# Patient Record
Sex: MALE | Race: WHITE | NOT HISPANIC OR LATINO | Employment: OTHER | URBAN - METROPOLITAN AREA
[De-identification: names, ages, dates, MRNs, and addresses within clinical notes are randomized per-mention and may not be internally consistent; named-entity substitution may affect disease eponyms.]

---

## 2017-01-19 ENCOUNTER — OFFICE VISIT (OUTPATIENT)
Dept: FAMILY MEDICINE CLINIC | Facility: CLINIC | Age: 47
End: 2017-01-19

## 2017-01-19 VITALS
OXYGEN SATURATION: 97 % | WEIGHT: 315 LBS | TEMPERATURE: 98.3 F | DIASTOLIC BLOOD PRESSURE: 70 MMHG | HEART RATE: 67 BPM | BODY MASS INDEX: 47.62 KG/M2 | SYSTOLIC BLOOD PRESSURE: 138 MMHG

## 2017-01-19 DIAGNOSIS — M1A.09X0 IDIOPATHIC CHRONIC GOUT OF MULTIPLE SITES WITHOUT TOPHUS: ICD-10-CM

## 2017-01-19 DIAGNOSIS — L23.5 ALLERGIC DERMATITIS DUE TO OTHER CHEMICAL PRODUCT: ICD-10-CM

## 2017-01-19 DIAGNOSIS — M54.9 CVA TENDERNESS: ICD-10-CM

## 2017-01-19 DIAGNOSIS — E11.42 TYPE 2 DIABETES MELLITUS WITH DIABETIC POLYNEUROPATHY, WITHOUT LONG-TERM CURRENT USE OF INSULIN (HCC): Primary | ICD-10-CM

## 2017-01-19 DIAGNOSIS — R79.89 ABNORMAL LIVER FUNCTION TESTS: ICD-10-CM

## 2017-01-19 DIAGNOSIS — Z79.899 DRUG THERAPY: ICD-10-CM

## 2017-01-19 DIAGNOSIS — I10 BENIGN ESSENTIAL HYPERTENSION: ICD-10-CM

## 2017-01-19 DIAGNOSIS — E78.1 HYPERTRIGLYCERIDEMIA: ICD-10-CM

## 2017-01-19 DIAGNOSIS — I89.0 LYMPHEDEMA: ICD-10-CM

## 2017-01-19 DIAGNOSIS — R53.83 OTHER FATIGUE: ICD-10-CM

## 2017-01-19 LAB
ALBUMIN SERPL-MCNC: 4.4 G/DL (ref 3.5–5.2)
ALBUMIN/GLOB SERPL: 1.6 G/DL
ALP SERPL-CCNC: 80 U/L (ref 39–117)
ALT SERPL-CCNC: 95 U/L (ref 1–41)
AST SERPL-CCNC: 89 U/L (ref 1–40)
BILIRUB BLD-MCNC: NEGATIVE MG/DL
BILIRUB SERPL-MCNC: 0.5 MG/DL (ref 0.1–1.2)
BUN SERPL-MCNC: 21 MG/DL (ref 6–20)
BUN/CREAT SERPL: 19.8 (ref 7–25)
CALCIUM SERPL-MCNC: 9.5 MG/DL (ref 8.6–10.5)
CHLORIDE SERPL-SCNC: 95 MMOL/L (ref 98–107)
CHOLEST SERPL-MCNC: 159 MG/DL (ref 0–200)
CLARITY, POC: CLEAR
CO2 SERPL-SCNC: 26.7 MMOL/L (ref 22–29)
COLOR UR: YELLOW
CREAT SERPL-MCNC: 1.06 MG/DL (ref 0.76–1.27)
GLOBULIN SER CALC-MCNC: 2.8 GM/DL
GLUCOSE SERPL-MCNC: 131 MG/DL (ref 65–99)
GLUCOSE UR STRIP-MCNC: ABNORMAL MG/DL
HBA1C MFR BLD: 6.75 % (ref 4.8–5.6)
HDLC SERPL-MCNC: 29 MG/DL (ref 40–60)
KETONES UR QL: NEGATIVE
LDLC SERPL CALC-MCNC: 68 MG/DL (ref 0–100)
LEUKOCYTE EST, POC: NEGATIVE
NITRITE UR-MCNC: NEGATIVE MG/ML
PH UR: 5.5 [PH] (ref 5–8)
POTASSIUM SERPL-SCNC: 4 MMOL/L (ref 3.5–5.2)
PROT SERPL-MCNC: 7.2 G/DL (ref 6–8.5)
PROT UR STRIP-MCNC: NEGATIVE MG/DL
RBC # UR STRIP: NEGATIVE /UL
SODIUM SERPL-SCNC: 136 MMOL/L (ref 136–145)
SP GR UR: 1.02 (ref 1–1.03)
TRIGL SERPL-MCNC: 312 MG/DL (ref 0–150)
URATE SERPL-MCNC: 5.8 MG/DL (ref 3.4–7)
UROBILINOGEN UR QL: NORMAL
VLDLC SERPL CALC-MCNC: 62.4 MG/DL (ref 5–40)

## 2017-01-19 PROCEDURE — 99214 OFFICE O/P EST MOD 30 MIN: CPT | Performed by: FAMILY MEDICINE

## 2017-01-19 PROCEDURE — 81003 URINALYSIS AUTO W/O SCOPE: CPT | Performed by: FAMILY MEDICINE

## 2017-01-19 RX ORDER — TRIAMCINOLONE ACETONIDE 5 MG/G
OINTMENT TOPICAL 2 TIMES DAILY
Qty: 60 G | Refills: 2 | Status: SHIPPED | OUTPATIENT
Start: 2017-01-19 | End: 2017-07-20

## 2017-01-19 RX ORDER — SPIRONOLACTONE 25 MG/1
25 TABLET ORAL DAILY
Qty: 30 TABLET | Refills: 11 | Status: SHIPPED | OUTPATIENT
Start: 2017-01-19 | End: 2020-05-13

## 2017-01-19 RX ORDER — POTASSIUM CHLORIDE 1500 MG/1
20 TABLET, FILM COATED, EXTENDED RELEASE ORAL 2 TIMES DAILY
Qty: 60 TABLET | Refills: 5 | Status: SHIPPED | OUTPATIENT
Start: 2017-01-19 | End: 2018-01-23

## 2017-01-19 NOTE — PROGRESS NOTES
Subjective   Marcial Desai is a 46 y.o. male. Presents today for   Chief Complaint   Patient presents with   • Diabetes     diabetic foot exam legs very swollen.  Insurance would not pay for invokana and potassium.       Diabetes   He presents for his follow-up diabetic visit. He has type 2 diabetes mellitus. His disease course has been stable. There are no hypoglycemic associated symptoms. Pertinent negatives for hypoglycemia include no dizziness, headaches, speech difficulty or tremors. Associated symptoms include fatigue and foot paresthesias. Pertinent negatives for diabetes include no blurred vision, no chest pain, no foot ulcerations, no polydipsia, no polyuria and no weakness. Symptoms are stable. Risk factors for coronary artery disease include diabetes mellitus, dyslipidemia, hypertension, male sex and obesity. Current diabetic treatment includes oral agent (dual therapy). He is compliant with treatment all of the time (invokana not covered, needs alternative.). His weight is stable (Has lost 6 lbs since last ov.). He is following a diabetic diet. An ACE inhibitor/angiotensin II receptor blocker is being taken. Eye exam is current.   Hypertension   This is a chronic problem. The current episode started more than 1 year ago. The problem is unchanged. The problem is controlled. Associated symptoms include peripheral edema. Pertinent negatives include no blurred vision, chest pain, headaches, orthopnea, palpitations, PND or shortness of breath. Agents associated with hypertension include NSAIDs. Risk factors for coronary artery disease include diabetes mellitus, dyslipidemia, male gender and obesity. Past treatments include ACE inhibitors and diuretics. The current treatment provides moderate improvement. There are no compliance problems.    Hyperlipidemia   This is a chronic problem. The current episode started more than 1 year ago. The problem is uncontrolled (elevated triglycerides). Recent lipid tests were  reviewed and are high. Exacerbating diseases include diabetes. There are no known factors aggravating his hyperlipidemia. Pertinent negatives include no chest pain, focal sensory loss, focal weakness, leg pain, myalgias or shortness of breath. He is currently on no antihyperlipidemic treatment. Improvement on treatment: Due for recheck.   Rash   This is a recurrent problem. The current episode started 1 to 4 weeks ago. The problem has been waxing and waning since onset. The affected locations include the left hand and right hand. The rash is characterized by peeling, scaling and burning. Associated with: contact chemicals at work. Associated symptoms include fatigue. Pertinent negatives include no fever, shortness of breath or vomiting. Past treatments include topical steroids. The treatment provided no relief.   Back Pain   This is a new problem. The current episode started in the past 7 days. The problem occurs intermittently. The pain is present in the thoracic spine. The quality of the pain is described as cramping. The pain does not radiate. The pain is mild. Pertinent negatives include no abdominal pain, bladder incontinence, chest pain, dysuria, fever, headaches, leg pain, numbness or weakness. (Patient has mid-back pain, worried kidneys and would like checked for UTI.  Is on SGLT 2 inhibitor.  No dysuria, hematuria or burning with urination.) He has tried nothing for the symptoms. The treatment provided no relief.   Hx of gout, no flares.  Is on allopurinol and doing well.  Is due for recheck uric acid.  Hx of elevated LFTS, due for recheck.  Patient reports fatigue, he relates that wife says he stops breathing in his sleep.    Review of Systems   Constitutional: Positive for fatigue. Negative for fever. Unexpected weight change: Weight gain or loss.   Eyes: Negative for blurred vision.   Respiratory: Negative for shortness of breath and wheezing.    Cardiovascular: Negative for chest pain, palpitations,  orthopnea, leg swelling and PND.   Gastrointestinal: Negative for abdominal pain, nausea and vomiting.   Endocrine: Negative for polydipsia and polyuria.   Genitourinary: Negative for bladder incontinence and dysuria.   Musculoskeletal: Positive for back pain. Negative for myalgias.   Skin: Positive for rash.   Neurological: Negative for dizziness, tremors, focal weakness, syncope, facial asymmetry, speech difficulty, weakness, light-headedness, numbness and headaches.       The following portions of the patient's history were reviewed and updated as appropriate: allergies, current medications, past medical history and problem list.    Patient Active Problem List   Diagnosis   • Ankle pain   • Atopic dermatitis   • Benign essential hypertension   • Diabetes mellitus   • Diabetic peripheral neuropathy   • Abnormal liver function tests   • Gout   • Hypertriglyceridemia   • Cramps of lower extremity   • Edema of lower extremity   • Lymphedema   • Osteoarthritis       No Known Allergies    Current Outpatient Prescriptions on File Prior to Visit   Medication Sig Dispense Refill   • allopurinol (ZYLOPRIM) 300 MG tablet TAKE ONE TABLET BY MOUTH TWICE A  tablet 1   • atenolol (TENORMIN) 50 MG tablet TAKE ONE TABLET BY MOUTH TWICE A  tablet 1   • furosemide (LASIX) 40 MG tablet TAKE ONE TABLET BY MOUTH DAILY 90 tablet 2   • gabapentin (NEURONTIN) 300 MG capsule TAKE ONE CAPSULE BY MOUTH THREE TIMES A DAY 90 capsule 4   • hydrocortisone 2.5 % cream Apply  topically 2 (two) times a day. Wipe bottom twice daily 28 g 0   • ibuprofen (ADVIL,MOTRIN) 800 MG tablet Take one tablet by mouth three times daily as needed 90 tablet 3   • indomethacin (INDOCIN) 50 MG capsule Take  by mouth.     • lisinopril (PRINIVIL,ZESTRIL) 10 MG tablet TAKE ONE TABLET BY MOUTH DAILY FOR BLOOD PRESSURE 90 tablet 3   • magnesium oxide (MAGOX) 400 (241.3 MG) MG tablet tablet TAKE ONE TABLET BY MOUTH DAILY 30 tablet 2   • metFORMIN  (GLUCOPHAGE) 1000 MG tablet Take 1 tablet by mouth Daily With Breakfast. 30 tablet 11   • triamcinolone (KENALOG) 0.1 % cream Apply  topically 2 (two) times a day.     • [DISCONTINUED] Canagliflozin (INVOKANA) 300 MG tablet Take 300 mg by mouth daily. 30 tablet 11   • [DISCONTINUED] KLOR-CON 10 MEQ CR tablet TAKE THREE TABLETS BY MOUTH TWICE A  tablet 1   • [DISCONTINUED] KLOR-CON 10 MEQ CR tablet TAKE THREE TABLETS BY MOUTH TWICE A  tablet 1   • [DISCONTINUED] potassium chloride (K-DUR,KLOR-CON) 10 MEQ CR tablet Take 3 tablets by mouth 2 (two) times a day.       No current facility-administered medications on file prior to visit.        Objective   Vitals:    01/19/17 0840   BP: 138/70   Pulse: 67   Temp: 98.3 °F (36.8 °C)   SpO2: 97%   Weight: (!) 381 lb (173 kg)       Physical Exam   Constitutional: He appears well-developed and well-nourished.   HENT:   Head: Normocephalic and atraumatic.   Neck: Neck supple. No JVD present. No thyromegaly present.   Cardiovascular: Normal rate, regular rhythm and normal heart sounds.  Exam reveals no gallop and no friction rub.    No murmur heard.  Pulmonary/Chest: Effort normal and breath sounds normal. No respiratory distress. He has no wheezes. He has no rales.   Abdominal: Soft. Bowel sounds are normal. He exhibits no distension. There is no tenderness. There is no rebound and no guarding.   Musculoskeletal: He exhibits edema (lymphedema).    Marcial had a diabetic foot exam performed (see scanned report) today.   During the foot exam he had a monofilament test performed.  Neurological: He is alert.   Skin: Skin is warm and dry.   Finger tips dry, cracking c/w contact dermatitis.   Psychiatric: He has a normal mood and affect. His behavior is normal.   Nursing note and vitals reviewed.    Color Yellow, Straw, Dark Yellow, Sindi Yellow   Clarity, UA Clear Clear   Glucose, UA Negative, 1000 mg/dL (3+) mg/dL 1000 mg/dL (A)   Bilirubin Negative Negative   Ketones, UA  Negative Negative   Specific Gravity  1.005 - 1.030 1.020   Blood, UA Negative Negative   pH, Urine 5.0 - 8.0 5.5   Protein, POC Negative mg/dL Negative   Urobilinogen, UA Normal Normal   Leukocytes Negative Negative   Nitrite, UA Negative Negative   Resulting Agency  [facility]      Specimen Collected: 01/19/17 10:31 AM   Last Resulted: 01/19/17 10:31 AM            Assessment/Plan   Marcial was seen today for diabetes.    Diagnoses and all orders for this visit:    Type 2 diabetes mellitus with diabetic polyneuropathy, without long-term current use of insulin  -     Comprehensive Metabolic Panel  -     Hemoglobin A1c  -     Lipid Panel  -     Dapagliflozin Propanediol (FARXIGA) 10 MG tablet; Take 10 mg by mouth Daily.    Hypertriglyceridemia  -     Lipid Panel    Benign essential hypertension  -     Comprehensive Metabolic Panel    Abnormal liver function tests  -     Comprehensive Metabolic Panel    Lymphedema  -     potassium chloride ER (K-TAB) 20 MEQ tablet controlled-release ER tablet; Take 1 tablet by mouth 2 (Two) Times a Day.  -     spironolactone (ALDACTONE) 25 MG tablet; Take 1 tablet by mouth Daily.    Idiopathic chronic gout of multiple sites without tophus  -     Uric Acid    Drug therapy  -     potassium chloride ER (K-TAB) 20 MEQ tablet controlled-release ER tablet; Take 1 tablet by mouth 2 (Two) Times a Day.    Allergic dermatitis due to other chemical product  -     triamcinolone (KENALOG) 0.5 % ointment; Apply  topically 2 (Two) Times a Day.    Other fatigue  -     Ambulatory Referral to Sleep Medicine    CVA tenderness  -     POC Urinalysis Dipstick, Automated  -     Urine Culture    -wear gloves when handling chemicals;  Will give topical steroid to apply  -u/a negative, pain in midback likely musculoskeletal  -dm2 - gave farxiga Rx card, to call if told any cost as should be $0 with card.  -hypertension - controlled, continue medications  -recheck TGs, work on diet and  continue DM2 control  -patient at risk for sleep apnea, refer to sleep specialist.         -Follow up: 6 months       Current Outpatient Prescriptions:   •  allopurinol (ZYLOPRIM) 300 MG tablet, TAKE ONE TABLET BY MOUTH TWICE A DAY, Disp: 180 tablet, Rfl: 1  •  atenolol (TENORMIN) 50 MG tablet, TAKE ONE TABLET BY MOUTH TWICE A DAY, Disp: 180 tablet, Rfl: 1  •  furosemide (LASIX) 40 MG tablet, TAKE ONE TABLET BY MOUTH DAILY, Disp: 90 tablet, Rfl: 2  •  gabapentin (NEURONTIN) 300 MG capsule, TAKE ONE CAPSULE BY MOUTH THREE TIMES A DAY, Disp: 90 capsule, Rfl: 4  •  hydrocortisone 2.5 % cream, Apply  topically 2 (two) times a day. Wipe bottom twice daily, Disp: 28 g, Rfl: 0  •  ibuprofen (ADVIL,MOTRIN) 800 MG tablet, Take one tablet by mouth three times daily as needed, Disp: 90 tablet, Rfl: 3  •  indomethacin (INDOCIN) 50 MG capsule, Take  by mouth., Disp: , Rfl:   •  lisinopril (PRINIVIL,ZESTRIL) 10 MG tablet, TAKE ONE TABLET BY MOUTH DAILY FOR BLOOD PRESSURE, Disp: 90 tablet, Rfl: 3  •  magnesium oxide (MAGOX) 400 (241.3 MG) MG tablet tablet, TAKE ONE TABLET BY MOUTH DAILY, Disp: 30 tablet, Rfl: 2  •  metFORMIN (GLUCOPHAGE) 1000 MG tablet, Take 1 tablet by mouth Daily With Breakfast., Disp: 30 tablet, Rfl: 11  •  triamcinolone (KENALOG) 0.1 % cream, Apply  topically 2 (two) times a day., Disp: , Rfl:

## 2017-01-19 NOTE — MR AVS SNAPSHOT
Marcial Desai   1/19/2017 8:15 AM   Office Visit    Provider:  Tesfaye Swartz DO   Department:  [facility]   Dept Phone:  [phone]                Your Full Care Plan              Today's Medication Changes          These changes are accurate as of: 1/19/17  9:39 AM.  If you have any questions, ask your nurse or doctor.               New Medication(s)Ordered:     Dapagliflozin Propanediol 10 MG tablet   Commonly known as:  FARXIGA   Take 10 mg by mouth Daily.   Started by:  Tesfaye Swartz DO       potassium chloride ER 20 MEQ tablet controlled-release ER tablet   Commonly known as:  K-TAB   Take 1 tablet by mouth 2 (Two) Times a Day.   Started by:  Tesfaye Swartz DO       spironolactone 25 MG tablet   Commonly known as:  ALDACTONE   Take 1 tablet by mouth Daily.   Started by:  Tesfaye Swartz DO         Medication(s)that have changed:     * triamcinolone 0.1 % cream   Commonly known as:  KENALOG   Apply  topically 2 (two) times a day.   What changed:  Another medication with the same name was added. Make sure you understand how and when to take each.   Changed by:  Sapna Knight MA       * triamcinolone 0.5 % ointment   Commonly known as:  KENALOG   Apply  topically 2 (Two) Times a Day.   What changed:  You were already taking a medication with the same name, and this prescription was added. Make sure you understand how and when to take each.   Changed by:  Tesfaye Swartz DO       * Notice:  This list has 2 medication(s) that are the same as other medications prescribed for you. Read the directions carefully, and ask your doctor or other care provider to review them with you.      Stop taking medication(s)listed here:     Canagliflozin 300 MG tablet   Commonly known as:  INVOKANA   Stopped by:  Tesfaye Swartz DO           KLOR-CON 10 MEQ CR tablet   Generic drug:  potassium chloride   Stopped by:  Tesfaye Swartz DO           potassium  chloride 10 MEQ CR tablet   Commonly known as:  K-FUNMI BARRONOR-ROCKY   Stopped by:  Tesfaye Swartz, DO                Where to Get Your Medications      These medications were sent to [facility] [address] [place] [address] - [address] [place] [address] AT [facility] [address] - [phone] PH - [phone] FX  [address] [facility] [address]     Phone:  [phone]     potassium chloride ER 20 MEQ tablet controlled-release ER tablet    spironolactone 25 MG tablet    triamcinolone 0.5 % ointment         You can get these medications from any pharmacy     Bring a paper prescription for each of these medications     Dapagliflozin Propanediol 10 MG tablet                  Your Updated Medication List          This list is accurate as of: 1/19/17  9:39 AM.  Always use your most recent med list.                allopurinol 300 MG tablet   Commonly known as:  ZYLOPRIM   TAKE ONE TABLET BY MOUTH TWICE A DAY       atenolol 50 MG tablet   Commonly known as:  TENORMIN   TAKE ONE TABLET BY MOUTH TWICE A DAY       Dapagliflozin Propanediol 10 MG tablet   Commonly known as:  FARXIGA   Take 10 mg by mouth Daily.       furosemide 40 MG tablet   Commonly known as:  LASIX   TAKE ONE TABLET BY MOUTH DAILY       gabapentin 300 MG capsule   Commonly known as:  NEURONTIN   TAKE ONE CAPSULE BY MOUTH THREE TIMES A DAY       hydrocortisone 2.5 % cream   Apply  topically 2 (two) times a day. Wipe bottom twice daily       ibuprofen 800 MG tablet   Commonly known as:  ADVIL,MOTRIN   Take one tablet by mouth three times daily as needed       indomethacin 50 MG capsule   Commonly known as:  INDOCIN       lisinopril 10 MG tablet   Commonly known as:  PRINIVIL,ZESTRIL   TAKE ONE TABLET BY MOUTH DAILY FOR BLOOD PRESSURE       magnesium oxide 400 (241.3 MG) MG tablet tablet   Commonly known as:  MAGOX   TAKE ONE TABLET BY MOUTH DAILY       metFORMIN 1000 MG tablet   Commonly known as:  GLUCOPHAGE   Take 1 tablet by mouth Daily With Breakfast.        potassium chloride ER 20 MEQ tablet controlled-release ER tablet   Commonly known as:  K-TAB   Take 1 tablet by mouth 2 (Two) Times a Day.       spironolactone 25 MG tablet   Commonly known as:  ALDACTONE   Take 1 tablet by mouth Daily.       * triamcinolone 0.1 % cream   Commonly known as:  KENALOG       * triamcinolone 0.5 % ointment   Commonly known as:  KENALOG   Apply  topically 2 (Two) Times a Day.       * Notice:  This list has 2 medication(s) that are the same as other medications prescribed for you. Read the directions carefully, and ask your doctor or other care provider to review them with you.            We Performed the Following     Ambulatory Referral to Sleep Medicine     Comprehensive Metabolic Panel     Hemoglobin A1c     Lipid Panel     Uric Acid       You Were Diagnosed With        Codes Comments    Type 2 diabetes mellitus with diabetic polyneuropathy, without long-term current use of insulin    -  Primary ICD-10-CM: E11.42  ICD-9-CM: 250.60, 357.2     Hypertriglyceridemia     ICD-10-CM: E78.1  ICD-9-CM: 272.1     Benign essential hypertension     ICD-10-CM: I10  ICD-9-CM: 401.1     Abnormal liver function tests     ICD-10-CM: R79.89  ICD-9-CM: 790.6     Lymphedema     ICD-10-CM: I89.0  ICD-9-CM: 457.1     Idiopathic chronic gout of multiple sites without tophus     ICD-10-CM: M1A.09X0  ICD-9-CM: 274.02     Drug therapy     ICD-10-CM: Z79.899  ICD-9-CM: V58.69     Allergic dermatitis due to other chemical product     ICD-10-CM: L23.5     Other fatigue     ICD-10-CM: R53.83  ICD-9-CM: 780.79       Instructions     None    Patient Instructions History      [employer] Signup     [employer] allows you to send messages to your doctor, view your test results, renew your prescriptions, schedule appointments, and more. To sign up, go to [employer] and click on the Sign Up Now link in the New User? box. Enter your [employer] Activation Code exactly as it appears below along with the  last four digits of your Social Security Number and your Date of Birth () to complete the sign-up process. If you do not sign up before the expiration date, you must request a new code.    [facility] Activation Code: [email or web address]  Expires: 2017  9:39 AM    If you have questions, you can email [email or web address] or call [phone] to talk to our [facility] staff. Remember, [employer] is NOT to be used for urgent needs. For medical emergencies, dial 911.               Other Info from Your Visit           Your Appointments     2017  8:30 AM EDT   Follow Up with Tesfaye Swartz DO   [facility] (--)    [address] [place] [address] [id number] [phone]           Arrive 15 minutes prior to appointment.              Allergies     No Known Allergies      Reason for Visit     Diabetes diabetic foot exam legs very swollen.  Insurance would not pay for invokana and potassium.      Vital Signs     Blood Pressure Pulse Temperature Weight Oxygen Saturation Body Mass Index    138/70 67 98.3 °F (36.8 °C) 381 lb (173 kg) 97% 47.62 kg/m2    Smoking Status                   Former Smoker           Problems and Diagnoses Noted     Abnormal liver function test    Benign essential hypertension    Hypertriglyceridemia    Chronic gout    Lymphedema    Type 2 diabetes mellitus with diabetic polyneuropathy, without long-term current use of insulin    Pharmacologic therapy        Contact dermatitis        Malaise and fatigue

## 2017-01-21 LAB
BACTERIA UR CULT: NO GROWTH
BACTERIA UR CULT: NORMAL

## 2017-01-23 ENCOUNTER — TELEPHONE (OUTPATIENT)
Dept: FAMILY MEDICINE CLINIC | Facility: CLINIC | Age: 47
End: 2017-01-23

## 2017-01-23 NOTE — PROGRESS NOTES
Diabetes is adequately controlled.  Cholesterol is adequately controlled.  Urine culture is negative  Elevated liver enzymes slight improvement and stable.  Rest of labs normal or stable.

## 2017-01-23 NOTE — TELEPHONE ENCOUNTER
----- Message from Tesfaye Swartz DO sent at 1/22/2017  7:08 PM EST -----  Diabetes is adequately controlled.  Cholesterol is adequately controlled.  Urine culture is negative  Elevated liver enzymes slight improvement and stable.  Rest of labs normal or stable.

## 2017-02-13 RX ORDER — LISINOPRIL 10 MG/1
TABLET ORAL
Qty: 90 TABLET | Refills: 2 | Status: SHIPPED | OUTPATIENT
Start: 2017-02-13 | End: 2017-10-18 | Stop reason: SDUPTHER

## 2017-03-13 RX ORDER — IBUPROFEN 800 MG/1
TABLET ORAL
Qty: 90 TABLET | Refills: 2 | Status: SHIPPED | OUTPATIENT
Start: 2017-03-13 | End: 2017-07-12 | Stop reason: SDUPTHER

## 2017-03-16 RX ORDER — FUROSEMIDE 40 MG/1
TABLET ORAL
Qty: 90 TABLET | Refills: 1 | Status: SHIPPED | OUTPATIENT
Start: 2017-03-16 | End: 2017-08-23 | Stop reason: SDUPTHER

## 2017-04-19 ENCOUNTER — APPOINTMENT (OUTPATIENT)
Dept: SLEEP MEDICINE | Facility: HOSPITAL | Age: 47
End: 2017-04-19

## 2017-05-22 ENCOUNTER — OFFICE VISIT (OUTPATIENT)
Dept: FAMILY MEDICINE CLINIC | Facility: CLINIC | Age: 47
End: 2017-05-22

## 2017-05-22 VITALS
TEMPERATURE: 98.3 F | WEIGHT: 315 LBS | SYSTOLIC BLOOD PRESSURE: 126 MMHG | HEART RATE: 79 BPM | HEIGHT: 76 IN | DIASTOLIC BLOOD PRESSURE: 78 MMHG | OXYGEN SATURATION: 96 % | BODY MASS INDEX: 38.36 KG/M2

## 2017-05-22 DIAGNOSIS — J00 ACUTE NASOPHARYNGITIS: Primary | ICD-10-CM

## 2017-05-22 PROCEDURE — 99213 OFFICE O/P EST LOW 20 MIN: CPT | Performed by: NURSE PRACTITIONER

## 2017-05-22 RX ORDER — BENZONATATE 100 MG/1
100 CAPSULE ORAL 3 TIMES DAILY PRN
Qty: 30 CAPSULE | Refills: 0 | Status: SHIPPED | OUTPATIENT
Start: 2017-05-22 | End: 2017-07-20

## 2017-05-22 RX ORDER — FLUTICASONE PROPIONATE 50 MCG
2 SPRAY, SUSPENSION (ML) NASAL DAILY
Qty: 1 EACH | Refills: 0 | Status: SHIPPED | OUTPATIENT
Start: 2017-05-22 | End: 2017-06-17 | Stop reason: SDUPTHER

## 2017-05-22 RX ORDER — CETIRIZINE HYDROCHLORIDE 10 MG/1
10 TABLET ORAL DAILY
Qty: 30 TABLET | Refills: 1 | Status: SHIPPED | OUTPATIENT
Start: 2017-05-22 | End: 2017-07-12 | Stop reason: SDUPTHER

## 2017-06-13 RX ORDER — ATENOLOL 50 MG/1
TABLET ORAL
Qty: 60 TABLET | Refills: 3 | Status: SHIPPED | OUTPATIENT
Start: 2017-06-13 | End: 2017-09-21 | Stop reason: SDUPTHER

## 2017-06-15 ENCOUNTER — TELEPHONE (OUTPATIENT)
Dept: FAMILY MEDICINE CLINIC | Facility: CLINIC | Age: 47
End: 2017-06-15

## 2017-06-16 NOTE — TELEPHONE ENCOUNTER
Pt called disability office. He has appt on 6/22. They told him he needs to bring a letter from doctor stating his health problems and your opinion on why he should not be working.

## 2017-06-16 NOTE — TELEPHONE ENCOUNTER
Letters are not that helpful, need to get functional capacity exam (highly recommend) as can gauge work capabilities.  FOr disability, needs to go to Social security office to file for disability and make sure sign release to have all notes forwarded to them.  He may want to seek the advice of a .    JAVYJ

## 2017-06-17 DIAGNOSIS — J00 ACUTE NASOPHARYNGITIS: ICD-10-CM

## 2017-06-19 ENCOUNTER — TELEPHONE (OUTPATIENT)
Dept: FAMILY MEDICINE CLINIC | Facility: CLINIC | Age: 47
End: 2017-06-19

## 2017-06-19 ENCOUNTER — DOCUMENTATION (OUTPATIENT)
Dept: FAMILY MEDICINE CLINIC | Facility: CLINIC | Age: 47
End: 2017-06-19

## 2017-06-19 RX ORDER — FLUTICASONE PROPIONATE 50 MCG
SPRAY, SUSPENSION (ML) NASAL
Qty: 16 G | Refills: 0 | Status: SHIPPED | OUTPATIENT
Start: 2017-06-19 | End: 2021-09-14 | Stop reason: SDUPTHER

## 2017-06-19 NOTE — PROGRESS NOTES
Subjective   Marcial Desai is a 47 y.o. male.     History of Present Illness     {Common H&P Review Areas:05875}    Review of Systems    Objective   Physical Exam    Assessment/Plan   {Assess/PlanSmartLinks:58822}

## 2017-07-12 DIAGNOSIS — J00 ACUTE NASOPHARYNGITIS: ICD-10-CM

## 2017-07-12 RX ORDER — CETIRIZINE HYDROCHLORIDE 10 MG/1
TABLET ORAL
Qty: 30 TABLET | Refills: 0 | Status: SHIPPED | OUTPATIENT
Start: 2017-07-12 | End: 2017-07-23 | Stop reason: SDUPTHER

## 2017-07-12 RX ORDER — GABAPENTIN 300 MG/1
CAPSULE ORAL
Qty: 90 CAPSULE | Refills: 5 | OUTPATIENT
Start: 2017-07-12 | End: 2018-01-12 | Stop reason: DRUGHIGH

## 2017-07-12 RX ORDER — IBUPROFEN 800 MG/1
TABLET ORAL
Qty: 90 TABLET | Refills: 1 | Status: SHIPPED | OUTPATIENT
Start: 2017-07-12 | End: 2017-08-23 | Stop reason: SDUPTHER

## 2017-07-20 ENCOUNTER — OFFICE VISIT (OUTPATIENT)
Dept: FAMILY MEDICINE CLINIC | Facility: CLINIC | Age: 47
End: 2017-07-20

## 2017-07-20 VITALS
HEART RATE: 65 BPM | DIASTOLIC BLOOD PRESSURE: 70 MMHG | BODY MASS INDEX: 45.65 KG/M2 | OXYGEN SATURATION: 97 % | WEIGHT: 315 LBS | SYSTOLIC BLOOD PRESSURE: 118 MMHG | TEMPERATURE: 98.5 F

## 2017-07-20 DIAGNOSIS — R79.89 ABNORMAL LIVER FUNCTION TESTS: ICD-10-CM

## 2017-07-20 DIAGNOSIS — E66.01 MORBID OBESITY DUE TO EXCESS CALORIES (HCC): ICD-10-CM

## 2017-07-20 DIAGNOSIS — E11.42 TYPE 2 DIABETES MELLITUS WITH DIABETIC POLYNEUROPATHY, WITHOUT LONG-TERM CURRENT USE OF INSULIN (HCC): Primary | ICD-10-CM

## 2017-07-20 DIAGNOSIS — E78.1 HYPERTRIGLYCERIDEMIA: ICD-10-CM

## 2017-07-20 DIAGNOSIS — I89.0 LYMPHEDEMA: ICD-10-CM

## 2017-07-20 DIAGNOSIS — I10 BENIGN ESSENTIAL HYPERTENSION: ICD-10-CM

## 2017-07-20 DIAGNOSIS — M1A.09X0 IDIOPATHIC CHRONIC GOUT OF MULTIPLE SITES WITHOUT TOPHUS: ICD-10-CM

## 2017-07-20 LAB
ALBUMIN SERPL-MCNC: 4.4 G/DL (ref 3.5–5.2)
ALBUMIN/GLOB SERPL: 1.4 G/DL
ALP SERPL-CCNC: 84 U/L (ref 39–117)
ALT SERPL-CCNC: 104 U/L (ref 1–41)
AST SERPL-CCNC: 109 U/L (ref 1–40)
BILIRUB SERPL-MCNC: 0.7 MG/DL (ref 0.1–1.2)
BUN SERPL-MCNC: 16 MG/DL (ref 6–20)
BUN/CREAT SERPL: 15.7 (ref 7–25)
CALCIUM SERPL-MCNC: 10.1 MG/DL (ref 8.6–10.5)
CHLORIDE SERPL-SCNC: 96 MMOL/L (ref 98–107)
CHOLEST SERPL-MCNC: 173 MG/DL (ref 0–200)
CO2 SERPL-SCNC: 27.1 MMOL/L (ref 22–29)
CREAT SERPL-MCNC: 1.02 MG/DL (ref 0.76–1.27)
GLOBULIN SER CALC-MCNC: 3.1 GM/DL
GLUCOSE SERPL-MCNC: 156 MG/DL (ref 65–99)
HBA1C MFR BLD: 7.6 % (ref 4.8–5.6)
HDLC SERPL-MCNC: 33 MG/DL (ref 40–60)
LDLC SERPL CALC-MCNC: 71 MG/DL (ref 0–100)
POC CREATININE URINE: 100
POC MICROALBUMIN URINE: 10
POTASSIUM SERPL-SCNC: 4.2 MMOL/L (ref 3.5–5.2)
PROT SERPL-MCNC: 7.5 G/DL (ref 6–8.5)
SODIUM SERPL-SCNC: 139 MMOL/L (ref 136–145)
TRIGL SERPL-MCNC: 344 MG/DL (ref 0–150)
URATE SERPL-MCNC: 4.8 MG/DL (ref 3.4–7)
VLDLC SERPL CALC-MCNC: 68.8 MG/DL (ref 5–40)

## 2017-07-20 PROCEDURE — 99214 OFFICE O/P EST MOD 30 MIN: CPT | Performed by: FAMILY MEDICINE

## 2017-07-20 PROCEDURE — 82044 UR ALBUMIN SEMIQUANTITATIVE: CPT | Performed by: FAMILY MEDICINE

## 2017-07-20 RX ORDER — ATORVASTATIN CALCIUM 20 MG/1
20 TABLET, FILM COATED ORAL NIGHTLY
Qty: 30 TABLET | Refills: 5 | Status: SHIPPED | OUTPATIENT
Start: 2017-07-20 | End: 2018-01-28 | Stop reason: SDUPTHER

## 2017-07-20 NOTE — PROGRESS NOTES
Subjective   Marcial Desai is a 47 y.o. male. Presents today for   Chief Complaint   Patient presents with   • Follow-up     med check and labs needs glyco and microalbumin.   • Hypertension   • Diabetes   • Hyperlipidemia   • Gout   • Edema       Diabetes   He presents for his follow-up diabetic visit. He has type 2 diabetes mellitus. His disease course has been stable. Pertinent negatives for hypoglycemia include no dizziness, headaches, speech difficulty or tremors. Associated symptoms include foot paresthesias. Pertinent negatives for diabetes include no blurred vision, no chest pain, no foot ulcerations and no weakness. Symptoms are stable. Pertinent negatives for diabetic complications include no CVA. Risk factors for coronary artery disease include diabetes mellitus, dyslipidemia, hypertension, male sex and obesity. Current diabetic treatment includes diet and oral agent (dual therapy). He is compliant with treatment all of the time. He is following a diabetic diet. An ACE inhibitor/angiotensin II receptor blocker is being taken. Eye exam is current.   Hypertension   This is a chronic problem. The current episode started more than 1 year ago. The problem is unchanged. The problem is controlled. Associated symptoms include peripheral edema. Pertinent negatives include no blurred vision, chest pain, headaches, orthopnea, palpitations, PND or shortness of breath. There are no associated agents to hypertension. Risk factors for coronary artery disease include dyslipidemia, diabetes mellitus, male gender and obesity. Past treatments include ACE inhibitors and beta blockers. The current treatment provides moderate improvement. There are no compliance problems.  There is no history of kidney disease, CAD/MI, CVA or heart failure. There is no history of chronic renal disease.   Hyperlipidemia   This is a chronic problem. The current episode started more than 1 year ago. The problem is uncontrolled (Mostly tgs). Recent  lipid tests were reviewed and are high. He has no history of chronic renal disease. Pertinent negatives include no chest pain, myalgias or shortness of breath. He is currently on no antihyperlipidemic treatment. Improvement on treatment: Due recheck, recommend statin. Risk factors for coronary artery disease include diabetes mellitus, dyslipidemia, hypertension, male sex and obesity.   Leg Swelling   This is a chronic problem. The current episode started more than 1 year ago. The problem occurs constantly. The problem has been unchanged. Pertinent negatives include no abdominal pain, chest pain, headaches, myalgias, nausea, numbness, vomiting or weakness. Associated symptoms comments: Patient was , was unable to climb ladders, get down on knees and work refused to accomodate, so forced to quit.  Applied for SSI awaiting exam up coming.. The symptoms are aggravated by walking and standing. Treatments tried: Reports off legs more helps a little with lymphedema;  Hrad time wtih compression given size;   Is on diuretic as well. The treatment provided mild relief.   Hx of gout, some joint pain, no swelling or redness;  Hurts frequently;  Patient on allopurinol, due for recheck.    Review of Systems   Constitutional: Unexpected weight change: Weight gain or loss.   Eyes: Negative for blurred vision.   Respiratory: Negative for shortness of breath and wheezing.    Cardiovascular: Negative for chest pain, palpitations, orthopnea, leg swelling and PND.   Gastrointestinal: Negative for abdominal pain, nausea and vomiting.   Musculoskeletal: Negative for myalgias.   Neurological: Negative for dizziness, tremors, syncope, facial asymmetry, speech difficulty, weakness, light-headedness, numbness and headaches.       The following portions of the patient's history were reviewed and updated as appropriate: allergies, current medications, past medical history and problem list.    Patient Active Problem List    Diagnosis   • Ankle pain   • Atopic dermatitis   • Benign essential hypertension   • Type 2 diabetes mellitus with diabetic polyneuropathy, without long-term current use of insulin   • Diabetic peripheral neuropathy   • Abnormal liver function tests   • Idiopathic chronic gout of multiple sites without tophus   • Hypertriglyceridemia   • Cramps of lower extremity   • Edema of lower extremity   • Lymphedema   • Osteoarthritis       No Known Allergies    Current Outpatient Prescriptions on File Prior to Visit   Medication Sig Dispense Refill   • allopurinol (ZYLOPRIM) 300 MG tablet TAKE ONE TABLET BY MOUTH TWICE A  tablet 1   • atenolol (TENORMIN) 50 MG tablet TAKE ONE TABLET BY MOUTH TWICE A DAY 60 tablet 3   • cetirizine (zyrTEC) 10 MG tablet TAKE ONE TABLET BY MOUTH DAILY 30 tablet 0   • Dapagliflozin Propanediol (FARXIGA) 10 MG tablet Take 10 mg by mouth Daily. 30 tablet 12   • fluticasone (FLONASE) 50 MCG/ACT nasal spray PLACE 2 SPRAYS IN EACH NOSTRIL DAILY 16 g 0   • furosemide (LASIX) 40 MG tablet TAKE ONE TABLET BY MOUTH DAILY 90 tablet 1   • gabapentin (NEURONTIN) 300 MG capsule TAKE ONE CAPSULE BY MOUTH THREE TIMES A DAY 90 capsule 5   • hydrocortisone 2.5 % cream Apply  topically 2 (two) times a day. Wipe bottom twice daily 28 g 0   • ibuprofen (ADVIL,MOTRIN) 800 MG tablet Take one tablet by mouth three times daily as needed 90 tablet 1   • lisinopril (PRINIVIL,ZESTRIL) 10 MG tablet TAKE ONE TABLET BY MOUTH DAILY FOR BLOOD PRESSURE 90 tablet 2   • magnesium oxide (MAGOX) 400 (241.3 MG) MG tablet tablet TAKE ONE TABLET BY MOUTH DAILY 90 tablet 1   • metFORMIN (GLUCOPHAGE) 1000 MG tablet Take 1 tablet by mouth Daily With Breakfast. 30 tablet 11   • potassium chloride ER (K-TAB) 20 MEQ tablet controlled-release ER tablet Take 1 tablet by mouth 2 (Two) Times a Day. 60 tablet 5   • spironolactone (ALDACTONE) 25 MG tablet Take 1 tablet by mouth Daily. 30 tablet 11   • triamcinolone (KENALOG) 0.1 % cream  Apply  topically 2 (two) times a day.     • [DISCONTINUED] benzonatate (TESSALON PERLES) 100 MG capsule Take 1 capsule by mouth 3 (Three) Times a Day As Needed for Cough. 30 capsule 0   • [DISCONTINUED] indomethacin (INDOCIN) 50 MG capsule Take 50 mg by mouth Daily.     • [DISCONTINUED] triamcinolone (KENALOG) 0.5 % ointment Apply  topically 2 (Two) Times a Day. 60 g 2     No current facility-administered medications on file prior to visit.        Objective   Vitals:    07/20/17 0828   BP: 118/70   Pulse: 65   Temp: 98.5 °F (36.9 °C)   SpO2: 97%   Weight: (!) 375 lb (170 kg)       Physical Exam    POCT microalbumin   Status:  Final result   Visible to patient:  No (Not Released) Dx:  Type 2 diabetes mellitus with diabeti... Order: [id number]          8:46 AM       Microalbumin, Urine 10     Creatinine, Urine 100     [facility]          Specimen Collected: 07/20/17  8:46 AM                 Assessment/Plan   Marcial was seen today for follow-up, hypertension, diabetes, hyperlipidemia, gout and edema.    Diagnoses and all orders for this visit:    Type 2 diabetes mellitus with diabetic polyneuropathy, without long-term current use of insulin  -     POCT microalbumin  -     Comprehensive Metabolic Panel  -     Lipid Panel  -     Hemoglobin A1c  -     Uric Acid  -     atorvastatin (LIPITOR) 20 MG tablet; Take 1 tablet by mouth Every Night.    Benign essential hypertension  -     Comprehensive Metabolic Panel  -     Lipid Panel  -     Hemoglobin A1c  -     Uric Acid    Hypertriglyceridemia  -     Comprehensive Metabolic Panel  -     Lipid Panel  -     Hemoglobin A1c  -     Uric Acid  -     atorvastatin (LIPITOR) 20 MG tablet; Take 1 tablet by mouth Every Night.    Idiopathic chronic gout of multiple sites without tophus  -     Comprehensive Metabolic Panel  -     Lipid Panel  -     Hemoglobin A1c  -     Uric Acid    Abnormal liver function tests  -     Comprehensive Metabolic Panel  -      Lipid Panel  -     Hemoglobin A1c  -     Uric Acid    Lymphedema  -     Comprehensive Metabolic Panel  -     Lipid Panel  -     Hemoglobin A1c  -     Uric Acid    -patient with severe lymphedema, recommend weight loss.  He also has diabetes as well, will try adding trulicity, gave samples to try and will have staff teach use.  -hx of gout, needs uric acid level rechecked  -hx of elevated liver enzymes, due recheck  -hypertension - controlled, continue medications           -Follow up: 3 months       Current Outpatient Prescriptions:   •  allopurinol (ZYLOPRIM) 300 MG tablet, TAKE ONE TABLET BY MOUTH TWICE A DAY, Disp: 180 tablet, Rfl: 1  •  atenolol (TENORMIN) 50 MG tablet, TAKE ONE TABLET BY MOUTH TWICE A DAY, Disp: 60 tablet, Rfl: 3  •  cetirizine (zyrTEC) 10 MG tablet, TAKE ONE TABLET BY MOUTH DAILY, Disp: 30 tablet, Rfl: 0  •  Dapagliflozin Propanediol (FARXIGA) 10 MG tablet, Take 10 mg by mouth Daily., Disp: 30 tablet, Rfl: 12  •  fluticasone (FLONASE) 50 MCG/ACT nasal spray, PLACE 2 SPRAYS IN EACH NOSTRIL DAILY, Disp: 16 g, Rfl: 0  •  furosemide (LASIX) 40 MG tablet, TAKE ONE TABLET BY MOUTH DAILY, Disp: 90 tablet, Rfl: 1  •  gabapentin (NEURONTIN) 300 MG capsule, TAKE ONE CAPSULE BY MOUTH THREE TIMES A DAY, Disp: 90 capsule, Rfl: 5  •  hydrocortisone 2.5 % cream, Apply  topically 2 (two) times a day. Wipe bottom twice daily, Disp: 28 g, Rfl: 0  •  ibuprofen (ADVIL,MOTRIN) 800 MG tablet, Take one tablet by mouth three times daily as needed, Disp: 90 tablet, Rfl: 1  •  lisinopril (PRINIVIL,ZESTRIL) 10 MG tablet, TAKE ONE TABLET BY MOUTH DAILY FOR BLOOD PRESSURE, Disp: 90 tablet, Rfl: 2  •  magnesium oxide (MAGOX) 400 (241.3 MG) MG tablet tablet, TAKE ONE TABLET BY MOUTH DAILY, Disp: 90 tablet, Rfl: 1  •  metFORMIN (GLUCOPHAGE) 1000 MG tablet, Take 1 tablet by mouth Daily With Breakfast., Disp: 30 tablet, Rfl: 11  •  potassium chloride ER (K-TAB) 20 MEQ tablet controlled-release ER tablet, Take 1 tablet by  mouth 2 (Two) Times a Day., Disp: 60 tablet, Rfl: 5  •  spironolactone (ALDACTONE) 25 MG tablet, Take 1 tablet by mouth Daily., Disp: 30 tablet, Rfl: 11  •  triamcinolone (KENALOG) 0.1 % cream, Apply  topically 2 (two) times a day., Disp: , Rfl:

## 2017-07-23 DIAGNOSIS — J00 ACUTE NASOPHARYNGITIS: ICD-10-CM

## 2017-07-23 NOTE — PROGRESS NOTES
Diabetes is near goal, start trulicity as discussed.  Cholesterol is adequately controlled.  Triglycerides are high, work on diabetic diet.  Liver enzymes have risen, to prevent liver failure recommend work on weight loss.  Check US (GB) to check liver Dx elevated liver enzymes, will repeat labs at follow-up.  Rest of labs normal or stable.

## 2017-07-24 DIAGNOSIS — R74.8 ELEVATED LIVER ENZYMES: Primary | ICD-10-CM

## 2017-07-24 RX ORDER — ALLOPURINOL 300 MG/1
TABLET ORAL
Qty: 60 TABLET | Refills: 0 | Status: SHIPPED | OUTPATIENT
Start: 2017-07-24 | End: 2017-08-23 | Stop reason: SDUPTHER

## 2017-07-24 RX ORDER — CETIRIZINE HYDROCHLORIDE 10 MG/1
TABLET ORAL
Qty: 30 TABLET | Refills: 0 | Status: SHIPPED | OUTPATIENT
Start: 2017-07-24 | End: 2017-10-18 | Stop reason: SDUPTHER

## 2017-08-03 ENCOUNTER — HOSPITAL ENCOUNTER (OUTPATIENT)
Dept: ULTRASOUND IMAGING | Facility: HOSPITAL | Age: 47
Discharge: HOME | End: 2017-08-03
Admitting: FAMILY MEDICINE

## 2017-08-03 PROCEDURE — 76705 ECHO EXAM OF ABDOMEN: CPT

## 2017-08-07 DIAGNOSIS — R74.8 ELEVATED LIVER ENZYMES: Primary | ICD-10-CM

## 2017-08-23 RX ORDER — FUROSEMIDE 40 MG/1
TABLET ORAL
Qty: 90 TABLET | Refills: 0 | Status: SHIPPED | OUTPATIENT
Start: 2017-08-23 | End: 2017-11-16 | Stop reason: SDUPTHER

## 2017-08-23 RX ORDER — ALLOPURINOL 300 MG/1
TABLET ORAL
Qty: 60 TABLET | Refills: 0 | Status: SHIPPED | OUTPATIENT
Start: 2017-08-23 | End: 2017-09-21 | Stop reason: SDUPTHER

## 2017-08-23 RX ORDER — IBUPROFEN 800 MG/1
TABLET ORAL
Qty: 30 TABLET | Refills: 0 | Status: SHIPPED | OUTPATIENT
Start: 2017-08-23 | End: 2017-08-29

## 2017-08-23 RX ORDER — POTASSIUM CHLORIDE 750 MG/1
TABLET, FILM COATED, EXTENDED RELEASE ORAL
Qty: 180 TABLET | Refills: 0 | Status: SHIPPED | OUTPATIENT
Start: 2017-08-23 | End: 2017-09-21 | Stop reason: SDUPTHER

## 2017-08-28 ENCOUNTER — TELEPHONE (OUTPATIENT)
Dept: FAMILY MEDICINE CLINIC | Facility: CLINIC | Age: 47
End: 2017-08-28

## 2017-08-28 LAB
ALBUMIN SERPL-MCNC: 4.6 G/DL (ref 3.5–5.2)
ALP SERPL-CCNC: 81 U/L (ref 39–117)
ALT SERPL-CCNC: 81 U/L (ref 1–41)
AST SERPL-CCNC: 66 U/L (ref 1–40)
BILIRUB DIRECT SERPL-MCNC: 0.2 MG/DL (ref 0–0.3)
BILIRUB SERPL-MCNC: 0.9 MG/DL (ref 0.1–1.2)
PROT SERPL-MCNC: 7.7 G/DL (ref 6–8.5)

## 2017-08-28 NOTE — TELEPHONE ENCOUNTER
Stop ibuprofen;  Start daypro 600mg 2 po daily prn pain disp #60 2 ref;  May also take tylenol;  Okay for cane;  Dx lymphedema

## 2017-08-29 LAB
HAV IGM SERPL QL IA: NEGATIVE
HBV CORE IGM SERPL QL IA: NEGATIVE
HBV SURFACE AG SERPL QL IA: NEGATIVE
HCV AB S/CO SERPL IA: <0.1 S/CO RATIO (ref 0–0.9)

## 2017-08-29 NOTE — TELEPHONE ENCOUNTER
Left pt detailed vm. Sent rx  To pharmacy and waiting for signature on sarah's form for cane, will fax when signed

## 2017-08-30 NOTE — PROGRESS NOTES
Call results to patient.  Acute hepatitis profile is negative.  Liver enzymes improved.  Continue work on weight loss.  Also recommend hepatitis B immunization series as not immune on testing.

## 2017-09-21 RX ORDER — ALLOPURINOL 300 MG/1
TABLET ORAL
Qty: 180 TABLET | Refills: 0 | Status: SHIPPED | OUTPATIENT
Start: 2017-09-21 | End: 2017-11-16 | Stop reason: SDUPTHER

## 2017-09-21 RX ORDER — ATENOLOL 50 MG/1
TABLET ORAL
Qty: 180 TABLET | Refills: 0 | Status: SHIPPED | OUTPATIENT
Start: 2017-09-21 | End: 2017-11-16 | Stop reason: SDUPTHER

## 2017-09-21 RX ORDER — IBUPROFEN 800 MG/1
TABLET ORAL
Qty: 30 TABLET | Refills: 2 | Status: SHIPPED | OUTPATIENT
Start: 2017-09-21 | End: 2017-10-18 | Stop reason: SDUPTHER

## 2017-09-21 RX ORDER — POTASSIUM CHLORIDE 750 MG/1
TABLET, EXTENDED RELEASE ORAL
Qty: 180 TABLET | Refills: 0 | Status: SHIPPED | OUTPATIENT
Start: 2017-09-21 | End: 2017-10-18 | Stop reason: SDUPTHER

## 2017-10-18 DIAGNOSIS — J00 ACUTE NASOPHARYNGITIS: ICD-10-CM

## 2017-10-18 RX ORDER — CETIRIZINE HYDROCHLORIDE 10 MG/1
TABLET ORAL
Qty: 30 TABLET | Refills: 0 | Status: SHIPPED | OUTPATIENT
Start: 2017-10-18 | End: 2018-01-23 | Stop reason: SDUPTHER

## 2017-10-18 RX ORDER — IBUPROFEN 800 MG/1
TABLET ORAL
Qty: 30 TABLET | Refills: 1 | Status: SHIPPED | OUTPATIENT
Start: 2017-10-18 | End: 2018-01-23

## 2017-10-18 RX ORDER — LISINOPRIL 10 MG/1
TABLET ORAL
Qty: 30 TABLET | Refills: 1 | Status: SHIPPED | OUTPATIENT
Start: 2017-10-18 | End: 2017-11-16 | Stop reason: SDUPTHER

## 2017-10-18 RX ORDER — POTASSIUM CHLORIDE 750 MG/1
TABLET, EXTENDED RELEASE ORAL
Qty: 180 TABLET | Refills: 0 | Status: SHIPPED | OUTPATIENT
Start: 2017-10-18 | End: 2017-10-25 | Stop reason: SDUPTHER

## 2017-10-23 ENCOUNTER — OFFICE VISIT (OUTPATIENT)
Dept: FAMILY MEDICINE CLINIC | Facility: CLINIC | Age: 47
End: 2017-10-23

## 2017-10-23 VITALS
WEIGHT: 315 LBS | BODY MASS INDEX: 46.13 KG/M2 | TEMPERATURE: 98.6 F | HEART RATE: 76 BPM | DIASTOLIC BLOOD PRESSURE: 80 MMHG | OXYGEN SATURATION: 96 % | SYSTOLIC BLOOD PRESSURE: 132 MMHG

## 2017-10-23 DIAGNOSIS — I89.0 LYMPHEDEMA: ICD-10-CM

## 2017-10-23 DIAGNOSIS — Z23 IMMUNIZATION DUE: ICD-10-CM

## 2017-10-23 DIAGNOSIS — I83.93 VARICOSE VEINS OF BOTH LOWER EXTREMITIES: Primary | ICD-10-CM

## 2017-10-23 PROCEDURE — 99213 OFFICE O/P EST LOW 20 MIN: CPT | Performed by: FAMILY MEDICINE

## 2017-10-23 PROCEDURE — 90471 IMMUNIZATION ADMIN: CPT | Performed by: FAMILY MEDICINE

## 2017-10-23 PROCEDURE — 90686 IIV4 VACC NO PRSV 0.5 ML IM: CPT | Performed by: FAMILY MEDICINE

## 2017-10-23 NOTE — PROGRESS NOTES
Subjective   Marcial Desai is a 47 y.o. male. Presents today for   Chief Complaint   Patient presents with   • Follow-up     legs very swollen flu injection       Lower Extremity Issue   This is a chronic (severe lymphedema) problem. The current episode started more than 1 year ago. The problem occurs constantly. The problem has been unchanged. Associated symptoms include arthralgias and joint swelling. Associated symptoms comments: Patient severe arthralgias;  Hx of gout;  On allopurinol, has not had gout flares, but has severe joint aches.  Has not seen rheumatology in a while.      Has severe varicose veins as well, had for years;  Legs ache tremendously when standing.  Reports ulcerations after wraps in past.  Not seen in lymphedema clinic in a while.   . The symptoms are aggravated by standing and walking. Treatments tried: diureitics, intolerant wraps;  reports gout worsens as well. The treatment provided mild relief.       Review of Systems   Cardiovascular: Positive for leg swelling.   Musculoskeletal: Positive for arthralgias and joint swelling.       The following portions of the patient's history were reviewed and updated as appropriate: allergies, current medications, past medical history and problem list.    Patient Active Problem List   Diagnosis   • Ankle pain   • Atopic dermatitis   • Benign essential hypertension   • Type 2 diabetes mellitus with diabetic polyneuropathy, without long-term current use of insulin   • Diabetic peripheral neuropathy   • Abnormal liver function tests   • Idiopathic chronic gout of multiple sites without tophus   • Hypertriglyceridemia   • Cramps of lower extremity   • Edema of lower extremity   • Lymphedema   • Osteoarthritis       No Known Allergies    Current Outpatient Prescriptions on File Prior to Visit   Medication Sig Dispense Refill   • allopurinol (ZYLOPRIM) 300 MG tablet TAKE ONE TABLET BY MOUTH TWICE A  tablet 0   • atenolol (TENORMIN) 50 MG tablet TAKE ONE  TABLET BY MOUTH TWICE A  tablet 0   • atorvastatin (LIPITOR) 20 MG tablet Take 1 tablet by mouth Every Night. 30 tablet 5   • cetirizine (zyrTEC) 10 MG tablet TAKE ONE TABLET BY MOUTH DAILY 30 tablet 0   • Dapagliflozin Propanediol (FARXIGA) 10 MG tablet Take 10 mg by mouth Daily. 30 tablet 12   • Dulaglutide (TRULICITY) 0.75 MG/0.5ML solution pen-injector Inject 0.75 mg under the skin 1 (One) Time Per Week. 4 pen 5   • fluticasone (FLONASE) 50 MCG/ACT nasal spray PLACE 2 SPRAYS IN EACH NOSTRIL DAILY 16 g 0   • furosemide (LASIX) 40 MG tablet TAKE ONE TABLET BY MOUTH DAILY 90 tablet 0   • gabapentin (NEURONTIN) 300 MG capsule TAKE ONE CAPSULE BY MOUTH THREE TIMES A DAY 90 capsule 5   • hydrocortisone 2.5 % cream Apply  topically 2 (two) times a day. Wipe bottom twice daily 28 g 0   • ibuprofen (ADVIL,MOTRIN) 800 MG tablet Take one tablet by mouth three times daily as needed 30 tablet 1   • lisinopril (PRINIVIL,ZESTRIL) 10 MG tablet TAKE ONE TABLET BY MOUTH DAILY FOR BLOOD PRESSURE 30 tablet 1   • magnesium oxide (MAGOX) 400 (241.3 MG) MG tablet tablet TAKE ONE TABLET BY MOUTH DAILY 90 tablet 0   • metFORMIN (GLUCOPHAGE) 1000 MG tablet TAKE ONE TABLET BY MOUTH EVERY MORNING WITH BREAKFAST 30 tablet 10   • potassium chloride (K-DUR,KLOR-CON) 10 MEQ CR tablet TAKE THREE TABLETS BY MOUTH TWICE A  tablet 0   • potassium chloride ER (K-TAB) 20 MEQ tablet controlled-release ER tablet Take 1 tablet by mouth 2 (Two) Times a Day. 60 tablet 5   • spironolactone (ALDACTONE) 25 MG tablet Take 1 tablet by mouth Daily. 30 tablet 11   • triamcinolone (KENALOG) 0.1 % cream Apply  topically 2 (two) times a day.     • [DISCONTINUED] magnesium oxide (MAGOX) 400 (241.3 Mg) MG tablet tablet TAKE ONE TABLET BY MOUTH DAILY 30 tablet 0   • [DISCONTINUED] oxaprozin (DAYPRO) 600 MG tablet Take 2 tablets by mouth Daily. 60 tablet 2     No current facility-administered medications on file prior to visit.        Objective   Vitals:     10/23/17 0931   BP: 132/80   Pulse: 76   Temp: 98.6 °F (37 °C)   SpO2: 96%   Weight: (!) 379 lb (172 kg)       Physical Exam   Constitutional: He appears well-developed and well-nourished.   Neck: Neck supple.   Musculoskeletal: He exhibits edema (lymphedema severe and varicose veins.).   Neurological: He is alert.   Skin: Skin is warm and dry.   Psychiatric: He has a normal mood and affect. His behavior is normal.   Nursing note and vitals reviewed.      Assessment/Plan   Marcial was seen today for follow-up.    Diagnoses and all orders for this visit:    Varicose veins of both lower extremities  -     Ambulatory Referral to Vascular Surgery  -     Ambulatory Referral to Physical Therapy    Immunization due  -     Flu Vaccine Quad PF 3YR+    Lymphedema  -     Ambulatory Referral to Physical Therapy    -refer to vein specialist as component of varicose veins;  reprots severe pain;  Elevate above level of heart;  Avoid salt and high Na;   Will refer to lymphedema clinic as well through PT.  -up date flu today.         -Follow up: 3 months       Current Outpatient Prescriptions:   •  allopurinol (ZYLOPRIM) 300 MG tablet, TAKE ONE TABLET BY MOUTH TWICE A DAY, Disp: 180 tablet, Rfl: 0  •  atenolol (TENORMIN) 50 MG tablet, TAKE ONE TABLET BY MOUTH TWICE A DAY, Disp: 180 tablet, Rfl: 0  •  atorvastatin (LIPITOR) 20 MG tablet, Take 1 tablet by mouth Every Night., Disp: 30 tablet, Rfl: 5  •  cetirizine (zyrTEC) 10 MG tablet, TAKE ONE TABLET BY MOUTH DAILY, Disp: 30 tablet, Rfl: 0  •  Dapagliflozin Propanediol (FARXIGA) 10 MG tablet, Take 10 mg by mouth Daily., Disp: 30 tablet, Rfl: 12  •  Dulaglutide (TRULICITY) 0.75 MG/0.5ML solution pen-injector, Inject 0.75 mg under the skin 1 (One) Time Per Week., Disp: 4 pen, Rfl: 5  •  fluticasone (FLONASE) 50 MCG/ACT nasal spray, PLACE 2 SPRAYS IN EACH NOSTRIL DAILY, Disp: 16 g, Rfl: 0  •  furosemide (LASIX) 40 MG tablet, TAKE ONE TABLET BY MOUTH DAILY, Disp: 90 tablet, Rfl: 0  •   gabapentin (NEURONTIN) 300 MG capsule, TAKE ONE CAPSULE BY MOUTH THREE TIMES A DAY, Disp: 90 capsule, Rfl: 5  •  hydrocortisone 2.5 % cream, Apply  topically 2 (two) times a day. Wipe bottom twice daily, Disp: 28 g, Rfl: 0  •  ibuprofen (ADVIL,MOTRIN) 800 MG tablet, Take one tablet by mouth three times daily as needed, Disp: 30 tablet, Rfl: 1  •  lisinopril (PRINIVIL,ZESTRIL) 10 MG tablet, TAKE ONE TABLET BY MOUTH DAILY FOR BLOOD PRESSURE, Disp: 30 tablet, Rfl: 1  •  magnesium oxide (MAGOX) 400 (241.3 MG) MG tablet tablet, TAKE ONE TABLET BY MOUTH DAILY, Disp: 90 tablet, Rfl: 0  •  metFORMIN (GLUCOPHAGE) 1000 MG tablet, TAKE ONE TABLET BY MOUTH EVERY MORNING WITH BREAKFAST, Disp: 30 tablet, Rfl: 10  •  potassium chloride (K-DUR,KLOR-CON) 10 MEQ CR tablet, TAKE THREE TABLETS BY MOUTH TWICE A DAY, Disp: 180 tablet, Rfl: 0  •  potassium chloride ER (K-TAB) 20 MEQ tablet controlled-release ER tablet, Take 1 tablet by mouth 2 (Two) Times a Day., Disp: 60 tablet, Rfl: 5  •  spironolactone (ALDACTONE) 25 MG tablet, Take 1 tablet by mouth Daily., Disp: 30 tablet, Rfl: 11  •  triamcinolone (KENALOG) 0.1 % cream, Apply  topically 2 (two) times a day., Disp: , Rfl:

## 2017-10-25 RX ORDER — POTASSIUM CHLORIDE 750 MG/1
TABLET, EXTENDED RELEASE ORAL
Qty: 180 TABLET | Refills: 0 | Status: SHIPPED | OUTPATIENT
Start: 2017-10-25 | End: 2017-12-28 | Stop reason: SDUPTHER

## 2017-10-30 RX ORDER — LANCETS
EACH MISCELLANEOUS
Qty: 100 EACH | Refills: 3 | Status: SHIPPED | OUTPATIENT
Start: 2017-10-30 | End: 2018-03-15 | Stop reason: SDUPTHER

## 2017-11-16 RX ORDER — ATENOLOL 50 MG/1
TABLET ORAL
Qty: 180 TABLET | Refills: 1 | Status: SHIPPED | OUTPATIENT
Start: 2017-11-16 | End: 2018-01-23 | Stop reason: SDUPTHER

## 2017-11-16 RX ORDER — LISINOPRIL 10 MG/1
TABLET ORAL
Qty: 90 TABLET | Refills: 1 | Status: SHIPPED | OUTPATIENT
Start: 2017-11-16 | End: 2018-01-23 | Stop reason: SDUPTHER

## 2017-11-16 RX ORDER — ALLOPURINOL 300 MG/1
TABLET ORAL
Qty: 180 TABLET | Refills: 1 | Status: SHIPPED | OUTPATIENT
Start: 2017-11-16 | End: 2018-02-28 | Stop reason: SDUPTHER

## 2017-11-16 RX ORDER — FUROSEMIDE 40 MG/1
TABLET ORAL
Qty: 90 TABLET | Refills: 1 | Status: SHIPPED | OUTPATIENT
Start: 2017-11-16 | End: 2018-04-12 | Stop reason: SDUPTHER

## 2017-11-17 ENCOUNTER — APPOINTMENT (OUTPATIENT)
Dept: PHYSICAL THERAPY | Facility: HOSPITAL | Age: 47
End: 2017-11-17

## 2017-12-13 ENCOUNTER — TELEPHONE (OUTPATIENT)
Dept: FAMILY MEDICINE CLINIC | Facility: CLINIC | Age: 47
End: 2017-12-13

## 2017-12-13 DIAGNOSIS — I89.0 LYMPHEDEMA: Primary | ICD-10-CM

## 2017-12-13 DIAGNOSIS — I83.893 VARICOSE VEINS OF BOTH LEGS WITH EDEMA: ICD-10-CM

## 2017-12-28 RX ORDER — POTASSIUM CHLORIDE 750 MG/1
TABLET, EXTENDED RELEASE ORAL
Qty: 180 TABLET | Refills: 0 | Status: SHIPPED | OUTPATIENT
Start: 2017-12-28 | End: 2018-02-16 | Stop reason: SDUPTHER

## 2017-12-31 DIAGNOSIS — J00 ACUTE NASOPHARYNGITIS: ICD-10-CM

## 2018-01-02 RX ORDER — CETIRIZINE HYDROCHLORIDE 10 MG/1
TABLET ORAL
Qty: 30 TABLET | Refills: 0 | Status: SHIPPED | OUTPATIENT
Start: 2018-01-02 | End: 2018-01-29 | Stop reason: SDUPTHER

## 2018-01-11 ENCOUNTER — TELEPHONE (OUTPATIENT)
Dept: FAMILY MEDICINE CLINIC | Facility: CLINIC | Age: 48
End: 2018-01-11

## 2018-01-12 RX ORDER — GABAPENTIN 400 MG/1
400 CAPSULE ORAL 3 TIMES DAILY
Qty: 90 CAPSULE | Refills: 5 | OUTPATIENT
Start: 2018-01-12 | End: 2019-06-19 | Stop reason: SDUPTHER

## 2018-01-23 ENCOUNTER — OFFICE VISIT (OUTPATIENT)
Dept: FAMILY MEDICINE CLINIC | Facility: CLINIC | Age: 48
End: 2018-01-23

## 2018-01-23 VITALS
DIASTOLIC BLOOD PRESSURE: 68 MMHG | BODY MASS INDEX: 38.36 KG/M2 | TEMPERATURE: 97.9 F | WEIGHT: 315 LBS | HEIGHT: 76 IN | OXYGEN SATURATION: 98 % | SYSTOLIC BLOOD PRESSURE: 128 MMHG | HEART RATE: 79 BPM

## 2018-01-23 DIAGNOSIS — E78.1 HYPERTRIGLYCERIDEMIA: ICD-10-CM

## 2018-01-23 DIAGNOSIS — F32.2 SEVERE SINGLE CURRENT EPISODE OF MAJOR DEPRESSIVE DISORDER, WITHOUT PSYCHOTIC FEATURES (HCC): ICD-10-CM

## 2018-01-23 DIAGNOSIS — E11.42 TYPE 2 DIABETES MELLITUS WITH DIABETIC POLYNEUROPATHY, WITHOUT LONG-TERM CURRENT USE OF INSULIN (HCC): Primary | ICD-10-CM

## 2018-01-23 DIAGNOSIS — M1A.09X0 IDIOPATHIC CHRONIC GOUT OF MULTIPLE SITES WITHOUT TOPHUS: ICD-10-CM

## 2018-01-23 DIAGNOSIS — I89.0 LYMPHEDEMA: ICD-10-CM

## 2018-01-23 DIAGNOSIS — I10 BENIGN ESSENTIAL HYPERTENSION: ICD-10-CM

## 2018-01-23 DIAGNOSIS — R79.89 ABNORMAL LIVER FUNCTION TESTS: ICD-10-CM

## 2018-01-23 DIAGNOSIS — M79.10 MYALGIA: ICD-10-CM

## 2018-01-23 DIAGNOSIS — R53.83 OTHER FATIGUE: ICD-10-CM

## 2018-01-23 DIAGNOSIS — Z82.61 FAMILY HISTORY OF RHEUMATOID ARTHRITIS: ICD-10-CM

## 2018-01-23 LAB
HCT VFR BLDA CALC: 45.5 %
HGB BLDA-MCNC: 15.5 G/DL
MCH, POC: 29.1
MCHC, POC: 34
MCV, POC: 85.7
PLATELET # BLD AUTO: 151 10*3/MM3
PMV BLD: 9.5 FL
RBC, POC: 5.3
RDW, POC: 14.8
WBC # BLD: 7 10*3/UL

## 2018-01-23 PROCEDURE — 99215 OFFICE O/P EST HI 40 MIN: CPT | Performed by: FAMILY MEDICINE

## 2018-01-23 PROCEDURE — 85027 COMPLETE CBC AUTOMATED: CPT | Performed by: FAMILY MEDICINE

## 2018-01-23 RX ORDER — BUPROPION HYDROCHLORIDE 150 MG/1
150 TABLET ORAL DAILY
Qty: 30 TABLET | Refills: 5 | Status: SHIPPED | OUTPATIENT
Start: 2018-01-23 | End: 2018-02-28 | Stop reason: SDUPTHER

## 2018-01-23 RX ORDER — BLOOD-GLUCOSE METER
EACH MISCELLANEOUS
COMMUNITY
Start: 2017-10-25

## 2018-01-23 RX ORDER — LISINOPRIL 40 MG/1
40 TABLET ORAL DAILY
Qty: 90 TABLET | Refills: 1 | Status: SHIPPED | OUTPATIENT
Start: 2018-01-23 | End: 2018-05-18 | Stop reason: SDUPTHER

## 2018-01-23 RX ORDER — IBUPROFEN 800 MG/1
800 TABLET ORAL EVERY 8 HOURS PRN
Qty: 90 TABLET | Refills: 3 | Status: SHIPPED | OUTPATIENT
Start: 2018-01-23 | End: 2018-05-09 | Stop reason: SDUPTHER

## 2018-01-23 RX ORDER — ATENOLOL 25 MG/1
TABLET ORAL
Qty: 21 TABLET | Refills: 0 | Status: SHIPPED | OUTPATIENT
Start: 2018-01-23 | End: 2018-02-19 | Stop reason: SDUPTHER

## 2018-01-23 NOTE — PROGRESS NOTES
Subjective   Marcial Desai is a 47 y.o. male. Presents today for   Chief Complaint   Patient presents with   • Follow-up     PT HERE FOR 3 MONTH FOLLOW UP VISIT.  HE IS IN A LOT OF PAIN, AND NEEDS SOMETHING STRONGER THAN IBUPROFEN, THE GABAPENTIN INCREASE HAS NOT HELPED.   • Depression     PT WOULD LIKE TREATMENT FOR DEPRESSION.   • Hypertension   • Diabetes   • Hyperlipidemia   • Gout   • Edema       Depression   Visit Type: initial  Onset of symptoms: 1 to 6 months ago  Progression since onset: gradually worsening  Patient presents with the following symptoms: anhedonia, depressed mood, dry mouth, excessive worry, feelings of hopelessness, feelings of worthlessness, impotence, insomnia, irritability, muscle tension, nervousness/anxiety and shortness of breath (with exertion and trying to get up, very deconditioned).  Patient is not experiencing: palpitations, suicidal ideas, suicidal planning, thoughts of death, weight gain and weight loss.  Frequency of symptoms: constantly   Duration: several months.  Severity: incapacitating   Aggravated by: medication (unable to work)  Sleep quality: poor  Nighttime awakenings: many  Risk factors: chronic health conditions as per problem list including diabetes.  Also severe lymphtedema, chronic pain and not able to work.  Disability denied disability x3.  No history of: CAD, depression and suicide attempt  Treatments tried: Has not been on treatment in past.      Hypertension   This is a chronic problem. The current episode started more than 1 year ago. The problem is unchanged. The problem is controlled. Associated symptoms include peripheral edema (lymphedema seeing clinic now with some left, seeing vascular surgeon and to see back after lymphedema clinic) and shortness of breath (with exertion and trying to get up, very deconditioned). Pertinent negatives include no blurred vision, chest pain, orthopnea, palpitations or PND. Risk factors for coronary artery disease include  diabetes mellitus, family history, male gender and obesity. Past treatments include beta blockers and ACE inhibitors. The current treatment provides moderate improvement. There is no history of kidney disease, CAD/MI, CVA or heart failure.   Diabetes   He presents for his follow-up diabetic visit. He has type 2 diabetes mellitus. His disease course has been stable. Hypoglycemia symptoms include nervousness/anxiousness. Pertinent negatives for hypoglycemia include no dizziness. Associated symptoms include fatigue, foot paresthesias, polydipsia and polyuria. Pertinent negatives for diabetes include no blurred vision, no chest pain, no foot ulcerations, no visual change and no weight loss. Diabetic complications include impotence. Pertinent negatives for diabetic complications include no CVA. Risk factors for coronary artery disease include male sex, obesity, diabetes mellitus, dyslipidemia and hypertension. Current diabetic treatment includes diet (trulicity, farxiga and metfomrin). His weight is stable. He is following a generally healthy diet. Home blood sugar record trend: not checking. An ACE inhibitor/angiotensin II receptor blocker is being taken. Eye exam is current (sees ophthalmology).   Hyperlipidemia   This is a chronic problem. The current episode started more than 1 year ago. The problem is controlled (TGS 300s last check;  Also elevated liver enzys noted and acute hep profile negative, due recheck as well.). Recent lipid tests were reviewed and are normal. Associated symptoms include leg pain and shortness of breath (with exertion and trying to get up, very deconditioned). Pertinent negatives include no chest pain, focal sensory loss, focal weakness or myalgias. Current antihyperlipidemic treatment includes statins. The current treatment provides moderate improvement of lipids. There are no compliance problems.  Risk factors for coronary artery disease include diabetes mellitus, dyslipidemia,  hypertension, male sex and obesity.   Gout   This is a chronic problem. The current episode started more than 1 year ago. The problem occurs constantly. The problem has been unchanged. Associated symptoms include arthralgias (legs, but no red swollen joints), congestion, coughing, fatigue, joint swelling and a rash. Pertinent negatives include no abdominal pain (hx of hernia, not bothering or signs of strangulation.), chest pain, chills, fever, myalgias, nausea, visual change or vomiting. Nothing aggravates the symptoms. Treatments tried: on allopurinol. Improvement on treatment: due for recheck.   Arthritis   Presents for initial visit. The disease course has been worsening. He complains of pain and joint swelling. Affected locations include the right knee and right ankle. His pain is at a severity of 8/10. Associated symptoms include dry eyes, dry mouth, fatigue, pain at night, pain while resting and rash. Pertinent negatives include no diarrhea, fever or weight loss. His past medical history is significant for chronic back pain and rheumatoid arthritis. There is no history of lupus or psoriasis.   (Patient miguelina after MRI of right knee and ankle told had RA, miguelina was on medication specifically;  Miguelina was referred to somebody to tx.  he doesn't recall who saw.  This is new information to me and do not have.  Hx of gout) His family medical history includes family history of rheumatoid arthritis (Both Mom & Dad had RA.). Past treatments include NSAIDs and allopurinol. The treatment provided no relief. Factors aggravating his arthritis include activity, climbing stairs, descending stairs, entering/exiting a vehicle, exposure to cold air and lifting.   Miguelina ibuprofen does help with pain, its the increase in gabapentin and daypro not helping much.    No prior knee surgerys;  Non-smoker;     Review of Systems   Constitutional: Positive for fatigue and irritability. Negative for chills, fever, weight gain and  weight loss.   HENT: Positive for congestion and postnasal drip. Negative for sinus pain and sinus pressure.    Eyes: Negative for blurred vision, pain and visual disturbance.   Respiratory: Positive for cough and shortness of breath (with exertion and trying to get up, very deconditioned). Negative for wheezing.    Cardiovascular: Positive for leg swelling. Negative for chest pain, palpitations, orthopnea and PND.   Gastrointestinal: Negative for abdominal pain (hx of hernia, not bothering or signs of strangulation.), diarrhea, nausea and vomiting.   Endocrine: Positive for polydipsia and polyuria.   Genitourinary: Positive for frequency and impotence. Negative for difficulty urinating.   Musculoskeletal: Positive for arthralgias (legs, but no red swollen joints), arthritis, back pain, gait problem, gout and joint swelling. Negative for myalgias.   Skin: Positive for rash. Negative for wound.   Allergic/Immunologic: Negative for environmental allergies and immunocompromised state.   Neurological: Negative for dizziness, focal weakness and syncope.   Hematological: Negative for adenopathy.   Psychiatric/Behavioral: Positive for sleep disturbance. Negative for self-injury and suicidal ideas. The patient is nervous/anxious and has insomnia.        The following portions of the patient's history were reviewed and updated as appropriate: allergies, current medications, past family history, past medical history, past social history, past surgical history and problem list.    Patient Active Problem List   Diagnosis   • Ankle pain   • Atopic dermatitis   • Benign essential hypertension   • Type 2 diabetes mellitus with diabetic polyneuropathy, without long-term current use of insulin   • Diabetic peripheral neuropathy   • Abnormal liver function tests   • Idiopathic chronic gout of multiple sites without tophus   • Hypertriglyceridemia   • Cramps of lower extremity   • Edema of lower extremity   • Lymphedema   •  Osteoarthritis       No Known Allergies    Current Outpatient Prescriptions on File Prior to Visit   Medication Sig Dispense Refill   • ACCU-CHEK SOFTCLIX LANCETS lancets Use as instructed to test glucose once daily. Diagnosis E11.9 100 each 3   • allopurinol (ZYLOPRIM) 300 MG tablet TAKE ONE TABLET BY MOUTH TWICE A  tablet 1   • atenolol (TENORMIN) 50 MG tablet TAKE ONE TABLET BY MOUTH TWICE A  tablet 1   • atorvastatin (LIPITOR) 20 MG tablet Take 1 tablet by mouth Every Night. 30 tablet 5   • cetirizine (zyrTEC) 10 MG tablet TAKE ONE TABLET BY MOUTH DAILY 30 tablet 0   • Dapagliflozin Propanediol (FARXIGA) 10 MG tablet Take 10 mg by mouth Daily. 30 tablet 12   • Dulaglutide (TRULICITY) 0.75 MG/0.5ML solution pen-injector Inject 0.75 mg under the skin 1 (One) Time Per Week. 4 pen 5   • fluticasone (FLONASE) 50 MCG/ACT nasal spray PLACE 2 SPRAYS IN EACH NOSTRIL DAILY 16 g 0   • furosemide (LASIX) 40 MG tablet TAKE ONE TABLET BY MOUTH DAILY 90 tablet 1   • gabapentin (NEURONTIN) 400 MG capsule Take 1 capsule by mouth 3 (Three) Times a Day. 90 capsule 5   • glucose blood (ACCU-CHEK TYREL PLUS) test strip Use as instructed to test glucose once daily. Diagnosis E11.9 100 each 3   • hydrocortisone 2.5 % cream Apply  topically 2 (two) times a day. Wipe bottom twice daily 28 g 0   • lisinopril (PRINIVIL,ZESTRIL) 10 MG tablet TAKE ONE TABLET BY MOUTH DAILY FOR BLOOD PRESSURE 90 tablet 1   • magnesium oxide (MAGOX) 400 (241.3 MG) MG tablet tablet TAKE ONE TABLET BY MOUTH DAILY 90 tablet 0   • metFORMIN (GLUCOPHAGE) 1000 MG tablet TAKE ONE TABLET BY MOUTH EVERY MORNING WITH BREAKFAST 30 tablet 10   • oxaprozin (DAYPRO) 600 MG tablet TAKE TWO TABLETS BY MOUTH DAILY 180 tablet 1   • spironolactone (ALDACTONE) 25 MG tablet Take 1 tablet by mouth Daily. 30 tablet 11   • triamcinolone (KENALOG) 0.1 % cream Apply  topically 2 (two) times a day.     • potassium chloride (K-DUR,KLOR-CON) 10 MEQ CR tablet TAKE THREE  "TABLETS BY MOUTH TWICE A  tablet 0   • [DISCONTINUED] cetirizine (zyrTEC) 10 MG tablet TAKE ONE TABLET BY MOUTH DAILY 30 tablet 0   • [DISCONTINUED] ibuprofen (ADVIL,MOTRIN) 800 MG tablet Take one tablet by mouth three times daily as needed 30 tablet 1   • [DISCONTINUED] magnesium oxide (MAGOX) 400 (241.3 Mg) MG tablet tablet TAKE ONE TABLET BY MOUTH DAILY 30 tablet 0   • [DISCONTINUED] magnesium oxide (MAGOX) 400 (241.3 Mg) MG tablet tablet TAKE ONE TABLET BY MOUTH DAILY 30 tablet 0   • [DISCONTINUED] metFORMIN (GLUCOPHAGE) 1000 MG tablet TAKE ONE TABLET BY MOUTH EVERY MORNING WITH BREAKFAST 30 tablet 10   • [DISCONTINUED] potassium chloride ER (K-TAB) 20 MEQ tablet controlled-release ER tablet Take 1 tablet by mouth 2 (Two) Times a Day. 60 tablet 5     No current facility-administered medications on file prior to visit.        Objective   Vitals:    01/23/18 0841   BP: 128/68   BP Location: Left arm   Patient Position: Sitting   Cuff Size: Large Adult   Pulse: 79   Temp: 97.9 °F (36.6 °C)   TempSrc: Oral   SpO2: 98%   Weight: (!) 172 kg (379 lb)   Height: 193 cm (75.98\")       Physical Exam   Constitutional: He appears well-developed and well-nourished.   HENT:   Head: Normocephalic and atraumatic. Head is without raccoon's eyes and without Loya's sign.   Right Ear: Tympanic membrane normal.   Left Ear: Tympanic membrane normal.   Nose: Mucosal edema present.   Mouth/Throat: Uvula is midline, oropharynx is clear and moist and mucous membranes are normal.   Eyes: Conjunctivae are normal. Pupils are equal, round, and reactive to light.   Neck: Neck supple. No JVD present. No thyromegaly present.   Cardiovascular: Normal rate, regular rhythm and normal heart sounds.  Exam reveals no gallop and no friction rub.    No murmur heard.  Pulmonary/Chest: Effort normal and breath sounds normal. No respiratory distress. He has no wheezes. He has no rales.   Abdominal: Soft. Bowel sounds are normal. He exhibits no " distension. There is no tenderness. There is no rebound and no guarding.   Musculoskeletal: He exhibits no edema.   Neurological: He is alert. Gait abnormal.   Skin: Skin is warm and dry.   Psychiatric: His behavior is normal. He is not actively hallucinating. He exhibits a depressed mood.   Nursing note and vitals reviewed.    Last a1c 7.6%  Assessment/Plan   Marcial was seen today for follow-up, depression, hypertension, diabetes, hyperlipidemia, gout and edema.    Diagnoses and all orders for this visit:    Type 2 diabetes mellitus with diabetic polyneuropathy, without long-term current use of insulin  -     Comprehensive Metabolic Panel  -     Lipid Panel  -     Uric Acid  -     Hemoglobin A1c  -     POC CBC  -     JUANCARLOS With / DsDNA, RNP, Sjogrens A / B, Smith  -     Cyclic Citrul Peptide Antibody, IgG / IgA  -     Sedimentation Rate  -     C-reactive Protein  -     TSH  -     Vitamin B12  -     Anti-Smooth Muscle Antibody Titer  -     CK  -     Magnesium  -     Ceruloplasmin  -     Rheumatoid Factor, Quant  -     Testosterone  -     Mitochondrial Antibodies, M2    Benign essential hypertension  -     Comprehensive Metabolic Panel  -     Lipid Panel  -     Uric Acid  -     Hemoglobin A1c  -     POC CBC  -     JUANCARLOS With / DsDNA, RNP, Sjogrens A / B, Smith  -     Cyclic Citrul Peptide Antibody, IgG / IgA  -     Sedimentation Rate  -     C-reactive Protein  -     TSH  -     Vitamin B12  -     Anti-Smooth Muscle Antibody Titer  -     CK  -     Magnesium  -     Ceruloplasmin  -     Rheumatoid Factor, Quant  -     Testosterone  -     Mitochondrial Antibodies, M2  -     atenolol (TENORMIN) 25 MG tablet; 1 po twice daily x 7 days, then 1/2 po twice daily then stop  -     lisinopril (PRINIVIL,ZESTRIL) 40 MG tablet; Take 1 tablet by mouth Daily.    Hypertriglyceridemia  -     Comprehensive Metabolic Panel  -     Lipid Panel  -     Uric Acid  -     Hemoglobin A1c  -     POC CBC  -     JUANCARLOS With / DsDNA, RNP, Sjogrens A / B,  Lee  -     Cyclic Citrul Peptide Antibody, IgG / IgA  -     Sedimentation Rate  -     C-reactive Protein  -     TSH  -     Vitamin B12  -     Anti-Smooth Muscle Antibody Titer  -     CK  -     Magnesium  -     Ceruloplasmin  -     Rheumatoid Factor, Quant  -     Testosterone  -     Mitochondrial Antibodies, M2    Idiopathic chronic gout of multiple sites without tophus  -     Comprehensive Metabolic Panel  -     Lipid Panel  -     Uric Acid  -     Hemoglobin A1c  -     POC CBC  -     JUANCARLOS With / DsDNA, RNP, Sjogrens A / B, Smith  -     Cyclic Citrul Peptide Antibody, IgG / IgA  -     Sedimentation Rate  -     C-reactive Protein  -     TSH  -     Vitamin B12  -     Anti-Smooth Muscle Antibody Titer  -     CK  -     Magnesium  -     Ceruloplasmin  -     Rheumatoid Factor, Quant  -     Testosterone  -     Mitochondrial Antibodies, M2  -     Ambulatory Referral to Rheumatology  -     ibuprofen (ADVIL,MOTRIN) 800 MG tablet; Take 1 tablet by mouth Every 8 (Eight) Hours As Needed for Mild Pain  or Moderate Pain .    Lymphedema  -     Comprehensive Metabolic Panel  -     Lipid Panel  -     Uric Acid  -     Hemoglobin A1c  -     POC CBC  -     JUANCARLOS With / DsDNA, RNP, Sjogrens A / B, Smith  -     Cyclic Citrul Peptide Antibody, IgG / IgA  -     Sedimentation Rate  -     C-reactive Protein  -     TSH  -     Vitamin B12  -     Anti-Smooth Muscle Antibody Titer  -     CK  -     Magnesium  -     Ceruloplasmin  -     Rheumatoid Factor, Quant  -     Testosterone  -     Mitochondrial Antibodies, M2    Abnormal liver function tests  -     Comprehensive Metabolic Panel  -     Lipid Panel  -     Uric Acid  -     Hemoglobin A1c  -     POC CBC  -     JUANCARLOS With / DsDNA, RNP, Sjogrens A / B, Smith  -     Cyclic Citrul Peptide Antibody, IgG / IgA  -     Sedimentation Rate  -     C-reactive Protein  -     TSH  -     Vitamin B12  -     Anti-Smooth Muscle Antibody Titer  -     CK  -     Magnesium  -     Ceruloplasmin  -     Rheumatoid Factor,  Quant  -     Testosterone  -     Mitochondrial Antibodies, M2    Myalgia  -     Comprehensive Metabolic Panel  -     Lipid Panel  -     Uric Acid  -     Hemoglobin A1c  -     POC CBC  -     JUANCARLOS With / DsDNA, RNP, Sjogrens A / B, Smith  -     Cyclic Citrul Peptide Antibody, IgG / IgA  -     Sedimentation Rate  -     C-reactive Protein  -     TSH  -     Vitamin B12  -     Anti-Smooth Muscle Antibody Titer  -     CK  -     Magnesium  -     Ceruloplasmin  -     Rheumatoid Factor, Quant  -     Testosterone  -     Mitochondrial Antibodies, M2    Other fatigue  -     Comprehensive Metabolic Panel  -     Lipid Panel  -     Uric Acid  -     Hemoglobin A1c  -     POC CBC  -     JUANCARLOS With / DsDNA, RNP, Sjogrens A / B, Smith  -     Cyclic Citrul Peptide Antibody, IgG / IgA  -     Sedimentation Rate  -     C-reactive Protein  -     TSH  -     Vitamin B12  -     Anti-Smooth Muscle Antibody Titer  -     CK  -     Magnesium  -     Ceruloplasmin  -     Rheumatoid Factor, Quant  -     Testosterone  -     Mitochondrial Antibodies, M2    Family history of rheumatoid arthritis  -     Comprehensive Metabolic Panel  -     Lipid Panel  -     Uric Acid  -     Hemoglobin A1c  -     POC CBC  -     JUANCARLOS With / DsDNA, RNP, Sjogrens A / B, Smith  -     Cyclic Citrul Peptide Antibody, IgG / IgA  -     Sedimentation Rate  -     C-reactive Protein  -     TSH  -     Vitamin B12  -     Anti-Smooth Muscle Antibody Titer  -     CK  -     Magnesium  -     Ceruloplasmin  -     Rheumatoid Factor, Quant  -     Testosterone  -     Mitochondrial Antibodies, M2  -     Ambulatory Referral to Rheumatology    Severe single current episode of major depressive disorder, without psychotic features  -     buPROPion XL (WELLBUTRIN XL) 150 MG 24 hr tablet; Take 1 tablet by mouth Daily.    -wean off beta blocker as can exacerbate fatigue, mood and impotence;  increase acei to 40mg to cover  -seek care immediately if thoughts of self-harm, d/w options and will wean off  beta blocker and start wellbutrin as more weight neutral  -patient reports had RA and seeing a specialist at one time for medication but doesn't recall name, do not have records.  Relates was told had RA noted on right knee and ankle MRI, specifically states not OA as both parents had dx of RA per patient.   Patient does not have b/l symmetric joint pain.  Primary right knee and ankle with chronic back pain.  Reports dry eyes and mouth.   I do not have any serological testing and will check.  He does have hx of gout, ? Flares and what seen for in past, will repeat labs;  Continue allopurinol;  Refer to rheumatology after labs back.  -relates PT told him he should have MRI of both legs in entirety, d/w not likely covered and will discuss plain film imaging at f/u.  Consider ortho referral as well.  -reviewed paco  -dm2 - continue medications, continue work on weight loss.  Impotency, could be related to dm2, low T, beta blocker or underlying depression;    -fatigue - check cbc, tsh, b12, testosterone  -leg cramps - labs as noted, could be related to lower ext issues and/or medication.    -hypertension - controlled, continue medications  -HLD - continue statin, recheck lipids.  -elevated liver enzymes, suspect DOYLE, will check CLD labs as well      Spent >40 minutes with patient with >50% in counseling regarding depression, lymphedema, fatigue, arthritis.       -Follow up: 6 weeks       Current Outpatient Prescriptions:   •  ACCU-CHEK SOFTCLIX LANCETS lancets, Use as instructed to test glucose once daily. Diagnosis E11.9, Disp: 100 each, Rfl: 3  •  allopurinol (ZYLOPRIM) 300 MG tablet, TAKE ONE TABLET BY MOUTH TWICE A DAY, Disp: 180 tablet, Rfl: 1  •  atenolol (TENORMIN) 50 MG tablet, TAKE ONE TABLET BY MOUTH TWICE A DAY, Disp: 180 tablet, Rfl: 1  •  atorvastatin (LIPITOR) 20 MG tablet, Take 1 tablet by mouth Every Night., Disp: 30 tablet, Rfl: 5  •  Blood Glucose Monitoring Suppl (ACCU-CHEK TYREL PLUS) w/Device  kit, , Disp: , Rfl:   •  cetirizine (zyrTEC) 10 MG tablet, TAKE ONE TABLET BY MOUTH DAILY, Disp: 30 tablet, Rfl: 0  •  Dapagliflozin Propanediol (FARXIGA) 10 MG tablet, Take 10 mg by mouth Daily., Disp: 30 tablet, Rfl: 12  •  Dulaglutide (TRULICITY) 0.75 MG/0.5ML solution pen-injector, Inject 0.75 mg under the skin 1 (One) Time Per Week., Disp: 4 pen, Rfl: 5  •  fluticasone (FLONASE) 50 MCG/ACT nasal spray, PLACE 2 SPRAYS IN EACH NOSTRIL DAILY, Disp: 16 g, Rfl: 0  •  furosemide (LASIX) 40 MG tablet, TAKE ONE TABLET BY MOUTH DAILY, Disp: 90 tablet, Rfl: 1  •  gabapentin (NEURONTIN) 400 MG capsule, Take 1 capsule by mouth 3 (Three) Times a Day., Disp: 90 capsule, Rfl: 5  •  glucose blood (ACCU-CHEK TYREL PLUS) test strip, Use as instructed to test glucose once daily. Diagnosis E11.9, Disp: 100 each, Rfl: 3  •  hydrocortisone 2.5 % cream, Apply  topically 2 (two) times a day. Wipe bottom twice daily, Disp: 28 g, Rfl: 0  •  lisinopril (PRINIVIL,ZESTRIL) 10 MG tablet, TAKE ONE TABLET BY MOUTH DAILY FOR BLOOD PRESSURE, Disp: 90 tablet, Rfl: 1  •  magnesium oxide (MAGOX) 400 (241.3 MG) MG tablet tablet, TAKE ONE TABLET BY MOUTH DAILY, Disp: 90 tablet, Rfl: 0  •  metFORMIN (GLUCOPHAGE) 1000 MG tablet, TAKE ONE TABLET BY MOUTH EVERY MORNING WITH BREAKFAST, Disp: 30 tablet, Rfl: 10  •  oxaprozin (DAYPRO) 600 MG tablet, TAKE TWO TABLETS BY MOUTH DAILY, Disp: 180 tablet, Rfl: 1  •  spironolactone (ALDACTONE) 25 MG tablet, Take 1 tablet by mouth Daily., Disp: 30 tablet, Rfl: 11  •  triamcinolone (KENALOG) 0.1 % cream, Apply  topically 2 (two) times a day., Disp: , Rfl:   •  potassium chloride (K-DUR,KLOR-CON) 10 MEQ CR tablet, TAKE THREE TABLETS BY MOUTH TWICE A DAY, Disp: 180 tablet, Rfl: 0

## 2018-01-25 ENCOUNTER — TELEPHONE (OUTPATIENT)
Dept: FAMILY MEDICINE CLINIC | Facility: CLINIC | Age: 48
End: 2018-01-25

## 2018-01-25 LAB
ACTIN IGG SERPL-ACNC: 6 UNITS (ref 0–19)
ALBUMIN SERPL-MCNC: 4.3 G/DL (ref 3.5–5.2)
ALBUMIN/GLOB SERPL: 1.4 G/DL
ALP SERPL-CCNC: 90 U/L (ref 39–117)
ALT SERPL-CCNC: 51 U/L (ref 1–41)
ANA SER QL: NEGATIVE
AST SERPL-CCNC: 49 U/L (ref 1–40)
BILIRUB SERPL-MCNC: 0.6 MG/DL (ref 0.1–1.2)
BUN SERPL-MCNC: 14 MG/DL (ref 6–20)
BUN/CREAT SERPL: 13.6 (ref 7–25)
CALCIUM SERPL-MCNC: 9.4 MG/DL (ref 8.6–10.5)
CCP IGA+IGG SERPL IA-ACNC: 5 UNITS (ref 0–19)
CERULOPLASMIN SERPL-MCNC: 34.9 MG/DL (ref 16–31)
CHLORIDE SERPL-SCNC: 96 MMOL/L (ref 98–107)
CHOLEST SERPL-MCNC: 122 MG/DL (ref 0–200)
CK SERPL-CCNC: 160 U/L (ref 20–200)
CO2 SERPL-SCNC: 26.7 MMOL/L (ref 22–29)
CREAT SERPL-MCNC: 1.03 MG/DL (ref 0.76–1.27)
CRP SERPL-MCNC: 0.43 MG/DL (ref 0–0.5)
ERYTHROCYTE [SEDIMENTATION RATE] IN BLOOD BY WESTERGREN METHOD: 5 MM/HR (ref 0–15)
GFR SERPLBLD CREATININE-BSD FMLA CKD-EPI: 78 ML/MIN/1.73
GFR SERPLBLD CREATININE-BSD FMLA CKD-EPI: 94 ML/MIN/1.73
GLOBULIN SER CALC-MCNC: 3.1 GM/DL
GLUCOSE SERPL-MCNC: 138 MG/DL (ref 65–99)
HBA1C MFR BLD: 7.15 % (ref 4.8–5.6)
HDLC SERPL-MCNC: 30 MG/DL (ref 40–60)
LDLC SERPL CALC-MCNC: 42 MG/DL (ref 0–100)
MAGNESIUM SERPL-MCNC: 2.1 MG/DL (ref 1.6–2.6)
MITOCHONDRIA M2 IGG SER-ACNC: <20 UNITS (ref 0–20)
POTASSIUM SERPL-SCNC: 4.2 MMOL/L (ref 3.5–5.2)
PROT SERPL-MCNC: 7.4 G/DL (ref 6–8.5)
RHEUMATOID FACT SERPL-ACNC: <10 IU/ML (ref 0–13.9)
SODIUM SERPL-SCNC: 138 MMOL/L (ref 136–145)
TESTOST SERPL-MCNC: 243 NG/DL (ref 264–916)
TRIGL SERPL-MCNC: 248 MG/DL (ref 0–150)
TSH SERPL DL<=0.005 MIU/L-ACNC: 2.21 MIU/ML (ref 0.27–4.2)
URATE SERPL-MCNC: 4.5 MG/DL (ref 3.4–7)
VIT B12 SERPL-MCNC: 437 PG/ML (ref 211–946)
VLDLC SERPL CALC-MCNC: 49.6 MG/DL (ref 5–40)

## 2018-01-25 NOTE — TELEPHONE ENCOUNTER
Pt informed that 109/58 is not a bad number, to just keep checking occasionally and if it goes lower to let us know. He just started taking wellbutrin a couple days ago, and the feeling he described (weak, not feeling right) is most likely due to starting a new antidepressant.

## 2018-01-28 DIAGNOSIS — E78.1 HYPERTRIGLYCERIDEMIA: ICD-10-CM

## 2018-01-28 DIAGNOSIS — E66.01 MORBID OBESITY DUE TO EXCESS CALORIES (HCC): ICD-10-CM

## 2018-01-28 DIAGNOSIS — E11.42 TYPE 2 DIABETES MELLITUS WITH DIABETIC POLYNEUROPATHY, WITHOUT LONG-TERM CURRENT USE OF INSULIN (HCC): ICD-10-CM

## 2018-01-28 DIAGNOSIS — J00 ACUTE NASOPHARYNGITIS: ICD-10-CM

## 2018-01-29 RX ORDER — ATORVASTATIN CALCIUM 20 MG/1
TABLET, FILM COATED ORAL
Qty: 30 TABLET | Refills: 4 | Status: SHIPPED | OUTPATIENT
Start: 2018-01-29 | End: 2018-02-28 | Stop reason: SDUPTHER

## 2018-01-29 RX ORDER — CETIRIZINE HYDROCHLORIDE 10 MG/1
TABLET ORAL
Qty: 30 TABLET | Refills: 0 | Status: SHIPPED | OUTPATIENT
Start: 2018-01-29 | End: 2018-02-16 | Stop reason: SDUPTHER

## 2018-01-29 RX ORDER — DULAGLUTIDE 0.75 MG/.5ML
INJECTION, SOLUTION SUBCUTANEOUS
Qty: 2 PEN | Refills: 4 | Status: SHIPPED | OUTPATIENT
Start: 2018-01-29 | End: 2018-06-15 | Stop reason: SDUPTHER

## 2018-01-29 RX ORDER — TRIAMCINOLONE ACETONIDE 1 MG/G
CREAM TOPICAL
Qty: 30 G | Refills: 1 | Status: SHIPPED | OUTPATIENT
Start: 2018-01-29 | End: 2018-03-15 | Stop reason: SDUPTHER

## 2018-01-29 RX ORDER — DAPAGLIFLOZIN 10 MG/1
TABLET, FILM COATED ORAL
Qty: 30 TABLET | Refills: 11 | Status: SHIPPED | OUTPATIENT
Start: 2018-01-29 | End: 2018-02-28 | Stop reason: SDUPTHER

## 2018-01-30 NOTE — PROGRESS NOTES
Diabetes improved controlled.  Cholesterol is adequately controlled.  Triglyerides mildly elevated  Elevated liver enzymes improved, autoimmune labs are normal.  Uric acid level low  Blood counts ok  Testosterone is low, recheck total testosterone between 8 and 9am dx hypogonadism to ensure truly low then can discuss treatment.  Rest of labs normal or stable.

## 2018-01-31 DIAGNOSIS — E29.1 HYPOGONADISM IN MALE: Primary | ICD-10-CM

## 2018-02-01 ENCOUNTER — CLINICAL SUPPORT (OUTPATIENT)
Dept: FAMILY MEDICINE CLINIC | Facility: CLINIC | Age: 48
End: 2018-02-01

## 2018-02-01 VITALS
HEART RATE: 82 BPM | WEIGHT: 315 LBS | DIASTOLIC BLOOD PRESSURE: 76 MMHG | BODY MASS INDEX: 44.94 KG/M2 | OXYGEN SATURATION: 97 % | SYSTOLIC BLOOD PRESSURE: 112 MMHG

## 2018-02-02 LAB — TESTOST SERPL-MCNC: 265 NG/DL (ref 264–916)

## 2018-02-16 DIAGNOSIS — J00 ACUTE NASOPHARYNGITIS: ICD-10-CM

## 2018-02-16 RX ORDER — POTASSIUM CHLORIDE 750 MG/1
TABLET, EXTENDED RELEASE ORAL
Qty: 180 TABLET | Refills: 1 | Status: SHIPPED | OUTPATIENT
Start: 2018-02-16 | End: 2018-04-12 | Stop reason: SDUPTHER

## 2018-02-16 RX ORDER — CETIRIZINE HYDROCHLORIDE 10 MG/1
TABLET ORAL
Qty: 90 TABLET | Refills: 0 | Status: SHIPPED | OUTPATIENT
Start: 2018-02-16 | End: 2018-04-12 | Stop reason: SDUPTHER

## 2018-02-19 ENCOUNTER — OFFICE VISIT (OUTPATIENT)
Dept: FAMILY MEDICINE CLINIC | Facility: CLINIC | Age: 48
End: 2018-02-19

## 2018-02-19 VITALS
WEIGHT: 315 LBS | HEART RATE: 101 BPM | TEMPERATURE: 98.5 F | DIASTOLIC BLOOD PRESSURE: 70 MMHG | SYSTOLIC BLOOD PRESSURE: 140 MMHG | BODY MASS INDEX: 46.15 KG/M2

## 2018-02-19 DIAGNOSIS — I10 BENIGN ESSENTIAL HYPERTENSION: ICD-10-CM

## 2018-02-19 DIAGNOSIS — E29.1 HYPOGONADISM IN MALE: Primary | ICD-10-CM

## 2018-02-19 PROCEDURE — 99213 OFFICE O/P EST LOW 20 MIN: CPT | Performed by: FAMILY MEDICINE

## 2018-02-19 RX ORDER — ATENOLOL 25 MG/1
TABLET ORAL
Qty: 60 TABLET | Refills: 5 | Status: SHIPPED | OUTPATIENT
Start: 2018-02-19 | End: 2018-02-28 | Stop reason: SDUPTHER

## 2018-02-19 RX ORDER — TESTOSTERONE 20.25 MG/1.25G
GEL TOPICAL
Qty: 1.25 G | Refills: 5 | Status: SHIPPED | OUTPATIENT
Start: 2018-02-19 | End: 2018-11-19

## 2018-02-19 NOTE — PROGRESS NOTES
Subjective   Marcial Desai is a 47 y.o. male. Presents today for   Chief Complaint   Patient presents with   • Follow-up     lab results testosterone consult       History of Present Illness  Patient with chronic fatigue ongoing for several years, no libido;   No cp/soa;  Has not tried any tx in past. Is intersted in testosterone tx;  preferrably topical as found to be low on 2 checks.  Has lymphedema compression permanent now.   Reports nor sores, declines dm foot check.  Hx of arterial htn for several years off atenolol bp running high despite increase in lisinopril.  Did not see difference in libido or fatigue off.    Review of Systems   Constitutional: Positive for fatigue.   Respiratory: Negative for shortness of breath.    Cardiovascular: Positive for leg swelling. Negative for chest pain.       The following portions of the patient's history were reviewed and updated as appropriate: allergies, current medications, past medical history and problem list.    Patient Active Problem List   Diagnosis   • Ankle pain   • Atopic dermatitis   • Benign essential hypertension   • Type 2 diabetes mellitus with diabetic polyneuropathy, without long-term current use of insulin   • Diabetic peripheral neuropathy   • Abnormal liver function tests   • Idiopathic chronic gout of multiple sites without tophus   • Hypertriglyceridemia   • Cramps of lower extremity   • Edema of lower extremity   • Lymphedema   • Osteoarthritis       No Known Allergies    Current Outpatient Prescriptions on File Prior to Visit   Medication Sig Dispense Refill   • ACCU-CHEK SOFTCLIX LANCETS lancets Use as instructed to test glucose once daily. Diagnosis E11.9 100 each 3   • allopurinol (ZYLOPRIM) 300 MG tablet TAKE ONE TABLET BY MOUTH TWICE A  tablet 1   • atenolol (TENORMIN) 25 MG tablet 1 po twice daily x 7 days, then 1/2 po twice daily then stop 21 tablet 0   • atorvastatin (LIPITOR) 20 MG tablet TAKE ONE TABLET BY MOUTH EVERY NIGHT 30 tablet  4   • Blood Glucose Monitoring Suppl (ACCU-CHEK TYREL PLUS) w/Device kit      • buPROPion XL (WELLBUTRIN XL) 150 MG 24 hr tablet Take 1 tablet by mouth Daily. 30 tablet 5   • cetirizine (zyrTEC) 10 MG tablet TAKE ONE TABLET BY MOUTH DAILY 90 tablet 0   • FARXIGA 10 MG tablet TAKE ONE TABLET BY MOUTH DAILY 30 tablet 11   • fluticasone (FLONASE) 50 MCG/ACT nasal spray PLACE 2 SPRAYS IN EACH NOSTRIL DAILY 16 g 0   • furosemide (LASIX) 40 MG tablet TAKE ONE TABLET BY MOUTH DAILY 90 tablet 1   • gabapentin (NEURONTIN) 400 MG capsule Take 1 capsule by mouth 3 (Three) Times a Day. 90 capsule 5   • glucose blood (ACCU-CHEK TYREL PLUS) test strip Use as instructed to test glucose once daily. Diagnosis E11.9 100 each 3   • hydrocortisone 2.5 % cream Apply  topically 2 (two) times a day. Wipe bottom twice daily 28 g 0   • ibuprofen (ADVIL,MOTRIN) 800 MG tablet Take 1 tablet by mouth Every 8 (Eight) Hours As Needed for Mild Pain  or Moderate Pain . 90 tablet 3   • lisinopril (PRINIVIL,ZESTRIL) 40 MG tablet Take 1 tablet by mouth Daily. 90 tablet 1   • magnesium oxide (MAGOX) 400 (241.3 Mg) MG tablet tablet TAKE ONE TABLET BY MOUTH DAILY 90 tablet 0   • metFORMIN (GLUCOPHAGE) 1000 MG tablet TAKE ONE TABLET BY MOUTH EVERY MORNING WITH BREAKFAST 30 tablet 10   • potassium chloride (K-DUR,KLOR-CON) 10 MEQ CR tablet TAKE THREE TABLETS BY MOUTH TWICE A  tablet 1   • spironolactone (ALDACTONE) 25 MG tablet Take 1 tablet by mouth Daily. 30 tablet 11   • triamcinolone (KENALOG) 0.1 % cream APPLY A THIN LAYER TO AFFECTED AREA(S) TWO TIMES A DAY 30 g 1   • TRULICITY 0.75 MG/0.5ML solution pen-injector INJECT ONE SYRINGE (0.75MG) UNDER THE SKIN ONCE WEEKLY 2 pen 4   • [DISCONTINUED] magnesium oxide (MAGOX) 400 (241.3 Mg) MG tablet tablet TAKE ONE TABLET BY MOUTH DAILY 30 tablet 0     No current facility-administered medications on file prior to visit.        Objective   Vitals:    02/19/18 0944   BP: 140/70   Pulse: 101   Temp: 98.5  °F (36.9 °C)   Weight: (!) 172 kg (379 lb)       Physical Exam   Constitutional: He appears well-developed and well-nourished.   HENT:   Head: Normocephalic and atraumatic.   Neck: Neck supple. No JVD present. No thyromegaly present.   Cardiovascular: Normal rate, regular rhythm and normal heart sounds.  Exam reveals no gallop and no friction rub.    No murmur heard.  Pulmonary/Chest: Effort normal and breath sounds normal. No respiratory distress. He has no wheezes. He has no rales.   Abdominal: Soft. Bowel sounds are normal. He exhibits no distension. There is no tenderness. There is no rebound and no guarding.   Musculoskeletal: He exhibits edema.   Neurological: He is alert.   Skin: Skin is warm and dry.   Psychiatric: He has a normal mood and affect. His behavior is normal.   Nursing note and vitals reviewed.      Assessment/Plan   Marcial was seen today for follow-up.    Diagnoses and all orders for this visit:    Hypogonadism in male  -     Testosterone 20.25 MG/1.25GM (1.62%) gel; 1 pump each armpit daily    Benign essential hypertension  -     atenolol (TENORMIN) 25 MG tablet; 1 po daily      I explained at length to patient treatment options, risks of replacement that can include acne, mood changes, ? Cardiovascular disease, polycythemia and liver dysfunction.  I also warned about concerns regarding prostate and breast cancer.   I discussed with potential for improvement in fatigue and preventing osteoporosis.   Patient prefers topicals will be covered and interested in starting today.  I discussed with would titrate and repeat check in 3 months along with full labs.  Restart atenolol but at lower dose;  Continue lisinopril as Rx       -Follow up: 3 months       Current Outpatient Prescriptions:   •  ACCU-CHEK SOFTCLIX LANCETS lancets, Use as instructed to test glucose once daily. Diagnosis E11.9, Disp: 100 each, Rfl: 3  •  allopurinol (ZYLOPRIM) 300 MG tablet, TAKE ONE TABLET BY MOUTH TWICE A DAY, Disp: 180  tablet, Rfl: 1  •  atenolol (TENORMIN) 25 MG tablet, 1 po twice daily x 7 days, then 1/2 po twice daily then stop, Disp: 21 tablet, Rfl: 0  •  atorvastatin (LIPITOR) 20 MG tablet, TAKE ONE TABLET BY MOUTH EVERY NIGHT, Disp: 30 tablet, Rfl: 4  •  Blood Glucose Monitoring Suppl (ACCU-CHEK TYREL PLUS) w/Device kit, , Disp: , Rfl:   •  buPROPion XL (WELLBUTRIN XL) 150 MG 24 hr tablet, Take 1 tablet by mouth Daily., Disp: 30 tablet, Rfl: 5  •  cetirizine (zyrTEC) 10 MG tablet, TAKE ONE TABLET BY MOUTH DAILY, Disp: 90 tablet, Rfl: 0  •  FARXIGA 10 MG tablet, TAKE ONE TABLET BY MOUTH DAILY, Disp: 30 tablet, Rfl: 11  •  fluticasone (FLONASE) 50 MCG/ACT nasal spray, PLACE 2 SPRAYS IN EACH NOSTRIL DAILY, Disp: 16 g, Rfl: 0  •  furosemide (LASIX) 40 MG tablet, TAKE ONE TABLET BY MOUTH DAILY, Disp: 90 tablet, Rfl: 1  •  gabapentin (NEURONTIN) 400 MG capsule, Take 1 capsule by mouth 3 (Three) Times a Day., Disp: 90 capsule, Rfl: 5  •  glucose blood (ACCU-CHEK TYREL PLUS) test strip, Use as instructed to test glucose once daily. Diagnosis E11.9, Disp: 100 each, Rfl: 3  •  hydrocortisone 2.5 % cream, Apply  topically 2 (two) times a day. Wipe bottom twice daily, Disp: 28 g, Rfl: 0  •  ibuprofen (ADVIL,MOTRIN) 800 MG tablet, Take 1 tablet by mouth Every 8 (Eight) Hours As Needed for Mild Pain  or Moderate Pain ., Disp: 90 tablet, Rfl: 3  •  lisinopril (PRINIVIL,ZESTRIL) 40 MG tablet, Take 1 tablet by mouth Daily., Disp: 90 tablet, Rfl: 1  •  magnesium oxide (MAGOX) 400 (241.3 Mg) MG tablet tablet, TAKE ONE TABLET BY MOUTH DAILY, Disp: 90 tablet, Rfl: 0  •  metFORMIN (GLUCOPHAGE) 1000 MG tablet, TAKE ONE TABLET BY MOUTH EVERY MORNING WITH BREAKFAST, Disp: 30 tablet, Rfl: 10  •  potassium chloride (K-DUR,KLOR-CON) 10 MEQ CR tablet, TAKE THREE TABLETS BY MOUTH TWICE A DAY, Disp: 180 tablet, Rfl: 1  •  spironolactone (ALDACTONE) 25 MG tablet, Take 1 tablet by mouth Daily., Disp: 30 tablet, Rfl: 11  •  triamcinolone (KENALOG) 0.1 %  cream, APPLY A THIN LAYER TO AFFECTED AREA(S) TWO TIMES A DAY, Disp: 30 g, Rfl: 1  •  TRULICITY 0.75 MG/0.5ML solution pen-injector, INJECT ONE SYRINGE (0.75MG) UNDER THE SKIN ONCE WEEKLY, Disp: 2 pen, Rfl: 4

## 2018-02-20 ENCOUNTER — TELEPHONE (OUTPATIENT)
Dept: FAMILY MEDICINE CLINIC | Facility: CLINIC | Age: 48
End: 2018-02-20

## 2018-02-26 ENCOUNTER — TELEPHONE (OUTPATIENT)
Dept: FAMILY MEDICINE CLINIC | Facility: CLINIC | Age: 48
End: 2018-02-26

## 2018-02-28 ENCOUNTER — TELEPHONE (OUTPATIENT)
Dept: FAMILY MEDICINE CLINIC | Facility: CLINIC | Age: 48
End: 2018-02-28

## 2018-02-28 DIAGNOSIS — E11.42 TYPE 2 DIABETES MELLITUS WITH DIABETIC POLYNEUROPATHY, WITHOUT LONG-TERM CURRENT USE OF INSULIN (HCC): ICD-10-CM

## 2018-02-28 DIAGNOSIS — E78.1 HYPERTRIGLYCERIDEMIA: ICD-10-CM

## 2018-02-28 DIAGNOSIS — F32.2 SEVERE SINGLE CURRENT EPISODE OF MAJOR DEPRESSIVE DISORDER, WITHOUT PSYCHOTIC FEATURES (HCC): ICD-10-CM

## 2018-02-28 DIAGNOSIS — I10 BENIGN ESSENTIAL HYPERTENSION: ICD-10-CM

## 2018-02-28 RX ORDER — ATENOLOL 25 MG/1
25 TABLET ORAL DAILY
Qty: 30 TABLET | Refills: 1 | Status: SHIPPED | OUTPATIENT
Start: 2018-02-28 | End: 2018-04-12 | Stop reason: SDUPTHER

## 2018-02-28 RX ORDER — BUPROPION HYDROCHLORIDE 150 MG/1
150 TABLET ORAL DAILY
Qty: 30 TABLET | Refills: 1 | Status: SHIPPED | OUTPATIENT
Start: 2018-02-28 | End: 2018-09-06 | Stop reason: SDUPTHER

## 2018-02-28 RX ORDER — ATORVASTATIN CALCIUM 20 MG/1
20 TABLET, FILM COATED ORAL NIGHTLY
Qty: 30 TABLET | Refills: 1 | Status: SHIPPED | OUTPATIENT
Start: 2018-02-28 | End: 2018-08-06 | Stop reason: SDUPTHER

## 2018-02-28 RX ORDER — ALLOPURINOL 300 MG/1
300 TABLET ORAL 2 TIMES DAILY
Qty: 60 TABLET | Refills: 1 | Status: SHIPPED | OUTPATIENT
Start: 2018-02-28 | End: 2018-04-12 | Stop reason: SDUPTHER

## 2018-02-28 NOTE — TELEPHONE ENCOUNTER
Please let me know if this is being taken care of....this is another facility saying that Dr Sawrtz is not current with medicaid.

## 2018-03-02 DIAGNOSIS — E29.1 HYPOGONADISM IN MALE: Primary | ICD-10-CM

## 2018-03-02 RX ORDER — TESTOSTERONE 12.5 MG/1.25G
50 GEL TOPICAL DAILY
Qty: 1 EACH | Refills: 5 | Status: SHIPPED | OUTPATIENT
Start: 2018-03-02 | End: 2018-11-19

## 2018-03-15 RX ORDER — TRIAMCINOLONE ACETONIDE 1 MG/G
CREAM TOPICAL
Qty: 30 G | Refills: 1 | Status: SHIPPED | OUTPATIENT
Start: 2018-03-15 | End: 2018-07-10 | Stop reason: SDUPTHER

## 2018-03-15 RX ORDER — LANCETS
EACH MISCELLANEOUS
Qty: 100 EACH | Refills: 2 | Status: SHIPPED | OUTPATIENT
Start: 2018-03-15 | End: 2018-03-16 | Stop reason: CLARIF

## 2018-03-16 RX ORDER — LANCETS 30 GAUGE
1 EACH MISCELLANEOUS DAILY
Qty: 100 EACH | Refills: 12 | Status: SHIPPED | OUTPATIENT
Start: 2018-03-16

## 2018-03-28 ENCOUNTER — OFFICE VISIT (OUTPATIENT)
Dept: FAMILY MEDICINE CLINIC | Facility: CLINIC | Age: 48
End: 2018-03-28

## 2018-03-28 VITALS
TEMPERATURE: 98.3 F | BODY MASS INDEX: 38.36 KG/M2 | HEIGHT: 76 IN | DIASTOLIC BLOOD PRESSURE: 64 MMHG | HEART RATE: 79 BPM | OXYGEN SATURATION: 98 % | SYSTOLIC BLOOD PRESSURE: 142 MMHG | WEIGHT: 315 LBS

## 2018-03-28 DIAGNOSIS — M25.511 ACUTE PAIN OF RIGHT SHOULDER: ICD-10-CM

## 2018-03-28 DIAGNOSIS — J06.9 UPPER RESPIRATORY TRACT INFECTION, UNSPECIFIED TYPE: ICD-10-CM

## 2018-03-28 DIAGNOSIS — S43.401A SPRAIN OF RIGHT SHOULDER, UNSPECIFIED SHOULDER SPRAIN TYPE, INITIAL ENCOUNTER: Primary | ICD-10-CM

## 2018-03-28 DIAGNOSIS — R05.9 COUGH: ICD-10-CM

## 2018-03-28 PROCEDURE — 73030 X-RAY EXAM OF SHOULDER: CPT | Performed by: NURSE PRACTITIONER

## 2018-03-28 PROCEDURE — 99213 OFFICE O/P EST LOW 20 MIN: CPT | Performed by: NURSE PRACTITIONER

## 2018-03-28 RX ORDER — BENZONATATE 100 MG/1
100 CAPSULE ORAL 3 TIMES DAILY PRN
Qty: 45 CAPSULE | Refills: 0 | Status: SHIPPED | OUTPATIENT
Start: 2018-03-28 | End: 2018-04-12 | Stop reason: SDUPTHER

## 2018-03-28 NOTE — PROGRESS NOTES
Subjective   Marcial Desai is a 48 y.o. male. He fell yesterday and landed on his R side and he has a lot of pain in his R shoulder. Was at home, walking out of the house, right leg gave out and he fell landing on the right side, hip,  shoulder pain. Also with tenderness in the right elbow. He took his normal Ibuprofen but did nothing else for the shoulder. He has hx of falls with the right leg giving out on him.   Advised he is having vascular surgery in April for the right leg. [facility]   Also complaining of sinus drainage. Mostly yellow and clear which started 3 days ago. He has been taking his Cetirizine and Flonase. Still with lots of drainage especially at night when he lays down. Denies fever, chills, facial pressure, sneezing. Mostly just the cough and clear drainage.    Shoulder Injury    The incident occurred at home. The right shoulder is affected. The incident occurred 12 to 24 hours ago. The injury mechanism was a fall. The quality of the pain is described as aching. The pain is at a severity of 5/10. The pain is moderate. The symptoms are aggravated by overhead lifting and movement. He has tried NSAIDs for the symptoms. The treatment provided no relief.   URI    This is a new problem. The current episode started in the past 7 days. The problem has been unchanged. There has been no fever. Associated symptoms include congestion, coughing and rhinorrhea. Pertinent negatives include no ear pain, headaches, nausea, plugged ear sensation, sinus pain, sneezing or sore throat. Treatments tried: Cetirizine and Flonase. The treatment provided no relief.        The following portions of the patient's history were reviewed and updated as appropriate: allergies, current medications, past family history, past medical history, past social history, past surgical history and problem list.    Review of Systems   Constitutional: Negative.    HENT: Positive for congestion and  "rhinorrhea. Negative for ear pain, sneezing and sore throat.    Respiratory: Positive for cough.    Cardiovascular: Negative.    Gastrointestinal: Negative for nausea.   Musculoskeletal:        Pain right shoulder     Neurological: Negative for headaches.       Objective   Physical Exam   Constitutional: He appears well-developed and well-nourished.   Cardiovascular: Normal rate, regular rhythm and normal heart sounds.    Pulmonary/Chest: Effort normal and breath sounds normal.   Musculoskeletal:        Right shoulder: He exhibits tenderness and pain. He exhibits no deformity, normal pulse and normal strength.        Arms:  Nursing note and vitals reviewed.    Vitals:    03/28/18 0818   BP: 142/64   Pulse: 79   Temp: 98.3 °F (36.8 °C)   TempSrc: Oral   SpO2: 98%   Weight: (!) 176 kg (387 lb)   Height: 193 cm (75.98\")     Xray of right shoulder ordered for pain from fall and reviewed by me as nothing acute. No previous films available for comparison. Will await radiology opinion.       Assessment/Plan   Diagnoses and all orders for this visit:    Sprain of right shoulder, unspecified shoulder sprain type, initial encounter    Acute pain of right shoulder  -     XR Shoulder 2+ View Right (In Office)    Upper respiratory tract infection, unspecified type    Cough  -     benzonatate (TESSALON PERLES) 100 MG capsule; Take 1 capsule by mouth 3 (Three) Times a Day As Needed for Cough.    Shoulder pain is likely strain from fall. Continue NSAID's and use ice to shoulder, elbow and hand several times a day. If not improving return.     URI: Continue allergy meds. Take Coricidin HBP for drainage. Tessalon perles for cough  RTC if not improved           "

## 2018-04-04 ENCOUNTER — TELEPHONE (OUTPATIENT)
Dept: FAMILY MEDICINE CLINIC | Facility: CLINIC | Age: 48
End: 2018-04-04

## 2018-04-04 NOTE — TELEPHONE ENCOUNTER
Hold the lisinopril today and tomorrow and if BP is over 110, restart it on Fri at 20 mg daily. Check blood pressures and call back.

## 2018-04-12 DIAGNOSIS — I10 BENIGN ESSENTIAL HYPERTENSION: ICD-10-CM

## 2018-04-12 DIAGNOSIS — R05.9 COUGH: ICD-10-CM

## 2018-04-12 DIAGNOSIS — J00 ACUTE NASOPHARYNGITIS: ICD-10-CM

## 2018-04-12 RX ORDER — BENZONATATE 100 MG/1
CAPSULE ORAL
Qty: 45 CAPSULE | Refills: 0 | Status: SHIPPED | OUTPATIENT
Start: 2018-04-12 | End: 2018-04-12 | Stop reason: SDUPTHER

## 2018-04-12 RX ORDER — POTASSIUM CHLORIDE 750 MG/1
TABLET, EXTENDED RELEASE ORAL
Qty: 150 TABLET | Refills: 0 | Status: SHIPPED | OUTPATIENT
Start: 2018-04-12 | End: 2018-05-09 | Stop reason: SDUPTHER

## 2018-04-12 RX ORDER — FUROSEMIDE 40 MG/1
TABLET ORAL
Qty: 17 TABLET | Refills: 0 | Status: SHIPPED | OUTPATIENT
Start: 2018-04-12 | End: 2018-05-09 | Stop reason: SDUPTHER

## 2018-04-12 RX ORDER — GABAPENTIN 300 MG/1
CAPSULE ORAL
Qty: 90 CAPSULE | Refills: 4 | OUTPATIENT
Start: 2018-04-12

## 2018-04-12 RX ORDER — ALLOPURINOL 300 MG/1
TABLET ORAL
Qty: 54 TABLET | Refills: 0 | Status: SHIPPED | OUTPATIENT
Start: 2018-04-12 | End: 2018-12-06

## 2018-04-12 RX ORDER — CETIRIZINE HYDROCHLORIDE 10 MG/1
TABLET ORAL
Qty: 90 TABLET | Refills: 3 | Status: SHIPPED | OUTPATIENT
Start: 2018-04-12 | End: 2019-05-07 | Stop reason: SDUPTHER

## 2018-04-12 RX ORDER — BENZONATATE 100 MG/1
CAPSULE ORAL
Qty: 45 CAPSULE | Refills: 0 | OUTPATIENT
Start: 2018-04-12

## 2018-04-12 RX ORDER — ATENOLOL 25 MG/1
25 TABLET ORAL DAILY
Qty: 90 TABLET | Refills: 1 | Status: SHIPPED | OUTPATIENT
Start: 2018-04-12 | End: 2018-08-17

## 2018-05-09 DIAGNOSIS — M1A.09X0 IDIOPATHIC CHRONIC GOUT OF MULTIPLE SITES WITHOUT TOPHUS: ICD-10-CM

## 2018-05-09 RX ORDER — FUROSEMIDE 40 MG/1
TABLET ORAL
Qty: 30 TABLET | Refills: 0 | Status: SHIPPED | OUTPATIENT
Start: 2018-05-09 | End: 2018-06-09 | Stop reason: SDUPTHER

## 2018-05-09 RX ORDER — IBUPROFEN 800 MG/1
TABLET ORAL
Qty: 90 TABLET | Refills: 1 | Status: SHIPPED | OUTPATIENT
Start: 2018-05-09 | End: 2018-07-10 | Stop reason: SDUPTHER

## 2018-05-09 RX ORDER — POTASSIUM CHLORIDE 750 MG/1
TABLET, EXTENDED RELEASE ORAL
Qty: 150 TABLET | Refills: 1 | Status: SHIPPED | OUTPATIENT
Start: 2018-05-09 | End: 2018-06-09 | Stop reason: SDUPTHER

## 2018-05-18 ENCOUNTER — OFFICE VISIT (OUTPATIENT)
Dept: FAMILY MEDICINE CLINIC | Facility: CLINIC | Age: 48
End: 2018-05-18

## 2018-05-18 VITALS
TEMPERATURE: 97.2 F | HEIGHT: 76 IN | SYSTOLIC BLOOD PRESSURE: 128 MMHG | DIASTOLIC BLOOD PRESSURE: 78 MMHG | HEART RATE: 77 BPM | BODY MASS INDEX: 38.36 KG/M2 | OXYGEN SATURATION: 97 % | WEIGHT: 315 LBS

## 2018-05-18 DIAGNOSIS — E29.1 HYPOGONADISM IN MALE: ICD-10-CM

## 2018-05-18 DIAGNOSIS — Z23 IMMUNIZATION DUE: ICD-10-CM

## 2018-05-18 DIAGNOSIS — I10 BENIGN ESSENTIAL HYPERTENSION: Primary | ICD-10-CM

## 2018-05-18 PROCEDURE — 90471 IMMUNIZATION ADMIN: CPT | Performed by: FAMILY MEDICINE

## 2018-05-18 PROCEDURE — 90632 HEPA VACCINE ADULT IM: CPT | Performed by: FAMILY MEDICINE

## 2018-05-18 PROCEDURE — 99214 OFFICE O/P EST MOD 30 MIN: CPT | Performed by: FAMILY MEDICINE

## 2018-05-18 PROCEDURE — 96372 THER/PROPH/DIAG INJ SC/IM: CPT | Performed by: FAMILY MEDICINE

## 2018-05-18 RX ORDER — TESTOSTERONE CYPIONATE 200 MG/ML
100 INJECTION, SOLUTION INTRAMUSCULAR
Status: SHIPPED | OUTPATIENT
Start: 2018-05-18

## 2018-05-18 RX ORDER — LISINOPRIL 30 MG/1
30 TABLET ORAL DAILY
Qty: 30 TABLET | Refills: 5 | Status: SHIPPED | OUTPATIENT
Start: 2018-05-18 | End: 2018-08-23 | Stop reason: DRUGHIGH

## 2018-05-18 RX ADMIN — TESTOSTERONE CYPIONATE 100 MG: 200 INJECTION, SOLUTION INTRAMUSCULAR at 09:54

## 2018-05-18 NOTE — PROGRESS NOTES
Subjective   Marcial Desai is a 48 y.o. male. Presents today for   Chief Complaint   Patient presents with   • Hypertension     pt here for follow up appt for his medication changes.       Hypertension   This is a chronic problem. The current episode started more than 1 year ago. The problem has been waxing and waning since onset. The problem is controlled. Associated symptoms include peripheral edema. Pertinent negatives include no chest pain, orthopnea, palpitations, PND or shortness of breath. (Patient reports lightheaded and dizzy when stands up, has fallen several times as a result.  BP dropping really low since increased lisinopril dose.) There are no associated agents to hypertension. Risk factors for coronary artery disease include dyslipidemia, male gender, obesity and diabetes mellitus. Past treatments include ACE inhibitors and beta blockers. Current antihypertension treatment includes ACE inhibitors and beta blockers. The current treatment provides significant improvement. There are no compliance problems.  There is no history of kidney disease. There is no history of chronic renal disease.   HYPOGONADISM  Hypogonadism dx with 2 levels <299.  Patient chronic fatigue, low libido.  Insurance denied topical, unfortunately.       To have vein stripping done.    Review of Systems   Respiratory: Negative for shortness of breath.    Cardiovascular: Negative for chest pain, palpitations, orthopnea and PND.       The following portions of the patient's history were reviewed and updated as appropriate: allergies, current medications, past medical history and problem list.    Patient Active Problem List   Diagnosis   • Ankle pain   • Atopic dermatitis   • Benign essential hypertension   • Type 2 diabetes mellitus with diabetic polyneuropathy, without long-term current use of insulin   • Diabetic peripheral neuropathy   • Abnormal liver function tests   • Idiopathic chronic gout of multiple sites without tophus   •  Hypertriglyceridemia   • Cramps of lower extremity   • Edema of lower extremity   • Lymphedema   • Osteoarthritis       No Known Allergies    Current Outpatient Prescriptions on File Prior to Visit   Medication Sig Dispense Refill   • allopurinol (ZYLOPRIM) 300 MG tablet TAKE ONE TABLET BY MOUTH TWICE A DAY 54 tablet 0   • atenolol (TENORMIN) 25 MG tablet Take 1 tablet by mouth Daily. 1 po daily 90 tablet 1   • atorvastatin (LIPITOR) 20 MG tablet Take 1 tablet by mouth Every Night. 30 tablet 1   • Blood Glucose Monitoring Suppl (ACCU-CHEK TYREL PLUS) w/Device kit      • buPROPion XL (WELLBUTRIN XL) 150 MG 24 hr tablet Take 1 tablet by mouth Daily. 30 tablet 1   • cetirizine (zyrTEC) 10 MG tablet TAKE ONE TABLET BY MOUTH DAILY 90 tablet 3   • Dapagliflozin Propanediol (FARXIGA) 10 MG tablet Take 1 tablet by mouth Daily. 30 tablet 1   • fluticasone (FLONASE) 50 MCG/ACT nasal spray PLACE 2 SPRAYS IN EACH NOSTRIL DAILY 16 g 0   • furosemide (LASIX) 40 MG tablet TAKE ONE TABLET BY MOUTH DAILY 30 tablet 0   • gabapentin (NEURONTIN) 400 MG capsule Take 1 capsule by mouth 3 (Three) Times a Day. 90 capsule 5   • glucose blood (ACCU-CHEK TYREL PLUS) test strip Use as instructed to test glucose once daily. Diagnosis E11.9 100 each 3   • hydrocortisone 2.5 % cream Apply  topically 2 (two) times a day. Wipe bottom twice daily 28 g 0   •  MG tablet TAKE ONE TABLET BY MOUTH EVERY 8 HOURS AS NEEDED FOR MILD OR MODERATE PAIN 90 tablet 1   • Lancets misc 1 Device Daily. Dispense brand covered by insurance. Dx. e11.9 100 each 12   • magnesium oxide (MAGOX) 400 (241.3 Mg) MG tablet tablet TAKE ONE TABLET BY MOUTH DAILY 13 tablet 0   • metFORMIN (GLUCOPHAGE) 1000 MG tablet TAKE ONE TABLET BY MOUTH EVERY MORNING WITH BREAKFAST 30 tablet 10   • potassium chloride (K-DUR,KLOR-CON) 10 MEQ CR tablet TAKE THREE TABLETS BY MOUTH TWICE A  tablet 1   • spironolactone (ALDACTONE) 25 MG tablet Take 1 tablet by mouth Daily. 30 tablet  "11   • testosterone (ANDROGEL) 25 MG/2.5GM (1%) gel gel Place 50 mg on the skin Daily. 1 each 5   • Testosterone 20.25 MG/1.25GM (1.62%) gel 1 pump each armpit daily 1.25 g 5   • triamcinolone (KENALOG) 0.1 % cream APPLY A THIN LAYER TO AFFECTED AREA(S) TWO TIMES A DAY 30 g 1   • TRULICITY 0.75 MG/0.5ML solution pen-injector INJECT ONE SYRINGE (0.75MG) UNDER THE SKIN ONCE WEEKLY 2 pen 4     No current facility-administered medications on file prior to visit.        Objective   Vitals:    05/18/18 0905   BP: 128/78   BP Location: Left arm   Patient Position: Sitting   Cuff Size: Large Adult   Pulse: 77   Temp: 97.2 °F (36.2 °C)   TempSrc: Oral   SpO2: 97%   Weight: (!) 172 kg (380 lb)   Height: 193 cm (75.98\")       Physical Exam   Constitutional: He appears well-developed and well-nourished.   HENT:   Head: Normocephalic and atraumatic.   Neck: Neck supple. No JVD present. No thyromegaly present.   Cardiovascular: Normal rate, regular rhythm and normal heart sounds.  Exam reveals no gallop and no friction rub.    No murmur heard.  Pulmonary/Chest: Effort normal and breath sounds normal. No respiratory distress. He has no wheezes. He has no rales.   Abdominal: Soft. Bowel sounds are normal. He exhibits no distension. There is no tenderness. There is no rebound and no guarding.   Musculoskeletal: He exhibits edema.   Neurological: He is alert.   Skin: Skin is warm and dry.   Psychiatric: He has a normal mood and affect. His behavior is normal.   Nursing note and vitals reviewed.      Assessment/Plan   Marcial was seen today for hypertension.    Diagnoses and all orders for this visit:    Benign essential hypertension  -     lisinopril (PRINIVIL,ZESTRIL) 30 MG tablet; Take 1 tablet by mouth Daily.    Hypogonadism in male  -     Testosterone Cypionate (DEPOTESTOTERONE CYPIONATE) injection 100 mg; Inject 0.5 mL into the shoulder, thigh, or buttocks Every 14 (Fourteen) Days.    Immunization due  -     Hepatitis A Vaccine " Adult IM    -bp over treated, will cut lisinopril down from 40mg to 30mg  -I explained at length to patient treatment options, risks of replacement that can include acne, mood changes, ? Cardiovascular disease, polycythemia and liver dysfunction.  I also warned about concerns regarding prostate and breast cancer.   I discussed with potential for improvement in fatigue and preventing osteoporosis.   I discussed will titrate and repeat check in 3 months along with full labs.  -hep a today and repeat in 6 months           -Follow up: 3 months       Current Outpatient Prescriptions:   •  allopurinol (ZYLOPRIM) 300 MG tablet, TAKE ONE TABLET BY MOUTH TWICE A DAY, Disp: 54 tablet, Rfl: 0  •  atenolol (TENORMIN) 25 MG tablet, Take 1 tablet by mouth Daily. 1 po daily, Disp: 90 tablet, Rfl: 1  •  atorvastatin (LIPITOR) 20 MG tablet, Take 1 tablet by mouth Every Night., Disp: 30 tablet, Rfl: 1  •  Blood Glucose Monitoring Suppl (ACCU-CHEK TYREL PLUS) w/Device kit, , Disp: , Rfl:   •  buPROPion XL (WELLBUTRIN XL) 150 MG 24 hr tablet, Take 1 tablet by mouth Daily., Disp: 30 tablet, Rfl: 1  •  cetirizine (zyrTEC) 10 MG tablet, TAKE ONE TABLET BY MOUTH DAILY, Disp: 90 tablet, Rfl: 3  •  Dapagliflozin Propanediol (FARXIGA) 10 MG tablet, Take 1 tablet by mouth Daily., Disp: 30 tablet, Rfl: 1  •  fluticasone (FLONASE) 50 MCG/ACT nasal spray, PLACE 2 SPRAYS IN EACH NOSTRIL DAILY, Disp: 16 g, Rfl: 0  •  furosemide (LASIX) 40 MG tablet, TAKE ONE TABLET BY MOUTH DAILY, Disp: 30 tablet, Rfl: 0  •  gabapentin (NEURONTIN) 400 MG capsule, Take 1 capsule by mouth 3 (Three) Times a Day., Disp: 90 capsule, Rfl: 5  •  glucose blood (ACCU-CHEK TYREL PLUS) test strip, Use as instructed to test glucose once daily. Diagnosis E11.9, Disp: 100 each, Rfl: 3  •  hydrocortisone 2.5 % cream, Apply  topically 2 (two) times a day. Wipe bottom twice daily, Disp: 28 g, Rfl: 0  •   MG tablet, TAKE ONE TABLET BY MOUTH EVERY 8 HOURS AS NEEDED FOR MILD OR  MODERATE PAIN, Disp: 90 tablet, Rfl: 1  •  Lancets misc, 1 Device Daily. Dispense brand covered by insurance. Dx. e11.9, Disp: 100 each, Rfl: 12  •  lisinopril (PRINIVIL,ZESTRIL) 30 MG tablet, Take 1 tablet by mouth Daily., Disp: 30 tablet, Rfl: 5  •  magnesium oxide (MAGOX) 400 (241.3 Mg) MG tablet tablet, TAKE ONE TABLET BY MOUTH DAILY, Disp: 13 tablet, Rfl: 0  •  metFORMIN (GLUCOPHAGE) 1000 MG tablet, TAKE ONE TABLET BY MOUTH EVERY MORNING WITH BREAKFAST, Disp: 30 tablet, Rfl: 10  •  potassium chloride (K-DUR,KLOR-CON) 10 MEQ CR tablet, TAKE THREE TABLETS BY MOUTH TWICE A DAY, Disp: 150 tablet, Rfl: 1  •  spironolactone (ALDACTONE) 25 MG tablet, Take 1 tablet by mouth Daily., Disp: 30 tablet, Rfl: 11  •  testosterone (ANDROGEL) 25 MG/2.5GM (1%) gel gel, Place 50 mg on the skin Daily., Disp: 1 each, Rfl: 5  •  Testosterone 20.25 MG/1.25GM (1.62%) gel, 1 pump each armpit daily, Disp: 1.25 g, Rfl: 5  •  triamcinolone (KENALOG) 0.1 % cream, APPLY A THIN LAYER TO AFFECTED AREA(S) TWO TIMES A DAY, Disp: 30 g, Rfl: 1  •  TRULICITY 0.75 MG/0.5ML solution pen-injector, INJECT ONE SYRINGE (0.75MG) UNDER THE SKIN ONCE WEEKLY, Disp: 2 pen, Rfl: 4    Current Facility-Administered Medications:   •  Testosterone Cypionate (DEPOTESTOTERONE CYPIONATE) injection 100 mg, 100 mg, Intramuscular, Q14 Days, Tesfaye Swartz DO, 100 mg at 05/18/18 0902

## 2018-05-24 ENCOUNTER — TELEPHONE (OUTPATIENT)
Dept: FAMILY MEDICINE CLINIC | Facility: CLINIC | Age: 48
End: 2018-05-24

## 2018-05-24 RX ORDER — DOXYCYCLINE HYCLATE 100 MG/1
100 CAPSULE ORAL 2 TIMES DAILY
Qty: 14 CAPSULE | Refills: 0 | Status: SHIPPED | OUTPATIENT
Start: 2018-05-24 | End: 2018-05-31

## 2018-05-24 NOTE — TELEPHONE ENCOUNTER
Sounds like becoming infected, doxycycline 100mg 1 po bid x 7 days.  bactroban 2% ointment apply twice daily to area of bite. Disp 22gm no ref

## 2018-05-25 RX ORDER — DOXYCYCLINE 100 MG/1
100 CAPSULE ORAL EVERY 12 HOURS SCHEDULED
Qty: 14 CAPSULE | Refills: 0 | Status: SHIPPED | OUTPATIENT
Start: 2018-05-25 | End: 2018-06-01

## 2018-06-01 ENCOUNTER — CLINICAL SUPPORT (OUTPATIENT)
Dept: FAMILY MEDICINE CLINIC | Facility: CLINIC | Age: 48
End: 2018-06-01

## 2018-06-01 DIAGNOSIS — E29.1 HYPOGONADISM MALE: ICD-10-CM

## 2018-06-01 PROCEDURE — 96372 THER/PROPH/DIAG INJ SC/IM: CPT | Performed by: FAMILY MEDICINE

## 2018-06-01 RX ADMIN — TESTOSTERONE CYPIONATE 100 MG: 200 INJECTION, SOLUTION INTRAMUSCULAR at 12:40

## 2018-06-11 RX ORDER — FUROSEMIDE 40 MG/1
TABLET ORAL
Qty: 90 TABLET | Refills: 0 | Status: SHIPPED | OUTPATIENT
Start: 2018-06-11 | End: 2018-09-06 | Stop reason: SDUPTHER

## 2018-06-11 RX ORDER — POTASSIUM CHLORIDE 750 MG/1
TABLET, EXTENDED RELEASE ORAL
Qty: 180 TABLET | Refills: 0 | Status: SHIPPED | OUTPATIENT
Start: 2018-06-11 | End: 2018-07-10 | Stop reason: SDUPTHER

## 2018-06-11 RX ORDER — BLOOD SUGAR DIAGNOSTIC
STRIP MISCELLANEOUS
Qty: 50 EACH | Refills: 2 | Status: SHIPPED | OUTPATIENT
Start: 2018-06-11

## 2018-06-15 ENCOUNTER — CLINICAL SUPPORT (OUTPATIENT)
Dept: FAMILY MEDICINE CLINIC | Facility: CLINIC | Age: 48
End: 2018-06-15

## 2018-06-15 DIAGNOSIS — E66.01 MORBID OBESITY DUE TO EXCESS CALORIES (HCC): ICD-10-CM

## 2018-06-15 DIAGNOSIS — E11.42 TYPE 2 DIABETES MELLITUS WITH DIABETIC POLYNEUROPATHY, WITHOUT LONG-TERM CURRENT USE OF INSULIN (HCC): ICD-10-CM

## 2018-06-15 DIAGNOSIS — E29.1 HYPOGONADISM IN MALE: ICD-10-CM

## 2018-06-15 PROCEDURE — 96372 THER/PROPH/DIAG INJ SC/IM: CPT | Performed by: FAMILY MEDICINE

## 2018-06-15 RX ORDER — DULAGLUTIDE 0.75 MG/.5ML
INJECTION, SOLUTION SUBCUTANEOUS
Qty: 2 ML | Refills: 3 | Status: SHIPPED | OUTPATIENT
Start: 2018-06-15 | End: 2018-08-29 | Stop reason: SDUPTHER

## 2018-06-15 RX ADMIN — TESTOSTERONE CYPIONATE 100 MG: 200 INJECTION, SOLUTION INTRAMUSCULAR at 09:01

## 2018-07-10 DIAGNOSIS — M1A.09X0 IDIOPATHIC CHRONIC GOUT OF MULTIPLE SITES WITHOUT TOPHUS: ICD-10-CM

## 2018-07-10 RX ORDER — POTASSIUM CHLORIDE 750 MG/1
TABLET, EXTENDED RELEASE ORAL
Qty: 180 TABLET | Refills: 0 | Status: SHIPPED | OUTPATIENT
Start: 2018-07-10 | End: 2018-08-06 | Stop reason: SDUPTHER

## 2018-07-10 RX ORDER — IBUPROFEN 800 MG/1
TABLET ORAL
Qty: 90 TABLET | Refills: 0 | Status: SHIPPED | OUTPATIENT
Start: 2018-07-10 | End: 2018-08-06 | Stop reason: SDUPTHER

## 2018-07-10 RX ORDER — TRIAMCINOLONE ACETONIDE 1 MG/G
CREAM TOPICAL
Qty: 30 G | Refills: 2 | Status: SHIPPED | OUTPATIENT
Start: 2018-07-10 | End: 2021-09-14 | Stop reason: SDUPTHER

## 2018-07-13 ENCOUNTER — CLINICAL SUPPORT (OUTPATIENT)
Dept: FAMILY MEDICINE CLINIC | Facility: CLINIC | Age: 48
End: 2018-07-13

## 2018-07-13 ENCOUNTER — TELEPHONE (OUTPATIENT)
Dept: FAMILY MEDICINE CLINIC | Facility: CLINIC | Age: 48
End: 2018-07-13

## 2018-07-13 DIAGNOSIS — E29.1 HYPOGONADISM IN MALE: ICD-10-CM

## 2018-07-13 PROCEDURE — 96372 THER/PROPH/DIAG INJ SC/IM: CPT | Performed by: FAMILY MEDICINE

## 2018-07-13 RX ADMIN — TESTOSTERONE CYPIONATE 100 MG: 200 INJECTION, SOLUTION INTRAMUSCULAR at 08:46

## 2018-07-13 NOTE — TELEPHONE ENCOUNTER
I was going to call and tell him to come in for face to face eval, but I thought I should ask your opinion on whether or not this would even be covered by [employer]

## 2018-07-13 NOTE — TELEPHONE ENCOUNTER
Patient states his lift chair that is > 7 years old has broken and will cost too much to fix. He now has passport and wants to see about getting a new one.

## 2018-07-27 ENCOUNTER — CLINICAL SUPPORT (OUTPATIENT)
Dept: FAMILY MEDICINE CLINIC | Facility: CLINIC | Age: 48
End: 2018-07-27

## 2018-07-27 DIAGNOSIS — E29.1 HYPOGONADISM IN MALE: ICD-10-CM

## 2018-07-27 PROCEDURE — 96372 THER/PROPH/DIAG INJ SC/IM: CPT | Performed by: FAMILY MEDICINE

## 2018-07-27 RX ADMIN — TESTOSTERONE CYPIONATE 100 MG: 200 INJECTION, SOLUTION INTRAMUSCULAR at 10:08

## 2018-08-06 DIAGNOSIS — M1A.09X0 IDIOPATHIC CHRONIC GOUT OF MULTIPLE SITES WITHOUT TOPHUS: ICD-10-CM

## 2018-08-06 DIAGNOSIS — E11.42 TYPE 2 DIABETES MELLITUS WITH DIABETIC POLYNEUROPATHY, WITHOUT LONG-TERM CURRENT USE OF INSULIN (HCC): ICD-10-CM

## 2018-08-06 DIAGNOSIS — E78.1 HYPERTRIGLYCERIDEMIA: ICD-10-CM

## 2018-08-06 RX ORDER — POTASSIUM CHLORIDE 750 MG/1
TABLET, EXTENDED RELEASE ORAL
Qty: 180 TABLET | Refills: 0 | Status: SHIPPED | OUTPATIENT
Start: 2018-08-06 | End: 2018-10-04 | Stop reason: SDUPTHER

## 2018-08-06 RX ORDER — IBUPROFEN 800 MG/1
TABLET ORAL
Qty: 90 TABLET | Refills: 0 | Status: SHIPPED | OUTPATIENT
Start: 2018-08-06 | End: 2018-09-06 | Stop reason: SDUPTHER

## 2018-08-06 RX ORDER — ATORVASTATIN CALCIUM 20 MG/1
TABLET, FILM COATED ORAL
Qty: 30 TABLET | Refills: 3 | Status: SHIPPED | OUTPATIENT
Start: 2018-08-06 | End: 2018-12-06 | Stop reason: SDUPTHER

## 2018-08-10 ENCOUNTER — CLINICAL SUPPORT (OUTPATIENT)
Dept: FAMILY MEDICINE CLINIC | Facility: CLINIC | Age: 48
End: 2018-08-10

## 2018-08-10 DIAGNOSIS — E29.1 HYPOGONADISM IN MALE: ICD-10-CM

## 2018-08-10 PROCEDURE — 96372 THER/PROPH/DIAG INJ SC/IM: CPT | Performed by: FAMILY MEDICINE

## 2018-08-10 RX ORDER — GABAPENTIN 400 MG/1
CAPSULE ORAL
Qty: 90 CAPSULE | Refills: 4 | OUTPATIENT
Start: 2018-08-10

## 2018-08-10 RX ADMIN — TESTOSTERONE CYPIONATE 100 MG: 200 INJECTION, SOLUTION INTRAMUSCULAR at 08:41

## 2018-08-17 ENCOUNTER — OFFICE VISIT (OUTPATIENT)
Dept: FAMILY MEDICINE CLINIC | Facility: CLINIC | Age: 48
End: 2018-08-17

## 2018-08-17 VITALS
TEMPERATURE: 98 F | SYSTOLIC BLOOD PRESSURE: 110 MMHG | DIASTOLIC BLOOD PRESSURE: 70 MMHG | HEIGHT: 76 IN | OXYGEN SATURATION: 97 % | BODY MASS INDEX: 38.36 KG/M2 | HEART RATE: 78 BPM | WEIGHT: 315 LBS

## 2018-08-17 DIAGNOSIS — E11.9 TYPE 2 DIABETES MELLITUS WITHOUT COMPLICATION, WITHOUT LONG-TERM CURRENT USE OF INSULIN (HCC): ICD-10-CM

## 2018-08-17 DIAGNOSIS — I95.1 ORTHOSTATIC HYPOTENSION: ICD-10-CM

## 2018-08-17 DIAGNOSIS — E11.42 DIABETIC PERIPHERAL NEUROPATHY (HCC): ICD-10-CM

## 2018-08-17 DIAGNOSIS — E66.01 MORBID OBESITY DUE TO EXCESS CALORIES (HCC): ICD-10-CM

## 2018-08-17 DIAGNOSIS — N52.8 OTHER MALE ERECTILE DYSFUNCTION: ICD-10-CM

## 2018-08-17 DIAGNOSIS — E11.42 TYPE 2 DIABETES MELLITUS WITH DIABETIC POLYNEUROPATHY, WITHOUT LONG-TERM CURRENT USE OF INSULIN (HCC): Primary | ICD-10-CM

## 2018-08-17 DIAGNOSIS — I10 BENIGN ESSENTIAL HYPERTENSION: ICD-10-CM

## 2018-08-17 DIAGNOSIS — E78.1 HYPERTRIGLYCERIDEMIA: ICD-10-CM

## 2018-08-17 DIAGNOSIS — E29.1 HYPOGONADISM IN MALE: ICD-10-CM

## 2018-08-17 DIAGNOSIS — I89.0 LYMPHEDEMA: ICD-10-CM

## 2018-08-17 LAB
POC CREATININE URINE: 100
POC MICROALBUMIN URINE: 30

## 2018-08-17 PROCEDURE — 99214 OFFICE O/P EST MOD 30 MIN: CPT | Performed by: FAMILY MEDICINE

## 2018-08-17 PROCEDURE — 82044 UR ALBUMIN SEMIQUANTITATIVE: CPT | Performed by: FAMILY MEDICINE

## 2018-08-17 RX ORDER — SILDENAFIL 100 MG/1
TABLET, FILM COATED ORAL
Qty: 10 TABLET | Refills: 5 | Status: SHIPPED | OUTPATIENT
Start: 2018-08-17

## 2018-08-17 RX ORDER — BLOOD-GLUCOSE METER
KIT MISCELLANEOUS
Qty: 1 EACH | Refills: 0 | Status: SHIPPED | OUTPATIENT
Start: 2018-08-17

## 2018-08-17 NOTE — PROGRESS NOTES
Subjective   Marcial Desai is a 48 y.o. male. Presents today for   Chief Complaint   Patient presents with   • Dizziness     pt has been getting dizzy after he stands up. he looked up side effects and on 3 medications it says causes dizziness.    • Diabetes     pt here for 3 month med review and labs       Dizziness   This is a chronic problem. The current episode started more than 1 year ago. The problem occurs constantly. The problem has been waxing and waning. Pertinent negatives include no chest pain or vertigo. Associated symptoms comments: Lightheaded and dizzy when stands up or bends over.  Has been working on weight loss, lost 12 lbs since 3/2018.  Have been cutting down medication.. Exacerbated by: standing up, bending over. Treatments tried: takes time getting up;  had 1 fall no injuries. The treatment provided mild relief.   Diabetes   He presents for his follow-up diabetic visit. He has type 2 diabetes mellitus. His disease course has been stable. Hypoglycemia symptoms include dizziness. Associated symptoms include foot paresthesias. Pertinent negatives for diabetes include no chest pain and no foot ulcerations. Symptoms are stable. Diabetic complications include peripheral neuropathy. Pertinent negatives for diabetic complications include no CVA. Risk factors for coronary artery disease include dyslipidemia, diabetes mellitus, hypertension, male sex and obesity. Current diabetic treatment includes oral agent (dual therapy) (GLP-1). He is compliant with treatment all of the time. There is no change in his home blood glucose trend. An ACE inhibitor/angiotensin II receptor blocker is being taken.   Hypertension   This is a chronic problem. The current episode started more than 1 year ago. The problem is unchanged. The problem is controlled. Associated symptoms include peripheral edema (severe lymphedema; had vein surgery;   donmarilyn oka;  has some nerve damage). Pertinent negatives include no chest pain,  orthopnea, palpitations, PND or shortness of breath. There are no associated agents to hypertension. Risk factors for coronary artery disease include dyslipidemia, diabetes mellitus, male gender and obesity. Past treatments include ACE inhibitors and beta blockers. Current antihypertension treatment includes beta blockers and ACE inhibitors. The current treatment provides significant improvement. There are no compliance problems.  There is no history of kidney disease, CAD/MI, CVA, heart failure or left ventricular hypertrophy. There is no history of chronic renal disease.   Hyperlipidemia   This is a chronic problem. The current episode started more than 1 year ago. The problem is controlled. Recent lipid tests were reviewed and are normal. Exacerbating diseases include diabetes. He has no history of chronic renal disease. Factors aggravating his hyperlipidemia include beta blockers. Pertinent negatives include no chest pain or shortness of breath. Current antihyperlipidemic treatment includes statins. The current treatment provides moderate improvement of lipids. There are no compliance problems.  Risk factors for coronary artery disease include dyslipidemia, diabetes mellitus, hypertension, male sex and obesity.       Review of Systems   Respiratory: Negative for shortness of breath.    Cardiovascular: Negative for chest pain, palpitations, orthopnea and PND.   Neurological: Positive for dizziness. Negative for vertigo.       Patient Active Problem List   Diagnosis   • Ankle pain   • Atopic dermatitis   • Benign essential hypertension   • Type 2 diabetes mellitus with diabetic polyneuropathy, without long-term current use of insulin (CMS/HCC)   • Diabetic peripheral neuropathy (CMS/HCC)   • Abnormal liver function tests   • Idiopathic chronic gout of multiple sites without tophus   • Hypertriglyceridemia   • Cramps of lower extremity   • Edema of lower extremity   • Lymphedema   • Osteoarthritis   • Morbid  obesity due to excess calories (CMS/[facility])       Social History     Social History   • Marital status:      Social History Main Topics   • Smoking status: Never Smoker   • Smokeless tobacco: Never Used   • Alcohol use No   • Drug use: No     Other Topics Concern   • Not on file       Allergies   Allergen Reactions   • Cortisone Shortness Of Breath       Current Outpatient Prescriptions on File Prior to Visit   Medication Sig Dispense Refill   • ACCU-CHEK TYREL PLUS test strip TEST DAILY 50 each 2   • allopurinol (ZYLOPRIM) 300 MG tablet TAKE ONE TABLET BY MOUTH TWICE A DAY 54 tablet 0   • atorvastatin (LIPITOR) 20 MG tablet TAKE ONE TABLET BY MOUTH EVERY NIGHT 30 tablet 3   • Blood Glucose Monitoring Suppl (ACCU-CHEK TYREL PLUS) w/Device kit      • buPROPion XL (WELLBUTRIN XL) 150 MG 24 hr tablet Take 1 tablet by mouth Daily. 30 tablet 1   • cetirizine (zyrTEC) 10 MG tablet TAKE ONE TABLET BY MOUTH DAILY 90 tablet 3   • Dapagliflozin Propanediol (FARXIGA) 10 MG tablet Take 1 tablet by mouth Daily. 30 tablet 1   • fluticasone (FLONASE) 50 MCG/ACT nasal spray PLACE 2 SPRAYS IN EACH NOSTRIL DAILY 16 g 0   • furosemide (LASIX) 40 MG tablet TAKE ONE TABLET BY MOUTH DAILY 90 tablet 0   • gabapentin (NEURONTIN) 400 MG capsule Take 1 capsule by mouth 3 (Three) Times a Day. 90 capsule 5   • hydrocortisone 2.5 % cream Apply  topically 2 (two) times a day. Wipe bottom twice daily 28 g 0   •  MG tablet TAKE ONE TABLET BY MOUTH EVERY 8 HOURS AS NEEDED FOR MILD OR MODERATE PAIN 90 tablet 0   • Lancets misc 1 Device Daily. Dispense brand covered by insurance. Dx. e11.9 100 each 12   • lisinopril (PRINIVIL,ZESTRIL) 30 MG tablet Take 1 tablet by mouth Daily. 30 tablet 5   • magnesium oxide (MAGOX) 400 (241.3 Mg) MG tablet tablet TAKE ONE TABLET BY MOUTH DAILY 13 tablet 0   • magnesium oxide (MAGOX) 400 (241.3 Mg) MG tablet tablet TAKE ONE TABLET BY MOUTH DAILY 30 tablet 0   • metFORMIN (GLUCOPHAGE) 1000 MG tablet TAKE  "ONE TABLET BY MOUTH EVERY MORNING WITH BREAKFAST 30 tablet 10   • mupirocin (BACTROBAN) 2 % ointment APPLY TO AFFECTED AREA(S) TWO TIMES A DAY 22 g 1   • potassium chloride (K-DUR,KLOR-CON) 10 MEQ CR tablet TAKE THREE TABLETS BY MOUTH TWICE A  tablet 0   • spironolactone (ALDACTONE) 25 MG tablet Take 1 tablet by mouth Daily. 30 tablet 11   • testosterone (ANDROGEL) 25 MG/2.5GM (1%) gel gel Place 50 mg on the skin Daily. 1 each 5   • Testosterone 20.25 MG/1.25GM (1.62%) gel 1 pump each armpit daily 1.25 g 5   • triamcinolone (KENALOG) 0.1 % cream APPLY A THIN LAYER TO AFFECTED AREA TWO TIMES A DAY 30 g 2   • TRULICITY 0.75 MG/0.5ML solution pen-injector INJECT 1 SYRINGE (.5ML) (0.75) MG UNDER THE SKIN ONCE WEEKLY 2 mL 3   • [DISCONTINUED] atenolol (TENORMIN) 25 MG tablet Take 1 tablet by mouth Daily. 1 po daily 90 tablet 1     Current Facility-Administered Medications on File Prior to Visit   Medication Dose Route Frequency Provider Last Rate Last Dose   • Testosterone Cypionate (DEPOTESTOTERONE CYPIONATE) injection 100 mg  100 mg Intramuscular Q14 Days Tesfaye Swartz DO   100 mg at 08/10/18 0841       Objective   Vitals:    08/17/18 1134   BP: 110/70   BP Location: Left arm   Patient Position: Sitting   Cuff Size: Large Adult   Pulse: 78   Temp: 98 °F (36.7 °C)   TempSrc: Oral   SpO2: 97%   Weight: (!) 170 kg (375 lb)   Height: 193 cm (75.98\")       Physical Exam   Constitutional: He appears well-developed and well-nourished.   HENT:   Head: Normocephalic and atraumatic.   Neck: Neck supple. No JVD present. No thyromegaly present.   Cardiovascular: Normal rate, regular rhythm and normal heart sounds.  Exam reveals no gallop and no friction rub.    No murmur heard.  Pulmonary/Chest: Effort normal and breath sounds normal. No respiratory distress. He has no wheezes. He has no rales.   Abdominal: Soft. Bowel sounds are normal. He exhibits no distension. There is no tenderness. There is no rebound and no " guarding.   Musculoskeletal: He exhibits edema (severe lymphedema).   Neurological: He is alert. Gait abnormal.   Severe antalgic gait   Skin: Skin is warm and dry.   Psychiatric: He has a normal mood and affect. His behavior is normal.   Nursing note and vitals reviewed.    MAL/Cr ordered and reviewed.      Assessment/Plan   Problems Addressed this Visit        Cardiovascular and Mediastinum    Benign essential hypertension    Hypertriglyceridemia    Relevant Orders    Comprehensive Metabolic Panel    Hemoglobin A1c    Lipid Panel       Digestive    Morbid obesity due to excess calories (CMS/[facility])    Relevant Orders    Comprehensive Metabolic Panel    Hemoglobin A1c    Lipid Panel       Endocrine    Type 2 diabetes mellitus with diabetic polyneuropathy, without long-term current use of insulin (CMS/[facility]) - Primary    Relevant Medications    glucose monitor monitoring kit    glucose blood test strip    Other Relevant Orders    Comprehensive Metabolic Panel    Hemoglobin A1c    Lipid Panel    Diabetic peripheral neuropathy (CMS/[facility])       Other    Lymphedema      Other Visit Diagnoses     Type 2 diabetes mellitus without complication, without long-term current use of insulin (CMS/[facility])        Relevant Orders    POCT microalbumin    Comprehensive Metabolic Panel    Hemoglobin A1c    Lipid Panel    Orthostatic hypotension        Other male erectile dysfunction        Relevant Medications    sildenafil (VIAGRA) 100 MG tablet    Hypogonadism in male            -continue work on diet and weight loss  -check dm2 labs  -bp over controlled;  Stop atenolol;  Continue lisinopril  -HLD - continue statin, recheck lipids.  -had to miss testosterone for surgery;  Wait to check next time.       -Follow up: 3 months and prn

## 2018-08-18 LAB
ALBUMIN SERPL-MCNC: 4.7 G/DL (ref 3.5–5.5)
ALBUMIN/GLOB SERPL: 1.8 {RATIO} (ref 1.2–2.2)
ALP SERPL-CCNC: 86 IU/L (ref 39–117)
ALT SERPL-CCNC: 31 IU/L (ref 0–44)
AST SERPL-CCNC: 31 IU/L (ref 0–40)
BILIRUB SERPL-MCNC: 0.7 MG/DL (ref 0–1.2)
BUN SERPL-MCNC: 24 MG/DL (ref 6–24)
BUN/CREAT SERPL: 16 (ref 9–20)
CALCIUM SERPL-MCNC: 9.5 MG/DL (ref 8.7–10.2)
CHLORIDE SERPL-SCNC: 95 MMOL/L (ref 96–106)
CHOLEST SERPL-MCNC: 103 MG/DL (ref 100–199)
CO2 SERPL-SCNC: 24 MMOL/L (ref 20–29)
CREAT SERPL-MCNC: 1.49 MG/DL (ref 0.76–1.27)
GLOBULIN SER CALC-MCNC: 2.6 G/DL (ref 1.5–4.5)
GLUCOSE SERPL-MCNC: 110 MG/DL (ref 65–99)
HBA1C MFR BLD: 7.1 % (ref 4.8–5.6)
HDLC SERPL-MCNC: 29 MG/DL
LDLC SERPL CALC-MCNC: 28 MG/DL (ref 0–99)
POTASSIUM SERPL-SCNC: 4.3 MMOL/L (ref 3.5–5.2)
PROT SERPL-MCNC: 7.3 G/DL (ref 6–8.5)
SODIUM SERPL-SCNC: 137 MMOL/L (ref 134–144)
TRIGL SERPL-MCNC: 231 MG/DL (ref 0–149)
VLDLC SERPL CALC-MCNC: 46 MG/DL (ref 5–40)

## 2018-08-18 NOTE — PROGRESS NOTES
Diabetes is near goal, go ahead and increase trulicity to 1.5mg weekly  Cholesterol is adequately controlled.  Triglycerides are mildly elevated, working on diabetes should improve.  Continue work on weight loss.  Kidney function declined from last check.  Avoid nsaids and will need to work on diabetic control as doing to protect kidneys.  Rest of labs normal or stable.

## 2018-08-22 ENCOUNTER — TELEPHONE (OUTPATIENT)
Dept: FAMILY MEDICINE CLINIC | Facility: CLINIC | Age: 48
End: 2018-08-22

## 2018-08-22 NOTE — TELEPHONE ENCOUNTER
Decrease lisinopril to 10mg 1 po daily and stay off atenolol for now.  See if this causes to even out.  Call back after 4 to 5 days to let know how doing.  Sooner if not doing well.

## 2018-08-23 RX ORDER — LISINOPRIL 10 MG/1
10 TABLET ORAL DAILY
Qty: 30 TABLET | Refills: 5 | Status: SHIPPED | OUTPATIENT
Start: 2018-08-23 | End: 2019-02-04 | Stop reason: SDUPTHER

## 2018-08-24 ENCOUNTER — CLINICAL SUPPORT (OUTPATIENT)
Dept: FAMILY MEDICINE CLINIC | Facility: CLINIC | Age: 48
End: 2018-08-24

## 2018-08-24 DIAGNOSIS — E29.1 HYPOGONADISM IN MALE: ICD-10-CM

## 2018-08-24 PROCEDURE — 96372 THER/PROPH/DIAG INJ SC/IM: CPT | Performed by: FAMILY MEDICINE

## 2018-08-24 RX ADMIN — TESTOSTERONE CYPIONATE 100 MG: 200 INJECTION, SOLUTION INTRAMUSCULAR at 09:04

## 2018-08-29 DIAGNOSIS — E11.42 TYPE 2 DIABETES MELLITUS WITH DIABETIC POLYNEUROPATHY, WITHOUT LONG-TERM CURRENT USE OF INSULIN (HCC): ICD-10-CM

## 2018-08-29 DIAGNOSIS — E66.01 MORBID OBESITY DUE TO EXCESS CALORIES (HCC): ICD-10-CM

## 2018-09-06 DIAGNOSIS — M1A.09X0 IDIOPATHIC CHRONIC GOUT OF MULTIPLE SITES WITHOUT TOPHUS: ICD-10-CM

## 2018-09-06 DIAGNOSIS — F32.2 SEVERE SINGLE CURRENT EPISODE OF MAJOR DEPRESSIVE DISORDER, WITHOUT PSYCHOTIC FEATURES (HCC): ICD-10-CM

## 2018-09-06 RX ORDER — IBUPROFEN 800 MG/1
TABLET ORAL
Qty: 90 TABLET | Refills: 11 | Status: SHIPPED | OUTPATIENT
Start: 2018-09-06 | End: 2019-08-06 | Stop reason: SDUPTHER

## 2018-09-06 RX ORDER — FUROSEMIDE 40 MG/1
TABLET ORAL
Qty: 30 TABLET | Refills: 11 | Status: SHIPPED | OUTPATIENT
Start: 2018-09-06 | End: 2019-06-26 | Stop reason: SDUPTHER

## 2018-09-06 RX ORDER — BUPROPION HYDROCHLORIDE 150 MG/1
TABLET ORAL
Qty: 30 TABLET | Refills: 0 | Status: SHIPPED | OUTPATIENT
Start: 2018-09-06 | End: 2018-10-04 | Stop reason: SDUPTHER

## 2018-09-07 ENCOUNTER — CLINICAL SUPPORT (OUTPATIENT)
Dept: FAMILY MEDICINE CLINIC | Facility: CLINIC | Age: 48
End: 2018-09-07

## 2018-09-07 DIAGNOSIS — E29.1 HYPOGONADISM IN MALE: ICD-10-CM

## 2018-09-07 PROCEDURE — 96372 THER/PROPH/DIAG INJ SC/IM: CPT | Performed by: FAMILY MEDICINE

## 2018-09-07 RX ADMIN — TESTOSTERONE CYPIONATE 100 MG: 200 INJECTION, SOLUTION INTRAMUSCULAR at 08:39

## 2018-09-21 ENCOUNTER — CLINICAL SUPPORT (OUTPATIENT)
Dept: FAMILY MEDICINE CLINIC | Facility: CLINIC | Age: 48
End: 2018-09-21

## 2018-09-21 DIAGNOSIS — E29.1 HYPOGONADISM IN MALE: ICD-10-CM

## 2018-09-21 PROCEDURE — 96372 THER/PROPH/DIAG INJ SC/IM: CPT | Performed by: FAMILY MEDICINE

## 2018-09-21 RX ADMIN — TESTOSTERONE CYPIONATE 100 MG: 200 INJECTION, SOLUTION INTRAMUSCULAR at 09:13

## 2018-10-04 DIAGNOSIS — E11.42 TYPE 2 DIABETES MELLITUS WITH DIABETIC POLYNEUROPATHY, WITHOUT LONG-TERM CURRENT USE OF INSULIN (HCC): ICD-10-CM

## 2018-10-04 DIAGNOSIS — I10 BENIGN ESSENTIAL HYPERTENSION: ICD-10-CM

## 2018-10-04 DIAGNOSIS — E66.01 MORBID OBESITY DUE TO EXCESS CALORIES (HCC): ICD-10-CM

## 2018-10-04 DIAGNOSIS — F32.2 SEVERE SINGLE CURRENT EPISODE OF MAJOR DEPRESSIVE DISORDER, WITHOUT PSYCHOTIC FEATURES (HCC): ICD-10-CM

## 2018-10-04 RX ORDER — BUPROPION HYDROCHLORIDE 150 MG/1
TABLET ORAL
Qty: 30 TABLET | Refills: 6 | Status: SHIPPED | OUTPATIENT
Start: 2018-10-04 | End: 2019-05-07 | Stop reason: SDUPTHER

## 2018-10-04 RX ORDER — POTASSIUM CHLORIDE 750 MG/1
TABLET, EXTENDED RELEASE ORAL
Qty: 54 TABLET | Refills: 0 | Status: SHIPPED | OUTPATIENT
Start: 2018-10-04

## 2018-10-04 RX ORDER — DULAGLUTIDE 0.75 MG/.5ML
INJECTION, SOLUTION SUBCUTANEOUS
Qty: 2 ML | Refills: 2 | Status: SHIPPED | OUTPATIENT
Start: 2018-10-04 | End: 2018-12-13 | Stop reason: DRUGHIGH

## 2018-10-04 RX ORDER — ATENOLOL 25 MG/1
TABLET ORAL
Qty: 90 TABLET | Refills: 0 | OUTPATIENT
Start: 2018-10-04

## 2018-10-04 RX ORDER — ALLOPURINOL 300 MG/1
TABLET ORAL
Qty: 60 TABLET | Refills: 5 | Status: SHIPPED | OUTPATIENT
Start: 2018-10-04 | End: 2020-08-07

## 2018-10-19 ENCOUNTER — CLINICAL SUPPORT (OUTPATIENT)
Dept: FAMILY MEDICINE CLINIC | Facility: CLINIC | Age: 48
End: 2018-10-19

## 2018-10-19 DIAGNOSIS — E29.1 HYPOGONADISM IN MALE: ICD-10-CM

## 2018-10-19 PROCEDURE — 96372 THER/PROPH/DIAG INJ SC/IM: CPT | Performed by: FAMILY MEDICINE

## 2018-10-19 RX ADMIN — TESTOSTERONE CYPIONATE 100 MG: 200 INJECTION, SOLUTION INTRAMUSCULAR at 09:00

## 2018-11-02 ENCOUNTER — CLINICAL SUPPORT (OUTPATIENT)
Dept: FAMILY MEDICINE CLINIC | Facility: CLINIC | Age: 48
End: 2018-11-02

## 2018-11-02 DIAGNOSIS — Z23 FLU VACCINE NEED: Primary | ICD-10-CM

## 2018-11-02 DIAGNOSIS — E29.1 HYPOGONADISM IN MALE: ICD-10-CM

## 2018-11-02 PROCEDURE — 90471 IMMUNIZATION ADMIN: CPT | Performed by: FAMILY MEDICINE

## 2018-11-02 PROCEDURE — 90674 CCIIV4 VAC NO PRSV 0.5 ML IM: CPT | Performed by: FAMILY MEDICINE

## 2018-11-02 PROCEDURE — 96372 THER/PROPH/DIAG INJ SC/IM: CPT | Performed by: FAMILY MEDICINE

## 2018-11-02 RX ADMIN — TESTOSTERONE CYPIONATE 100 MG: 200 INJECTION, SOLUTION INTRAMUSCULAR at 09:25

## 2018-11-19 ENCOUNTER — OFFICE VISIT (OUTPATIENT)
Dept: FAMILY MEDICINE CLINIC | Facility: CLINIC | Age: 48
End: 2018-11-19

## 2018-11-19 VITALS
BODY MASS INDEX: 44.45 KG/M2 | SYSTOLIC BLOOD PRESSURE: 130 MMHG | OXYGEN SATURATION: 98 % | DIASTOLIC BLOOD PRESSURE: 72 MMHG | TEMPERATURE: 98.3 F | HEART RATE: 95 BPM | WEIGHT: 315 LBS

## 2018-11-19 DIAGNOSIS — E78.1 HYPERTRIGLYCERIDEMIA: ICD-10-CM

## 2018-11-19 DIAGNOSIS — Z00.00 WELLNESS EXAMINATION: Primary | ICD-10-CM

## 2018-11-19 DIAGNOSIS — R53.83 OTHER FATIGUE: ICD-10-CM

## 2018-11-19 DIAGNOSIS — E11.42 TYPE 2 DIABETES MELLITUS WITH DIABETIC POLYNEUROPATHY, WITHOUT LONG-TERM CURRENT USE OF INSULIN (HCC): ICD-10-CM

## 2018-11-19 DIAGNOSIS — I10 BENIGN ESSENTIAL HYPERTENSION: ICD-10-CM

## 2018-11-19 DIAGNOSIS — M1A.09X0 IDIOPATHIC CHRONIC GOUT OF MULTIPLE SITES WITHOUT TOPHUS: ICD-10-CM

## 2018-11-19 DIAGNOSIS — E66.01 MORBID OBESITY DUE TO EXCESS CALORIES (HCC): ICD-10-CM

## 2018-11-19 DIAGNOSIS — I89.0 LYMPHEDEMA: ICD-10-CM

## 2018-11-19 DIAGNOSIS — E29.1 HYPOGONADISM IN MALE: ICD-10-CM

## 2018-11-19 DIAGNOSIS — Z23 IMMUNIZATION DUE: ICD-10-CM

## 2018-11-19 PROCEDURE — 90632 HEPA VACCINE ADULT IM: CPT | Performed by: FAMILY MEDICINE

## 2018-11-19 PROCEDURE — 90471 IMMUNIZATION ADMIN: CPT | Performed by: FAMILY MEDICINE

## 2018-11-19 PROCEDURE — 99396 PREV VISIT EST AGE 40-64: CPT | Performed by: FAMILY MEDICINE

## 2018-11-19 PROCEDURE — 96372 THER/PROPH/DIAG INJ SC/IM: CPT | Performed by: FAMILY MEDICINE

## 2018-11-19 RX ORDER — AMOXICILLIN 500 MG/1
500 CAPSULE ORAL 3 TIMES DAILY
Qty: 30 CAPSULE | Refills: 0 | COMMUNITY
End: 2018-12-06

## 2018-11-19 RX ADMIN — TESTOSTERONE CYPIONATE 100 MG: 200 INJECTION, SOLUTION INTRAMUSCULAR at 10:11

## 2018-11-19 NOTE — PATIENT INSTRUCTIONS

## 2018-11-19 NOTE — PROGRESS NOTES
Subjective   Marcial Desai is a 48 y.o. male. Presents today for   Chief Complaint   Patient presents with   • Annual Exam     med check and labs     Patient here for his annual visit.  History of Present Illness  Patient with dm2, htn, hld, lymphedema, gout;  Working on weight loss and lost another 10lbs.   No cp/soa;  Has cough, congestoin and earache today, cold from daughter.  Legs still swell and burn.  Has hypogonadism, due for testosterone but missed several doses of testosterone due to insurance.    Review of Systems   Respiratory: Negative for shortness of breath.    Cardiovascular: Positive for leg swelling. Negative for chest pain and palpitations.   Gastrointestinal: Negative for abdominal pain, nausea and vomiting.   Neurological: Negative for syncope.       Patient Active Problem List   Diagnosis   • Ankle pain   • Atopic dermatitis   • Benign essential hypertension   • Type 2 diabetes mellitus with diabetic polyneuropathy, without long-term current use of insulin (CMS/HCC)   • Diabetic peripheral neuropathy (CMS/HCC)   • Abnormal liver function tests   • Idiopathic chronic gout of multiple sites without tophus   • Hypertriglyceridemia   • Cramps of lower extremity   • Edema of lower extremity   • Lymphedema   • Osteoarthritis   • Morbid obesity due to excess calories (CMS/HCC)   • Hypogonadism in male       Social History     Socioeconomic History   • Marital status:      Spouse name: Not on file   • Number of children: Not on file   • Years of education: Not on file   • Highest education level: Not on file   Tobacco Use   • Smoking status: Never Smoker   • Smokeless tobacco: Never Used   Substance and Sexual Activity   • Alcohol use: No   • Drug use: No       Allergies   Allergen Reactions   • Cortisone Shortness Of Breath       Current Outpatient Medications on File Prior to Visit   Medication Sig Dispense Refill   • ACCU-CHEK TYREL PLUS test strip TEST DAILY 50 each 2   • allopurinol (ZYLOPRIM)  300 MG tablet TAKE ONE TABLET BY MOUTH TWICE A DAY 54 tablet 0   • allopurinol (ZYLOPRIM) 300 MG tablet TAKE ONE TABLET BY MOUTH TWICE A DAY 60 tablet 5   • atorvastatin (LIPITOR) 20 MG tablet TAKE ONE TABLET BY MOUTH EVERY NIGHT 30 tablet 3   • Blood Glucose Monitoring Suppl (ACCU-CHEK TYREL PLUS) w/Device kit      • buPROPion XL (WELLBUTRIN XL) 150 MG 24 hr tablet TAKE ONE TABLET BY MOUTH DAILY 30 tablet 6   • cetirizine (zyrTEC) 10 MG tablet TAKE ONE TABLET BY MOUTH DAILY 90 tablet 3   • Dapagliflozin Propanediol (FARXIGA) 10 MG tablet Take 1 tablet by mouth Daily. 30 tablet 1   • Dulaglutide (TRULICITY) 0.75 MG/0.5ML solution pen-injector Inject 1.5 mg under the skin into the appropriate area as directed Every 7 (Seven) Days. 2 mL 3   • fluticasone (FLONASE) 50 MCG/ACT nasal spray PLACE 2 SPRAYS IN EACH NOSTRIL DAILY 16 g 0   • furosemide (LASIX) 40 MG tablet TAKE ONE TABLET BY MOUTH DAILY 30 tablet 11   • gabapentin (NEURONTIN) 400 MG capsule Take 1 capsule by mouth 3 (Three) Times a Day. 90 capsule 5   • glucose blood test strip Check once daily E11.9 non-iddm;  States [employer] brand covered;  Also disp alcohol swabs, lancets. 50 each 12   • glucose monitor monitoring kit Check once daily d x E11.9 non-iddm 1 each 0   • hydrocortisone 2.5 % cream Apply  topically 2 (two) times a day. Wipe bottom twice daily 28 g 0   •  MG tablet TAKE ONE TABLET BY MOUTH EVERY 8 HOURS AS NEEDED FOR MILD OR MODERATE PAIN 90 tablet 11   • Lancets misc 1 Device Daily. Dispense brand covered by insurance. Dx. e11.9 100 each 12   • lisinopril (PRINIVIL,ZESTRIL) 10 MG tablet Take 1 tablet by mouth Daily. 30 tablet 5   • magnesium oxide (MAGOX) 400 (241.3 Mg) MG tablet tablet TAKE ONE TABLET BY MOUTH DAILY 13 tablet 0   • metFORMIN (GLUCOPHAGE) 1000 MG tablet TAKE ONE TABLET BY MOUTH EVERY MORNING WITH BREAKFAST 30 tablet 9   • mupirocin (BACTROBAN) 2 % ointment APPLY TO AFFECTED AREA(S) TWO TIMES A DAY 22 g 1   • potassium  chloride (K-DUR,KLOR-CON) 10 MEQ CR tablet TAKE THREE TABLETS BY MOUTH TWICE A DAY 54 tablet 0   • sildenafil (VIAGRA) 100 MG tablet 1/2 to 1 po daily prn ED 10 tablet 5   • spironolactone (ALDACTONE) 25 MG tablet Take 1 tablet by mouth Daily. 30 tablet 11   • triamcinolone (KENALOG) 0.1 % cream APPLY A THIN LAYER TO AFFECTED AREA TWO TIMES A DAY 30 g 2   • TRULICITY 0.75 MG/0.5ML solution pen-injector INJECT 1 SYRINGE UNDER THE SKIN ONCE WEEKLY 2 mL 2   • [DISCONTINUED] magnesium oxide (MAGOX) 400 (241.3 Mg) MG tablet tablet Take 1 tablet by mouth Daily. 30 tablet 11   • amoxicillin (AMOXIL) 500 MG capsule Take 1 capsule by mouth 3 (Three) Times a Day. 30 capsule 0   • [DISCONTINUED] testosterone (ANDROGEL) 25 MG/2.5GM (1%) gel gel Place 50 mg on the skin Daily. 1 each 5   • [DISCONTINUED] Testosterone 20.25 MG/1.25GM (1.62%) gel 1 pump each armpit daily 1.25 g 5     Current Facility-Administered Medications on File Prior to Visit   Medication Dose Route Frequency Provider Last Rate Last Dose   • Testosterone Cypionate (DEPOTESTOTERONE CYPIONATE) injection 100 mg  100 mg Intramuscular Q14 Days Tesfaye Swartz DO   100 mg at 11/02/18 0925       Objective   Vitals:    11/19/18 0846   BP: 130/72   Pulse: 95   Temp: 98.3 °F (36.8 °C)   SpO2: 98%   Weight: (!) 166 kg (365 lb)       Physical Exam   Constitutional: He appears well-developed and well-nourished.   HENT:   Head: Normocephalic and atraumatic.   Right Ear: Tympanic membrane is injected and bulging. A middle ear effusion is present.   Nose: Mucosal edema present.   Left obscured with cerumen   Neck: Neck supple. No JVD present. No thyromegaly present.   Cardiovascular: Normal rate, regular rhythm and normal heart sounds. Exam reveals no gallop and no friction rub.   No murmur heard.  Pulmonary/Chest: Effort normal and breath sounds normal. No respiratory distress. He has no wheezes. He has no rales.   Abdominal: Soft. Bowel sounds are normal. He exhibits no  distension. There is no tenderness. There is no rebound and no guarding.   Musculoskeletal: He exhibits no edema.   Neurological: He is alert.   Skin: Skin is warm and dry.   Psychiatric: He has a normal mood and affect. His behavior is normal.   Nursing note and vitals reviewed.      Assessment/Plan   Marcial was seen today for annual exam.    Diagnoses and all orders for this visit:    Wellness examination    Type 2 diabetes mellitus with diabetic polyneuropathy, without long-term current use of insulin (CMS/[facility])    Morbid obesity due to excess calories (CMS/[facility])    Benign essential hypertension    Lymphedema    Hypogonadism in male    Hypertriglyceridemia    Idiopathic chronic gout of multiple sites without tophus    Immunization due  -     Hepatitis A Vaccine Adult IM    Other fatigue  -     Ambulatory Referral to Sleep Medicine    counseled on diet and exercise  Doing well with weight loss, down 22 lbs from March 2018.  Ok testosterone today;  Full labs at f/u since missing doses.  2nd Hep A due  Fatigue - never had sleep study as not covered by insurance last year;  WIll try again as possible mike  Patient to call on what meter covered and strips.       -Follow up: 3 months and prn

## 2018-11-30 ENCOUNTER — CLINICAL SUPPORT (OUTPATIENT)
Dept: FAMILY MEDICINE CLINIC | Facility: CLINIC | Age: 48
End: 2018-11-30

## 2018-11-30 DIAGNOSIS — E29.1 HYPOGONADISM IN MALE: ICD-10-CM

## 2018-11-30 PROCEDURE — 96372 THER/PROPH/DIAG INJ SC/IM: CPT | Performed by: FAMILY MEDICINE

## 2018-11-30 RX ADMIN — TESTOSTERONE CYPIONATE 100 MG: 200 INJECTION, SOLUTION INTRAMUSCULAR at 09:01

## 2018-12-06 DIAGNOSIS — E11.42 TYPE 2 DIABETES MELLITUS WITH DIABETIC POLYNEUROPATHY, WITHOUT LONG-TERM CURRENT USE OF INSULIN (HCC): ICD-10-CM

## 2018-12-06 DIAGNOSIS — E78.1 HYPERTRIGLYCERIDEMIA: ICD-10-CM

## 2018-12-06 RX ORDER — ATORVASTATIN CALCIUM 20 MG/1
TABLET, FILM COATED ORAL
Qty: 30 TABLET | Refills: 2 | Status: SHIPPED | OUTPATIENT
Start: 2018-12-06 | End: 2019-03-10 | Stop reason: SDUPTHER

## 2018-12-10 RX ORDER — GABAPENTIN 400 MG/1
CAPSULE ORAL
Qty: 90 CAPSULE | Refills: 0 | OUTPATIENT
Start: 2018-12-10

## 2018-12-28 ENCOUNTER — CLINICAL SUPPORT (OUTPATIENT)
Dept: FAMILY MEDICINE CLINIC | Facility: CLINIC | Age: 48
End: 2018-12-28

## 2018-12-28 DIAGNOSIS — E29.1 HYPOGONADISM IN MALE: ICD-10-CM

## 2018-12-28 PROCEDURE — 96372 THER/PROPH/DIAG INJ SC/IM: CPT | Performed by: FAMILY MEDICINE

## 2018-12-28 RX ADMIN — TESTOSTERONE CYPIONATE 100 MG: 200 INJECTION, SOLUTION INTRAMUSCULAR at 08:57

## 2019-01-03 ENCOUNTER — APPOINTMENT (OUTPATIENT)
Dept: SLEEP MEDICINE | Facility: HOSPITAL | Age: 49
End: 2019-01-03

## 2019-01-11 ENCOUNTER — CLINICAL SUPPORT (OUTPATIENT)
Dept: FAMILY MEDICINE CLINIC | Facility: CLINIC | Age: 49
End: 2019-01-11

## 2019-01-11 DIAGNOSIS — E29.1 HYPOGONADISM IN MALE: ICD-10-CM

## 2019-01-11 PROCEDURE — 96372 THER/PROPH/DIAG INJ SC/IM: CPT | Performed by: FAMILY MEDICINE

## 2019-01-11 RX ADMIN — TESTOSTERONE CYPIONATE 100 MG: 200 INJECTION, SOLUTION INTRAMUSCULAR at 08:41

## 2019-01-22 ENCOUNTER — APPOINTMENT (OUTPATIENT)
Dept: GENERAL RADIOLOGY | Facility: HOSPITAL | Age: 49
End: 2019-01-22

## 2019-01-22 ENCOUNTER — HOSPITAL ENCOUNTER (EMERGENCY)
Facility: HOSPITAL | Age: 49
Discharge: HOME | End: 2019-01-22
Attending: EMERGENCY MEDICINE | Admitting: EMERGENCY MEDICINE

## 2019-01-22 VITALS
TEMPERATURE: 97.9 F | SYSTOLIC BLOOD PRESSURE: 159 MMHG | WEIGHT: 315 LBS | HEART RATE: 106 BPM | OXYGEN SATURATION: 96 % | BODY MASS INDEX: 39.17 KG/M2 | RESPIRATION RATE: 16 BRPM | HEIGHT: 75 IN | DIASTOLIC BLOOD PRESSURE: 98 MMHG

## 2019-01-22 DIAGNOSIS — T14.8XXA MUSCLE STRAIN: ICD-10-CM
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
## 2019-01-22 PROCEDURE — 73610 X-RAY EXAM OF ANKLE: CPT

## 2019-01-22 PROCEDURE — 73030 X-RAY EXAM OF SHOULDER: CPT

## 2019-01-22 PROCEDURE — 72110 X-RAY EXAM L-2 SPINE 4/>VWS: CPT

## 2019-01-22 PROCEDURE — 99283 EMERGENCY DEPT VISIT LOW MDM: CPT

## 2019-01-22 PROCEDURE — 73630 X-RAY EXAM OF FOOT: CPT

## 2019-01-22 RX ORDER — CYCLOBENZAPRINE HCL 10 MG
10 TABLET ORAL 3 TIMES DAILY PRN
Qty: 15 TABLET | Refills: 0 | Status: SHIPPED | OUTPATIENT
Start: 2019-01-22 | End: 2020-05-13 | Stop reason: SDUPTHER

## 2019-01-22 RX ORDER — HYDROCODONE BITARTRATE AND ACETAMINOPHEN 5; 325 MG/1; MG/1
1 TABLET ORAL ONCE
Status: COMPLETED | OUTPATIENT
Start: 2019-01-22 | End: 2019-01-22

## 2019-01-22 RX ORDER — HYDROCODONE BITARTRATE AND ACETAMINOPHEN 5; 325 MG/1; MG/1
1 TABLET ORAL EVERY 6 HOURS PRN
Qty: 12 TABLET | Refills: 0 | Status: SHIPPED | OUTPATIENT
Start: 2019-01-22 | End: 2020-05-13

## 2019-01-22 RX ADMIN — HYDROCODONE BITARTRATE AND ACETAMINOPHEN 1 TABLET: 5; 325 TABLET ORAL at 17:16

## 2019-01-22 NOTE — ED NOTES
C/o L/Tback, L ankle and left neck/shoulder stiffness/pain, and headache     Sandra Leal, RN  01/22/19 7544       Sandra Leal, RN  01/22/19 2743

## 2019-01-22 NOTE — DISCHARGE INSTRUCTIONS
Pt instructions:  Rest, ice, elevation  Follow up with Primary Care Doctor for further management and to have your blood pressure rechecked.   Return to ER with pain, swelling, numbness/tingling, fever, chills, weakness, nausea, vomiting, diarrhea, abdominal pain, back pain, urinary concerns, chest pain, shortness of breath, dizziness, headache, worsening of symptoms or other concerns.
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
## 2019-02-01 ENCOUNTER — CLINICAL SUPPORT (OUTPATIENT)
Dept: FAMILY MEDICINE CLINIC | Facility: CLINIC | Age: 49
End: 2019-02-01

## 2019-02-01 DIAGNOSIS — E29.1 HYPOGONADISM IN MALE: ICD-10-CM

## 2019-02-01 PROCEDURE — 96372 THER/PROPH/DIAG INJ SC/IM: CPT | Performed by: FAMILY MEDICINE

## 2019-02-01 RX ADMIN — TESTOSTERONE CYPIONATE 100 MG: 200 INJECTION, SOLUTION INTRAMUSCULAR at 09:48

## 2019-02-04 RX ORDER — LISINOPRIL 10 MG/1
TABLET ORAL
Qty: 30 TABLET | Refills: 4 | Status: SHIPPED | OUTPATIENT
Start: 2019-02-04 | End: 2019-06-26 | Stop reason: SDUPTHER

## 2019-02-10 DIAGNOSIS — E11.42 TYPE 2 DIABETES MELLITUS WITH DIABETIC POLYNEUROPATHY, WITHOUT LONG-TERM CURRENT USE OF INSULIN (HCC): ICD-10-CM

## 2019-02-11 RX ORDER — DAPAGLIFLOZIN 10 MG/1
TABLET, FILM COATED ORAL
Qty: 30 TABLET | Refills: 5 | Status: SHIPPED | OUTPATIENT
Start: 2019-02-11 | End: 2019-02-19

## 2019-02-12 ENCOUNTER — TELEPHONE (OUTPATIENT)
Dept: FAMILY MEDICINE CLINIC | Facility: CLINIC | Age: 49
End: 2019-02-12

## 2019-02-19 ENCOUNTER — TELEPHONE (OUTPATIENT)
Dept: FAMILY MEDICINE CLINIC | Facility: CLINIC | Age: 49
End: 2019-02-19

## 2019-02-19 ENCOUNTER — OFFICE VISIT (OUTPATIENT)
Dept: FAMILY MEDICINE CLINIC | Facility: CLINIC | Age: 49
End: 2019-02-19

## 2019-02-19 VITALS
DIASTOLIC BLOOD PRESSURE: 72 MMHG | BODY MASS INDEX: 44.75 KG/M2 | OXYGEN SATURATION: 99 % | WEIGHT: 315 LBS | SYSTOLIC BLOOD PRESSURE: 140 MMHG | HEART RATE: 100 BPM | TEMPERATURE: 98.3 F

## 2019-02-19 DIAGNOSIS — I89.0 LYMPHEDEMA: ICD-10-CM

## 2019-02-19 DIAGNOSIS — Z79.899 DRUG THERAPY: ICD-10-CM

## 2019-02-19 DIAGNOSIS — E78.1 HYPERTRIGLYCERIDEMIA: ICD-10-CM

## 2019-02-19 DIAGNOSIS — R41.3 MEMORY LOSS: ICD-10-CM

## 2019-02-19 DIAGNOSIS — E11.42 TYPE 2 DIABETES MELLITUS WITH DIABETIC POLYNEUROPATHY, WITHOUT LONG-TERM CURRENT USE OF INSULIN (HCC): Primary | ICD-10-CM

## 2019-02-19 DIAGNOSIS — R79.89 ABNORMAL LIVER FUNCTION TESTS: ICD-10-CM

## 2019-02-19 DIAGNOSIS — F90.0 ATTENTION DEFICIT HYPERACTIVITY DISORDER (ADHD), PREDOMINANTLY INATTENTIVE TYPE: ICD-10-CM

## 2019-02-19 DIAGNOSIS — E29.1 HYPOGONADISM IN MALE: ICD-10-CM

## 2019-02-19 DIAGNOSIS — I10 BENIGN ESSENTIAL HYPERTENSION: ICD-10-CM

## 2019-02-19 PROCEDURE — 96372 THER/PROPH/DIAG INJ SC/IM: CPT | Performed by: FAMILY MEDICINE

## 2019-02-19 PROCEDURE — 99214 OFFICE O/P EST MOD 30 MIN: CPT | Performed by: FAMILY MEDICINE

## 2019-02-19 RX ADMIN — TESTOSTERONE CYPIONATE 100 MG: 200 INJECTION, SOLUTION INTRAMUSCULAR at 09:52

## 2019-02-20 LAB
ALBUMIN SERPL-MCNC: 4.7 G/DL (ref 3.5–5.5)
ALBUMIN/GLOB SERPL: 1.6 {RATIO} (ref 1.2–2.2)
ALP SERPL-CCNC: 100 IU/L (ref 39–117)
ALT SERPL-CCNC: 24 IU/L (ref 0–44)
AST SERPL-CCNC: 25 IU/L (ref 0–40)
BASOPHILS # BLD AUTO: 0 X10E3/UL (ref 0–0.2)
BASOPHILS NFR BLD AUTO: 0 %
BILIRUB SERPL-MCNC: 0.6 MG/DL (ref 0–1.2)
BUN SERPL-MCNC: 15 MG/DL (ref 6–24)
BUN/CREAT SERPL: 15 (ref 9–20)
CALCIUM SERPL-MCNC: 9.5 MG/DL (ref 8.7–10.2)
CHLORIDE SERPL-SCNC: 97 MMOL/L (ref 96–106)
CHOLEST SERPL-MCNC: 99 MG/DL (ref 100–199)
CO2 SERPL-SCNC: 22 MMOL/L (ref 20–29)
CREAT SERPL-MCNC: 0.99 MG/DL (ref 0.76–1.27)
EOSINOPHIL # BLD AUTO: 0.1 X10E3/UL (ref 0–0.4)
EOSINOPHIL NFR BLD AUTO: 1 %
ERYTHROCYTE [DISTWIDTH] IN BLOOD BY AUTOMATED COUNT: 15 % (ref 12.3–15.4)
GLOBULIN SER CALC-MCNC: 2.9 G/DL (ref 1.5–4.5)
GLUCOSE SERPL-MCNC: 138 MG/DL (ref 65–99)
HBA1C MFR BLD: 7 % (ref 4.8–5.6)
HCT VFR BLD AUTO: 45.5 % (ref 37.5–51)
HDLC SERPL-MCNC: 30 MG/DL
HGB BLD-MCNC: 15.5 G/DL (ref 13–17.7)
IMM GRANULOCYTES # BLD AUTO: 0 X10E3/UL (ref 0–0.1)
IMM GRANULOCYTES NFR BLD AUTO: 0 %
LDLC SERPL CALC-MCNC: 34 MG/DL (ref 0–99)
LYMPHOCYTES # BLD AUTO: 1.7 X10E3/UL (ref 0.7–3.1)
LYMPHOCYTES NFR BLD AUTO: 27 %
MCH RBC QN AUTO: 29 PG (ref 26.6–33)
MCHC RBC AUTO-ENTMCNC: 34.1 G/DL (ref 31.5–35.7)
MCV RBC AUTO: 85 FL (ref 79–97)
MONOCYTES # BLD AUTO: 0.5 X10E3/UL (ref 0.1–0.9)
MONOCYTES NFR BLD AUTO: 8 %
NEUTROPHILS # BLD AUTO: 3.9 X10E3/UL (ref 1.4–7)
NEUTROPHILS NFR BLD AUTO: 64 %
PLATELET # BLD AUTO: 164 X10E3/UL (ref 150–379)
POTASSIUM SERPL-SCNC: 3.8 MMOL/L (ref 3.5–5.2)
PROT SERPL-MCNC: 7.6 G/DL (ref 6–8.5)
PSA SERPL-MCNC: 0.4 NG/ML (ref 0–4)
RBC # BLD AUTO: 5.35 X10E6/UL (ref 4.14–5.8)
SODIUM SERPL-SCNC: 136 MMOL/L (ref 134–144)
TESTOST SERPL-MCNC: 166 NG/DL (ref 264–916)
TRIGL SERPL-MCNC: 174 MG/DL (ref 0–149)
VLDLC SERPL CALC-MCNC: 35 MG/DL (ref 5–40)
WBC # BLD AUTO: 6.2 X10E3/UL (ref 3.4–10.8)

## 2019-02-21 ENCOUNTER — TELEPHONE (OUTPATIENT)
Dept: FAMILY MEDICINE CLINIC | Facility: CLINIC | Age: 49
End: 2019-02-21

## 2019-02-21 NOTE — TELEPHONE ENCOUNTER
Shanita    D/c jardiance from list;  Looks like they cover steglatro;  Send 15mg 1 po daily disp #30 5 ref.    RRJ

## 2019-03-01 ENCOUNTER — CLINICAL SUPPORT (OUTPATIENT)
Dept: FAMILY MEDICINE CLINIC | Facility: CLINIC | Age: 49
End: 2019-03-01

## 2019-03-01 DIAGNOSIS — E29.1 HYPOGONADISM IN MALE: ICD-10-CM

## 2019-03-01 PROCEDURE — 96372 THER/PROPH/DIAG INJ SC/IM: CPT | Performed by: FAMILY MEDICINE

## 2019-03-01 RX ADMIN — TESTOSTERONE CYPIONATE 100 MG: 200 INJECTION, SOLUTION INTRAMUSCULAR at 12:02

## 2019-03-10 DIAGNOSIS — E11.42 TYPE 2 DIABETES MELLITUS WITH DIABETIC POLYNEUROPATHY, WITHOUT LONG-TERM CURRENT USE OF INSULIN (HCC): ICD-10-CM

## 2019-03-10 DIAGNOSIS — E78.1 HYPERTRIGLYCERIDEMIA: ICD-10-CM

## 2019-03-11 RX ORDER — ATORVASTATIN CALCIUM 20 MG/1
TABLET, FILM COATED ORAL
Qty: 30 TABLET | Refills: 11 | Status: SHIPPED | OUTPATIENT
Start: 2019-03-11 | End: 2019-06-25 | Stop reason: SDUPTHER

## 2019-03-18 ENCOUNTER — CLINICAL SUPPORT (OUTPATIENT)
Dept: FAMILY MEDICINE CLINIC | Facility: CLINIC | Age: 49
End: 2019-03-18

## 2019-03-18 DIAGNOSIS — E29.1 HYPOGONADISM IN MALE: ICD-10-CM

## 2019-03-18 PROCEDURE — 96372 THER/PROPH/DIAG INJ SC/IM: CPT | Performed by: FAMILY MEDICINE

## 2019-03-18 RX ADMIN — TESTOSTERONE CYPIONATE 100 MG: 200 INJECTION, SOLUTION INTRAMUSCULAR at 12:08

## 2019-03-29 ENCOUNTER — CLINICAL SUPPORT (OUTPATIENT)
Dept: FAMILY MEDICINE CLINIC | Facility: CLINIC | Age: 49
End: 2019-03-29

## 2019-03-29 DIAGNOSIS — E29.1 HYPOGONADISM IN MALE: ICD-10-CM

## 2019-03-29 PROCEDURE — 96372 THER/PROPH/DIAG INJ SC/IM: CPT | Performed by: FAMILY MEDICINE

## 2019-03-29 RX ADMIN — TESTOSTERONE CYPIONATE 100 MG: 200 INJECTION, SOLUTION INTRAMUSCULAR at 09:44

## 2019-04-09 DIAGNOSIS — E66.01 MORBID OBESITY DUE TO EXCESS CALORIES (HCC): ICD-10-CM

## 2019-04-09 DIAGNOSIS — E11.42 TYPE 2 DIABETES MELLITUS WITH DIABETIC POLYNEUROPATHY, WITHOUT LONG-TERM CURRENT USE OF INSULIN (HCC): ICD-10-CM

## 2019-04-09 RX ORDER — DULAGLUTIDE 1.5 MG/.5ML
INJECTION, SOLUTION SUBCUTANEOUS
Qty: 2 ML | Refills: 2 | Status: SHIPPED | OUTPATIENT
Start: 2019-04-09 | End: 2019-06-26 | Stop reason: SDUPTHER

## 2019-04-12 ENCOUNTER — CLINICAL SUPPORT (OUTPATIENT)
Dept: FAMILY MEDICINE CLINIC | Facility: CLINIC | Age: 49
End: 2019-04-12

## 2019-04-12 DIAGNOSIS — E29.1 HYPOGONADISM IN MALE: ICD-10-CM

## 2019-04-12 PROCEDURE — 96372 THER/PROPH/DIAG INJ SC/IM: CPT | Performed by: FAMILY MEDICINE

## 2019-04-12 RX ADMIN — TESTOSTERONE CYPIONATE 100 MG: 200 INJECTION, SOLUTION INTRAMUSCULAR at 09:36

## 2019-04-26 ENCOUNTER — CLINICAL SUPPORT (OUTPATIENT)
Dept: FAMILY MEDICINE CLINIC | Facility: CLINIC | Age: 49
End: 2019-04-26

## 2019-04-26 DIAGNOSIS — E29.1 HYPOGONADISM IN MALE: ICD-10-CM

## 2019-04-26 PROCEDURE — 96372 THER/PROPH/DIAG INJ SC/IM: CPT | Performed by: FAMILY MEDICINE

## 2019-04-26 RX ADMIN — TESTOSTERONE CYPIONATE 100 MG: 200 INJECTION, SOLUTION INTRAMUSCULAR at 09:33

## 2019-05-07 DIAGNOSIS — F32.2 SEVERE SINGLE CURRENT EPISODE OF MAJOR DEPRESSIVE DISORDER, WITHOUT PSYCHOTIC FEATURES (HCC): ICD-10-CM

## 2019-05-07 DIAGNOSIS — J00 ACUTE NASOPHARYNGITIS: ICD-10-CM

## 2019-05-07 RX ORDER — CETIRIZINE HYDROCHLORIDE 10 MG/1
TABLET ORAL
Qty: 30 TABLET | Refills: 2 | Status: SHIPPED | OUTPATIENT
Start: 2019-05-07 | End: 2019-08-06 | Stop reason: SDUPTHER

## 2019-05-07 RX ORDER — BUPROPION HYDROCHLORIDE 150 MG/1
TABLET ORAL
Qty: 30 TABLET | Refills: 5 | Status: SHIPPED | OUTPATIENT
Start: 2019-05-07 | End: 2019-10-05 | Stop reason: SDUPTHER

## 2019-05-17 ENCOUNTER — CLINICAL SUPPORT (OUTPATIENT)
Dept: FAMILY MEDICINE CLINIC | Facility: CLINIC | Age: 49
End: 2019-05-17

## 2019-05-17 DIAGNOSIS — E29.1 HYPOGONADISM IN MALE: ICD-10-CM

## 2019-05-17 PROCEDURE — 96372 THER/PROPH/DIAG INJ SC/IM: CPT | Performed by: FAMILY MEDICINE

## 2019-05-17 RX ADMIN — TESTOSTERONE CYPIONATE 100 MG: 200 INJECTION, SOLUTION INTRAMUSCULAR at 09:11

## 2019-05-21 ENCOUNTER — OFFICE VISIT (OUTPATIENT)
Dept: FAMILY MEDICINE CLINIC | Facility: CLINIC | Age: 49
End: 2019-05-21

## 2019-05-21 VITALS
BODY MASS INDEX: 44.12 KG/M2 | WEIGHT: 315 LBS | DIASTOLIC BLOOD PRESSURE: 72 MMHG | HEART RATE: 88 BPM | SYSTOLIC BLOOD PRESSURE: 122 MMHG | TEMPERATURE: 98.4 F | OXYGEN SATURATION: 97 %

## 2019-05-21 DIAGNOSIS — I89.0 LYMPHEDEMA: ICD-10-CM

## 2019-05-21 DIAGNOSIS — E11.42 TYPE 2 DIABETES MELLITUS WITH DIABETIC POLYNEUROPATHY, WITHOUT LONG-TERM CURRENT USE OF INSULIN (HCC): Primary | ICD-10-CM

## 2019-05-21 PROCEDURE — 99213 OFFICE O/P EST LOW 20 MIN: CPT | Performed by: FAMILY MEDICINE

## 2019-05-21 NOTE — PROGRESS NOTES
Subjective   Marcial Desai is a 49 y.o. male. Presents today for   Chief Complaint   Patient presents with   • Follow-up     rt leg from knee to toes doing some better       History of Present Illness  Patient here for follow-up of dm2;  Doing better with weight loss progressively and lost more;  Notes tingling in finger tips.  Due fo ra1c;   Reports missed several tesotseron injections;  No cp/soa;    Reports rt leg pain resolved;  Not certain what did.  Since last seen obtained disability.    Face to Face for lymphedema, will need stockings, braces from [facility];  Helps with swelling.    Review of Systems   Cardiovascular: Positive for leg swelling.   Neurological: Positive for numbness.       Patient Active Problem List   Diagnosis   • Ankle pain   • Atopic dermatitis   • Benign essential hypertension   • Type 2 diabetes mellitus with diabetic polyneuropathy, without long-term current use of insulin (CMS/[facility]CC)   • Diabetic peripheral neuropathy (CMS/HCC)   • Abnormal liver function tests   • Idiopathic chronic gout of multiple sites without tophus   • Hypertriglyceridemia   • Cramps of lower extremity   • Edema of lower extremity   • Lymphedema   • Osteoarthritis   • Morbid obesity due to excess calories (CMS/[facility])   • Hypogonadism in male       Social History     Socioeconomic History   • Marital status:      Spouse name: Not on file   • Number of children: Not on file   • Years of education: Not on file   • Highest education level: Not on file   Tobacco Use   • Smoking status: Never Smoker   • Smokeless tobacco: Never Used   Substance and Sexual Activity   • Alcohol use: No   • Drug use: No       Allergies   Allergen Reactions   • Cortisone Shortness Of Breath       Current Outpatient Medications on File Prior to Visit   Medication Sig Dispense Refill   • ACCU-CHEK TYREL PLUS test strip TEST DAILY 50 each 2   • allopurinol (ZYLOPRIM) 300 MG tablet TAKE ONE TABLET BY MOUTH TWICE A DAY 60 tablet 5   •  atorvastatin (LIPITOR) 20 MG tablet TAKE ONE TABLET BY MOUTH EVERY NIGHT 30 tablet 11   • Blood Glucose Monitoring Suppl (ACCU-CHEK TYREL PLUS) w/Device kit      • buPROPion XL (WELLBUTRIN XL) 150 MG 24 hr tablet TAKE ONE TABLET BY MOUTH DAILY 30 tablet 5   • cetirizine (zyrTEC) 10 MG tablet TAKE ONE TABLET BY MOUTH DAILY 30 tablet 2   • cyclobenzaprine (FLEXERIL) 10 MG tablet Take 1 tablet by mouth 3 (Three) Times a Day As Needed for Muscle Spasms. 15 tablet 0   • Dulaglutide (TRULICITY) 1.5 MG/0.5ML solution pen-injector Inject 1.5 mg under the skin into the appropriate area as directed 1 (One) Time Per Week. 2 mL 3   • Empagliflozin (JARDIANCE) 25 MG tablet Take 25 mg by mouth Daily. 30 tablet 5   • fluticasone (FLONASE) 50 MCG/ACT nasal spray PLACE 2 SPRAYS IN EACH NOSTRIL DAILY 16 g 0   • furosemide (LASIX) 40 MG tablet TAKE ONE TABLET BY MOUTH DAILY 30 tablet 11   • gabapentin (NEURONTIN) 400 MG capsule Take 1 capsule by mouth 3 (Three) Times a Day. 90 capsule 5   • glucose blood test strip Check once daily E11.9 non-iddm;  States [employer] covered;  Also disp alcohol swabs, lancets. 50 each 12   • glucose monitor monitoring kit Check once daily d x E11.9 non-iddm 1 each 0   • HYDROcodone-acetaminophen (NORCO) 5-325 MG per tablet Take 1 tablet by mouth Every 6 (Six) Hours As Needed for Severe Pain . 12 tablet 0   • hydrocortisone 2.5 % cream Apply  topically 2 (two) times a day. Wipe bottom twice daily 28 g 0   •  MG tablet TAKE ONE TABLET BY MOUTH EVERY 8 HOURS AS NEEDED FOR MILD OR MODERATE PAIN 90 tablet 11   • Lancets misc 1 Device Daily. Dispense brand covered by insurance. Dx. e11.9 100 each 12   • lisinopril (PRINIVIL,ZESTRIL) 10 MG tablet TAKE ONE TABLET BY MOUTH DAILY 30 tablet 4   • magnesium oxide (MAGOX) 400 (241.3 Mg) MG tablet tablet TAKE ONE TABLET BY MOUTH DAILY 13 tablet 0   • mupirocin (BACTROBAN) 2 % ointment APPLY TO AFFECTED AREA(S) TWO TIMES A DAY 22 g 1   • potassium chloride  (K-DUR,KLOR-CON) 10 MEQ CR tablet TAKE THREE TABLETS BY MOUTH TWICE A DAY 54 tablet 0   • sildenafil (VIAGRA) 100 MG tablet 1/2 to 1 po daily prn ED 10 tablet 5   • spironolactone (ALDACTONE) 25 MG tablet Take 1 tablet by mouth Daily. 30 tablet 11   • triamcinolone (KENALOG) 0.1 % cream APPLY A THIN LAYER TO AFFECTED AREA TWO TIMES A DAY 30 g 2   • TRULICITY 1.5 MG/0.5ML solution pen-injector INJECT 1.5MG UNDER THE SKIN INTO THE APPROPRIATE AREA AS DIRECTED EVERY 7 DAYS 2 mL 2   • [DISCONTINUED] metFORMIN (GLUCOPHAGE) 1000 MG tablet TAKE ONE TABLET BY MOUTH EVERY MORNING WITH BREAKFAST 30 tablet 9     Current Facility-Administered Medications on File Prior to Visit   Medication Dose Route Frequency Provider Last Rate Last Dose   • Testosterone Cypionate (DEPOTESTOTERONE CYPIONATE) injection 100 mg  100 mg Intramuscular Q14 Days Tesfaye Swartz, DO   100 mg at 05/17/19 0911       Objective   Vitals:    05/21/19 0838   BP: 122/72   Pulse: 88   Temp: 98.4 °F (36.9 °C)   SpO2: 97%   Weight: (!) 160 kg (353 lb)       Physical Exam   Constitutional: He appears well-developed and well-nourished.   Neck: Neck supple.   Musculoskeletal: He exhibits edema (lymphedema).   Neurological: He is alert.   Skin: Skin is warm and dry.   Psychiatric: He has a normal mood and affect. His behavior is normal.   Nursing note and vitals reviewed.      Assessment/Plan   Marcial was seen today for follow-up.    Diagnoses and all orders for this visit:    Type 2 diabetes mellitus with diabetic polyneuropathy, without long-term current use of insulin (CMS/[facility])  -     metFORMIN (GLUCOPHAGE) 1000 MG tablet; 1500mg am and 1000mg pm  -     Hemoglobin A1c    Lymphedema        Continue stockings, bracing for lymphedema;  Gave Rx;  Without has severe swelling, this helps to some extent to be more managable.  Will tweak metfomrin to 1500mg and 1000mg to max to see if drive down bs further as having some neuropathy symptoms       -Follow up: 3 months  and prn

## 2019-05-22 LAB — HBA1C MFR BLD: 6.8 % (ref 4.8–5.6)

## 2019-06-04 ENCOUNTER — CLINICAL SUPPORT (OUTPATIENT)
Dept: FAMILY MEDICINE CLINIC | Facility: CLINIC | Age: 49
End: 2019-06-04

## 2019-06-04 DIAGNOSIS — E29.1 HYPOGONADISM IN MALE: ICD-10-CM

## 2019-06-04 PROCEDURE — 96372 THER/PROPH/DIAG INJ SC/IM: CPT | Performed by: FAMILY MEDICINE

## 2019-06-04 RX ADMIN — TESTOSTERONE CYPIONATE 100 MG: 200 INJECTION, SOLUTION INTRAMUSCULAR at 07:43

## 2019-06-19 RX ORDER — GABAPENTIN 400 MG/1
400 CAPSULE ORAL 3 TIMES DAILY
Qty: 90 CAPSULE | Refills: 5 | Status: SHIPPED | OUTPATIENT
Start: 2019-06-19 | End: 2020-05-04

## 2019-06-19 RX ORDER — GABAPENTIN 400 MG/1
CAPSULE ORAL
Qty: 90 CAPSULE | Refills: 3 | OUTPATIENT
Start: 2019-06-19

## 2019-06-25 ENCOUNTER — CLINICAL SUPPORT (OUTPATIENT)
Dept: FAMILY MEDICINE CLINIC | Facility: CLINIC | Age: 49
End: 2019-06-25

## 2019-06-25 DIAGNOSIS — E78.1 HYPERTRIGLYCERIDEMIA: ICD-10-CM

## 2019-06-25 DIAGNOSIS — E11.42 TYPE 2 DIABETES MELLITUS WITH DIABETIC POLYNEUROPATHY, WITHOUT LONG-TERM CURRENT USE OF INSULIN (HCC): ICD-10-CM

## 2019-06-25 DIAGNOSIS — E29.1 HYPOGONADISM IN MALE: ICD-10-CM

## 2019-06-25 PROCEDURE — 96372 THER/PROPH/DIAG INJ SC/IM: CPT | Performed by: FAMILY MEDICINE

## 2019-06-25 RX ORDER — ATORVASTATIN CALCIUM 20 MG/1
20 TABLET, FILM COATED ORAL
Qty: 90 TABLET | Refills: 3 | Status: SHIPPED | OUTPATIENT
Start: 2019-06-25 | End: 2020-04-03

## 2019-06-25 RX ADMIN — TESTOSTERONE CYPIONATE 100 MG: 200 INJECTION, SOLUTION INTRAMUSCULAR at 12:20

## 2019-06-26 DIAGNOSIS — E11.42 TYPE 2 DIABETES MELLITUS WITH DIABETIC POLYNEUROPATHY, WITHOUT LONG-TERM CURRENT USE OF INSULIN (HCC): ICD-10-CM

## 2019-06-26 DIAGNOSIS — E66.01 MORBID OBESITY DUE TO EXCESS CALORIES (HCC): ICD-10-CM

## 2019-06-26 RX ORDER — LISINOPRIL 10 MG/1
TABLET ORAL
Qty: 30 TABLET | Refills: 5 | Status: SHIPPED | OUTPATIENT
Start: 2019-06-26 | End: 2019-12-04 | Stop reason: SDUPTHER

## 2019-06-26 RX ORDER — FUROSEMIDE 40 MG/1
40 TABLET ORAL DAILY
Qty: 30 TABLET | Refills: 5 | Status: SHIPPED | OUTPATIENT
Start: 2019-06-26 | End: 2020-01-30

## 2019-06-26 RX ORDER — DULAGLUTIDE 1.5 MG/.5ML
INJECTION, SOLUTION SUBCUTANEOUS
Qty: 0.5 ML | Refills: 5 | Status: SHIPPED | OUTPATIENT
Start: 2019-06-26 | End: 2020-06-04

## 2019-08-06 DIAGNOSIS — M1A.09X0 IDIOPATHIC CHRONIC GOUT OF MULTIPLE SITES WITHOUT TOPHUS: ICD-10-CM

## 2019-08-06 DIAGNOSIS — J00 ACUTE NASOPHARYNGITIS: ICD-10-CM

## 2019-08-06 RX ORDER — IBUPROFEN 800 MG/1
TABLET ORAL
Qty: 90 TABLET | Refills: 1 | Status: SHIPPED | OUTPATIENT
Start: 2019-08-06 | End: 2019-10-05 | Stop reason: SDUPTHER

## 2019-08-06 RX ORDER — CETIRIZINE HYDROCHLORIDE 10 MG/1
TABLET ORAL
Qty: 30 TABLET | Refills: 1 | Status: SHIPPED | OUTPATIENT
Start: 2019-08-06 | End: 2019-10-05 | Stop reason: SDUPTHER

## 2019-08-22 ENCOUNTER — OFFICE VISIT (OUTPATIENT)
Dept: FAMILY MEDICINE CLINIC | Facility: CLINIC | Age: 49
End: 2019-08-22

## 2019-08-22 VITALS
BODY MASS INDEX: 45.25 KG/M2 | SYSTOLIC BLOOD PRESSURE: 140 MMHG | TEMPERATURE: 98.1 F | OXYGEN SATURATION: 95 % | WEIGHT: 315 LBS | HEART RATE: 88 BPM | DIASTOLIC BLOOD PRESSURE: 78 MMHG

## 2019-08-22 DIAGNOSIS — E11.42 DIABETIC PERIPHERAL NEUROPATHY (HCC): ICD-10-CM

## 2019-08-22 DIAGNOSIS — E78.1 HYPERTRIGLYCERIDEMIA: ICD-10-CM

## 2019-08-22 DIAGNOSIS — I10 BENIGN ESSENTIAL HYPERTENSION: ICD-10-CM

## 2019-08-22 DIAGNOSIS — E11.42 TYPE 2 DIABETES MELLITUS WITH DIABETIC POLYNEUROPATHY, WITHOUT LONG-TERM CURRENT USE OF INSULIN (HCC): Primary | ICD-10-CM

## 2019-08-22 DIAGNOSIS — E29.1 HYPOGONADISM IN MALE: ICD-10-CM

## 2019-08-22 DIAGNOSIS — R19.6 HALITOSIS: ICD-10-CM

## 2019-08-22 DIAGNOSIS — I89.0 LYMPHEDEMA: ICD-10-CM

## 2019-08-22 LAB
POC CREATININE URINE: 50
POC MICROALBUMIN URINE: 10

## 2019-08-22 PROCEDURE — 82044 UR ALBUMIN SEMIQUANTITATIVE: CPT | Performed by: FAMILY MEDICINE

## 2019-08-22 PROCEDURE — 99214 OFFICE O/P EST MOD 30 MIN: CPT | Performed by: FAMILY MEDICINE

## 2019-08-22 RX ORDER — TESTOSTERONE 40.5 MG/2.5G
GEL TOPICAL
Qty: 75 G | Refills: 3 | Status: SHIPPED | OUTPATIENT
Start: 2019-08-22 | End: 2020-03-26

## 2019-08-22 RX ORDER — CHLORHEXIDINE GLUCONATE 0.12 MG/ML
15 RINSE ORAL 2 TIMES DAILY
Qty: 473 ML | Refills: 12 | Status: SHIPPED | OUTPATIENT
Start: 2019-08-22 | End: 2021-09-14

## 2019-08-22 NOTE — PROGRESS NOTES
Subjective   Marcial Desai is a 49 y.o. male. Presents today for   Chief Complaint   Patient presents with   • Follow-up     diabetic and testosterone check ; lymphedema   • Hyperlipidemia   • Hypertension   • Diabetes   • Hypogonadism       Diabetes   He presents for his follow-up diabetic visit. He has type 2 diabetes mellitus. His disease course has been stable. Associated symptoms include foot paresthesias. Pertinent negatives for diabetes include no chest pain and no foot ulcerations. Symptoms are stable. Diabetic complications include peripheral neuropathy. Pertinent negatives for diabetic complications include no CVA. Risk factors for coronary artery disease include dyslipidemia, diabetes mellitus, hypertension, male sex and obesity. He is compliant with treatment all of the time. He is following a generally healthy diet. An ACE inhibitor/angiotensin II receptor blocker is being taken.   Hypertension   This is a chronic problem. The current episode started more than 1 year ago. The problem is unchanged. The problem is controlled. Associated symptoms include peripheral edema (Has severe lymphedema;  going back soon for recheck;  has been to lymphedema clinic;  Has varicose veins worsneing as well.  To see vascular soon). Pertinent negatives include no chest pain, orthopnea, palpitations, PND or shortness of breath. (Has halitosis) There are no associated agents to hypertension. Risk factors for coronary artery disease include diabetes mellitus, dyslipidemia, male gender and obesity. Past treatments include ACE inhibitors. Current antihypertension treatment includes ACE inhibitors. The current treatment provides moderate improvement. There are no compliance problems.  There is no history of kidney disease, CAD/MI, CVA or heart failure. There is no history of chronic renal disease.   Hyperlipidemia   This is a chronic problem. The current episode started more than 1 year ago. The problem is controlled. Recent  lipid tests were reviewed and are normal. Exacerbating diseases include diabetes and obesity. He has no history of chronic renal disease. Associated symptoms include leg pain. Pertinent negatives include no chest pain or shortness of breath. Current antihyperlipidemic treatment includes statins. The current treatment provides moderate improvement of lipids. There are no compliance problems.  Risk factors for coronary artery disease include diabetes mellitus, dyslipidemia, hypertension, male sex and obesity.   Male  Problem   Primary symptoms comment: Hypogonadism - off testosterone as insurance change.. This is a chronic problem. The pain is medium. Pertinent negatives include no chest pain or shortness of breath. Treatments tried: Testosterone helped, but off;  has new insurance and would like topical again.       Review of Systems   Respiratory: Negative for shortness of breath.    Cardiovascular: Negative for chest pain, palpitations, orthopnea and PND.       Patient Active Problem List   Diagnosis   • Ankle pain   • Atopic dermatitis   • Benign essential hypertension   • Type 2 diabetes mellitus with diabetic polyneuropathy, without long-term current use of insulin (CMS/HCC)   • Diabetic peripheral neuropathy (CMS/HCC)   • Abnormal liver function tests   • Idiopathic chronic gout of multiple sites without tophus   • Hypertriglyceridemia   • Cramps of lower extremity   • Edema of lower extremity   • Lymphedema   • Osteoarthritis   • Morbid obesity due to excess calories (CMS/HCC)   • Hypogonadism in male       Social History     Socioeconomic History   • Marital status:      Spouse name: Not on file   • Number of children: Not on file   • Years of education: Not on file   • Highest education level: Not on file   Tobacco Use   • Smoking status: Never Smoker   • Smokeless tobacco: Never Used   Substance and Sexual Activity   • Alcohol use: No   • Drug use: No       Allergies   Allergen Reactions   •  Cortisone Shortness Of Breath       Current Outpatient Medications on File Prior to Visit   Medication Sig Dispense Refill   • ACCU-CHEK TYREL PLUS test strip TEST DAILY 50 each 2   • allopurinol (ZYLOPRIM) 300 MG tablet TAKE ONE TABLET BY MOUTH TWICE A DAY 60 tablet 5   • atorvastatin (LIPITOR) 20 MG tablet Take 1 tablet by mouth every night at bedtime. 90 tablet 3   • Blood Glucose Monitoring Suppl (ACCU-CHEK TYREL PLUS) w/Device kit      • buPROPion XL (WELLBUTRIN XL) 150 MG 24 hr tablet TAKE ONE TABLET BY MOUTH DAILY 30 tablet 5   • cetirizine (zyrTEC) 10 MG tablet TAKE ONE TABLET BY MOUTH DAILY 30 tablet 1   • cyclobenzaprine (FLEXERIL) 10 MG tablet Take 1 tablet by mouth 3 (Three) Times a Day As Needed for Muscle Spasms. 15 tablet 0   • Empagliflozin (JARDIANCE) 25 MG tablet Take 25 mg by mouth Daily. 30 tablet 5   • fluticasone (FLONASE) 50 MCG/ACT nasal spray PLACE 2 SPRAYS IN EACH NOSTRIL DAILY 16 g 0   • furosemide (LASIX) 40 MG tablet Take 1 tablet by mouth Daily. 30 tablet 5   • gabapentin (NEURONTIN) 400 MG capsule Take 1 capsule by mouth 3 (Three) Times a Day. 90 capsule 5   • glucose blood test strip Check once daily E11.9 non-iddm;  States [employer] covered;  Also disp alcohol swabs, lancets. 50 each 12   • glucose monitor monitoring kit Check once daily d x E11.9 non-iddm 1 each 0   • HYDROcodone-acetaminophen (NORCO) 5-325 MG per tablet Take 1 tablet by mouth Every 6 (Six) Hours As Needed for Severe Pain . 12 tablet 0   • hydrocortisone 2.5 % cream Apply  topically 2 (two) times a day. Wipe bottom twice daily 28 g 0   • ibuprofen (ADVIL,MOTRIN) 800 MG tablet TAKE ONE TABLET BY MOUTH EVERY 8 HOURS AS NEEDED FOR MILD OR MODERATE PAIN 90 tablet 1   • Lancets misc 1 Device Daily. Dispense brand covered by insurance. Dx. e11.9 100 each 12   • lisinopril (PRINIVIL,ZESTRIL) 10 MG tablet TAKE ONE TABLET BY MOUTH DAILY 30 tablet 5   • magnesium oxide (MAGOX) 400 (241.3 Mg) MG tablet tablet TAKE ONE  TABLET BY MOUTH DAILY 13 tablet 0   • metFORMIN (GLUCOPHAGE) 1000 MG tablet 1500mg am and 1000mg pm 235 tablet 3   • mupirocin (BACTROBAN) 2 % ointment APPLY TO AFFECTED AREA(S) TWO TIMES A DAY 22 g 1   • potassium chloride (K-DUR,KLOR-CON) 10 MEQ CR tablet TAKE THREE TABLETS BY MOUTH TWICE A DAY 54 tablet 0   • sildenafil (VIAGRA) 100 MG tablet 1/2 to 1 po daily prn ED 10 tablet 5   • spironolactone (ALDACTONE) 25 MG tablet Take 1 tablet by mouth Daily. 30 tablet 11   • triamcinolone (KENALOG) 0.1 % cream APPLY A THIN LAYER TO AFFECTED AREA TWO TIMES A DAY 30 g 2   • TRULICITY 1.5 MG/0.5ML solution pen-injector INJECT 1.5 MG UNDER THE SKIN ONCE WEEKLY 0.5 mL 5     Current Facility-Administered Medications on File Prior to Visit   Medication Dose Route Frequency Provider Last Rate Last Dose   • Testosterone Cypionate (DEPOTESTOTERONE CYPIONATE) injection 100 mg  100 mg Intramuscular Q14 Days Tesfaye Swartz DO   100 mg at 06/25/19 1220       Objective   Vitals:    08/22/19 0856   BP: 140/78   Pulse: 88   Temp: 98.1 °F (36.7 °C)   SpO2: 95%   Weight: (!) 164 kg (362 lb)       Physical Exam   Constitutional: He appears well-developed and well-nourished.   HENT:   Head: Normocephalic and atraumatic.   Neck: Neck supple. No JVD present. No thyromegaly present.   Cardiovascular: Normal rate, regular rhythm and normal heart sounds. Exam reveals no gallop and no friction rub.   No murmur heard.  Pulmonary/Chest: Effort normal and breath sounds normal. No respiratory distress. He has no wheezes. He has no rales.   Abdominal: Soft. Bowel sounds are normal. He exhibits no distension. There is no tenderness. There is no rebound and no guarding.   Musculoskeletal: He exhibits edema (lymphedema, varicose veins right thigh).   Neurological: He is alert.   Skin: Skin is warm and dry.   Psychiatric: He has a normal mood and affect. His behavior is normal.   Nursing note and vitals reviewed.    MAL/Cr ordered and  reviewed.    Assessment/Plan      Marcial was seen today for follow-up, hyperlipidemia, hypertension, diabetes and hypogonadism.    Diagnoses and all orders for this visit:    Type 2 diabetes mellitus with diabetic polyneuropathy, without long-term current use of insulin (CMS/[facility])  -     POCT microalbumin  -     Hemoglobin A1c  -     Lipid Panel  -     Comprehensive Metabolic Panel  -     Testosterone  -     PSA DIAGNOSTIC  -     CBC & Differential    Diabetic peripheral neuropathy (CMS/HCC)    Hypertriglyceridemia  -     Hemoglobin A1c  -     Lipid Panel  -     Comprehensive Metabolic Panel  -     Testosterone  -     PSA DIAGNOSTIC  -     CBC & Differential    Benign essential hypertension  -     Hemoglobin A1c  -     Lipid Panel  -     Comprehensive Metabolic Panel  -     Testosterone  -     PSA DIAGNOSTIC  -     CBC & Differential    Lymphedema    Hypogonadism in male  -     Hemoglobin A1c  -     Lipid Panel  -     Comprehensive Metabolic Panel  -     Testosterone  -     PSA DIAGNOSTIC  -     CBC & Differential  -     Testosterone (ANDROGEL) 40.5 MG/2.5GM (1.62%) gel; 1 application daily    Halitosis  -     chlorhexidine (PERIDEX) 0.12 % solution; Apply 15 mL to the mouth or throat 2 (Two) Times a Day. Swish, gargle and spit    -dm2- back on medications;  Continue and recheck labs  -check baseline tstosteron again and will see if topical covered;    -lymphedema f/u with vascular clinic, continue compression wraps  -hypertension - controlled, continue medications  -hypertg - continue medication, dm2 diet and recheck labs  -halitosis reported, has tried mouth wash and brushing, sees dentist regularly;  Will try peridex         -Follow up: 6 months and prn

## 2019-08-23 LAB
ALBUMIN SERPL-MCNC: 4.9 G/DL (ref 3.5–5.2)
ALBUMIN/GLOB SERPL: 2 G/DL
ALP SERPL-CCNC: 91 U/L (ref 39–117)
ALT SERPL-CCNC: 26 U/L (ref 1–41)
AST SERPL-CCNC: 26 U/L (ref 1–40)
BASOPHILS # BLD AUTO: 0.03 10*3/MM3 (ref 0–0.2)
BASOPHILS NFR BLD AUTO: 0.5 % (ref 0–1.5)
BILIRUB SERPL-MCNC: 0.7 MG/DL (ref 0.2–1.2)
BUN SERPL-MCNC: 11 MG/DL (ref 6–20)
BUN/CREAT SERPL: 10.8 (ref 7–25)
CALCIUM SERPL-MCNC: 9.4 MG/DL (ref 8.6–10.5)
CHLORIDE SERPL-SCNC: 94 MMOL/L (ref 98–107)
CHOLEST SERPL-MCNC: 116 MG/DL (ref 0–200)
CO2 SERPL-SCNC: 27.1 MMOL/L (ref 22–29)
CREAT SERPL-MCNC: 1.02 MG/DL (ref 0.76–1.27)
EOSINOPHIL # BLD AUTO: 0.09 10*3/MM3 (ref 0–0.4)
EOSINOPHIL NFR BLD AUTO: 1.5 % (ref 0.3–6.2)
ERYTHROCYTE [DISTWIDTH] IN BLOOD BY AUTOMATED COUNT: 14.5 % (ref 12.3–15.4)
GLOBULIN SER CALC-MCNC: 2.5 GM/DL
GLUCOSE SERPL-MCNC: 122 MG/DL (ref 65–99)
HBA1C MFR BLD: 6.8 % (ref 4.8–5.6)
HCT VFR BLD AUTO: 49.6 % (ref 37.5–51)
HDLC SERPL-MCNC: 32 MG/DL (ref 40–60)
HGB BLD-MCNC: 15.8 G/DL (ref 13–17.7)
IMM GRANULOCYTES # BLD AUTO: 0.02 10*3/MM3 (ref 0–0.05)
IMM GRANULOCYTES NFR BLD AUTO: 0.3 % (ref 0–0.5)
LDLC SERPL CALC-MCNC: 41 MG/DL (ref 0–100)
LYMPHOCYTES # BLD AUTO: 1.73 10*3/MM3 (ref 0.7–3.1)
LYMPHOCYTES NFR BLD AUTO: 29 % (ref 19.6–45.3)
MCH RBC QN AUTO: 28.9 PG (ref 26.6–33)
MCHC RBC AUTO-ENTMCNC: 31.9 G/DL (ref 31.5–35.7)
MCV RBC AUTO: 90.7 FL (ref 79–97)
MONOCYTES # BLD AUTO: 0.49 10*3/MM3 (ref 0.1–0.9)
MONOCYTES NFR BLD AUTO: 8.2 % (ref 5–12)
NEUTROPHILS # BLD AUTO: 3.6 10*3/MM3 (ref 1.7–7)
NEUTROPHILS NFR BLD AUTO: 60.5 % (ref 42.7–76)
NRBC BLD AUTO-RTO: 0 /100 WBC (ref 0–0.2)
PLATELET # BLD AUTO: 156 10*3/MM3 (ref 140–450)
POTASSIUM SERPL-SCNC: 3.8 MMOL/L (ref 3.5–5.2)
PROT SERPL-MCNC: 7.4 G/DL (ref 6–8.5)
PSA SERPL-MCNC: 0.47 NG/ML (ref 0–4)
RBC # BLD AUTO: 5.47 10*6/MM3 (ref 4.14–5.8)
SODIUM SERPL-SCNC: 137 MMOL/L (ref 136–145)
TESTOST SERPL-MCNC: 229 NG/DL (ref 264–916)
TRIGL SERPL-MCNC: 217 MG/DL (ref 0–150)
VLDLC SERPL CALC-MCNC: 43.4 MG/DL
WBC # BLD AUTO: 5.96 10*3/MM3 (ref 3.4–10.8)

## 2019-08-25 NOTE — PROGRESS NOTES
Diabetes is adequately controlled.  Cholesterol is adequately controlled.  Triglycerides mildly elevated, work on diet.  Testosterone low, restart as discussed.  Rest of labs normal or stable.

## 2019-09-05 RX ORDER — ERTUGLIFLOZIN 15 MG/1
TABLET, FILM COATED ORAL
Qty: 30 TABLET | Refills: 4 | OUTPATIENT
Start: 2019-09-05

## 2019-09-10 RX ORDER — ERTUGLIFLOZIN 15 MG/1
TABLET, FILM COATED ORAL
Qty: 30 TABLET | Refills: 4 | OUTPATIENT
Start: 2019-09-10

## 2019-10-05 DIAGNOSIS — J00 ACUTE NASOPHARYNGITIS: ICD-10-CM

## 2019-10-05 DIAGNOSIS — F32.2 SEVERE SINGLE CURRENT EPISODE OF MAJOR DEPRESSIVE DISORDER, WITHOUT PSYCHOTIC FEATURES (HCC): ICD-10-CM

## 2019-10-05 DIAGNOSIS — M1A.09X0 IDIOPATHIC CHRONIC GOUT OF MULTIPLE SITES WITHOUT TOPHUS: ICD-10-CM

## 2019-10-07 RX ORDER — CETIRIZINE HYDROCHLORIDE 10 MG/1
TABLET ORAL
Qty: 30 TABLET | Refills: 0 | Status: SHIPPED | OUTPATIENT
Start: 2019-10-07 | End: 2019-11-04 | Stop reason: SDUPTHER

## 2019-10-07 RX ORDER — IBUPROFEN 800 MG/1
TABLET ORAL
Qty: 90 TABLET | Refills: 0 | Status: SHIPPED | OUTPATIENT
Start: 2019-10-07 | End: 2019-11-04 | Stop reason: SDUPTHER

## 2019-10-07 RX ORDER — BUPROPION HYDROCHLORIDE 150 MG/1
TABLET ORAL
Qty: 30 TABLET | Refills: 4 | Status: SHIPPED | OUTPATIENT
Start: 2019-10-07 | End: 2020-10-09

## 2019-10-15 RX ORDER — ERTUGLIFLOZIN 15 MG/1
TABLET, FILM COATED ORAL
Qty: 30 TABLET | Refills: 4 | OUTPATIENT
Start: 2019-10-15

## 2019-10-24 ENCOUNTER — TELEPHONE (OUTPATIENT)
Dept: FAMILY MEDICINE CLINIC | Facility: CLINIC | Age: 49
End: 2019-10-24

## 2019-10-24 RX ORDER — AMOXICILLIN 500 MG/1
500 CAPSULE ORAL 3 TIMES DAILY
Qty: 30 CAPSULE | Refills: 0 | Status: SHIPPED | OUTPATIENT
Start: 2019-10-24 | End: 2020-05-13

## 2019-11-04 DIAGNOSIS — J00 ACUTE NASOPHARYNGITIS: ICD-10-CM

## 2019-11-04 DIAGNOSIS — M1A.09X0 IDIOPATHIC CHRONIC GOUT OF MULTIPLE SITES WITHOUT TOPHUS: ICD-10-CM

## 2019-11-04 RX ORDER — CETIRIZINE HYDROCHLORIDE 10 MG/1
TABLET ORAL
Qty: 30 TABLET | Refills: 3 | Status: SHIPPED | OUTPATIENT
Start: 2019-11-04 | End: 2021-09-14 | Stop reason: SDUPTHER

## 2019-11-04 RX ORDER — IBUPROFEN 800 MG/1
TABLET ORAL
Qty: 90 TABLET | Refills: 0 | Status: SHIPPED | OUTPATIENT
Start: 2019-11-04 | End: 2019-12-04 | Stop reason: SDUPTHER

## 2019-12-04 DIAGNOSIS — M1A.09X0 IDIOPATHIC CHRONIC GOUT OF MULTIPLE SITES WITHOUT TOPHUS: ICD-10-CM

## 2019-12-04 RX ORDER — LISINOPRIL 10 MG/1
TABLET ORAL
Qty: 30 TABLET | Refills: 4 | Status: SHIPPED | OUTPATIENT
Start: 2019-12-04 | End: 2020-03-23

## 2019-12-04 RX ORDER — IBUPROFEN 800 MG/1
TABLET ORAL
Qty: 90 TABLET | Refills: 0 | Status: SHIPPED | OUTPATIENT
Start: 2019-12-04 | End: 2020-01-06

## 2020-01-04 DIAGNOSIS — M1A.09X0 IDIOPATHIC CHRONIC GOUT OF MULTIPLE SITES WITHOUT TOPHUS: ICD-10-CM

## 2020-01-06 RX ORDER — IBUPROFEN 800 MG/1
TABLET ORAL
Qty: 90 TABLET | Refills: 0 | Status: SHIPPED | OUTPATIENT
Start: 2020-01-06 | End: 2020-08-07

## 2020-01-30 RX ORDER — FUROSEMIDE 40 MG/1
TABLET ORAL
Qty: 30 TABLET | Refills: 4 | Status: SHIPPED | OUTPATIENT
Start: 2020-01-30 | End: 2020-07-06

## 2020-03-03 ENCOUNTER — OFFICE VISIT (OUTPATIENT)
Dept: FAMILY MEDICINE CLINIC | Facility: CLINIC | Age: 50
End: 2020-03-03

## 2020-03-03 VITALS
SYSTOLIC BLOOD PRESSURE: 150 MMHG | DIASTOLIC BLOOD PRESSURE: 80 MMHG | HEART RATE: 82 BPM | HEIGHT: 75 IN | OXYGEN SATURATION: 97 % | TEMPERATURE: 98.1 F | WEIGHT: 315 LBS | BODY MASS INDEX: 39.17 KG/M2

## 2020-03-03 DIAGNOSIS — E29.1 HYPOGONADISM IN MALE: ICD-10-CM

## 2020-03-03 DIAGNOSIS — I89.0 LYMPHEDEMA: ICD-10-CM

## 2020-03-03 DIAGNOSIS — I10 BENIGN ESSENTIAL HYPERTENSION: ICD-10-CM

## 2020-03-03 DIAGNOSIS — N40.0 ENLARGED PROSTATE: ICD-10-CM

## 2020-03-03 DIAGNOSIS — L84 PRE-ULCERATIVE CORN OR CALLOUS: ICD-10-CM

## 2020-03-03 DIAGNOSIS — J06.9 ACUTE URI: ICD-10-CM

## 2020-03-03 DIAGNOSIS — E78.1 HYPERTRIGLYCERIDEMIA: ICD-10-CM

## 2020-03-03 DIAGNOSIS — E11.42 TYPE 2 DIABETES MELLITUS WITH DIABETIC POLYNEUROPATHY, WITHOUT LONG-TERM CURRENT USE OF INSULIN (HCC): Primary | ICD-10-CM

## 2020-03-03 PROCEDURE — 99214 OFFICE O/P EST MOD 30 MIN: CPT | Performed by: FAMILY MEDICINE

## 2020-03-03 RX ORDER — UREA 40 %
CREAM (GRAM) TOPICAL
Qty: 60 G | Refills: 5 | Status: SHIPPED | OUTPATIENT
Start: 2020-03-03 | End: 2021-03-11

## 2020-03-03 NOTE — PROGRESS NOTES
Subjective   Marcial Desai is a 50 y.o. male. Presents today for   Chief Complaint   Patient presents with   • Follow-up     med check with diabetic labs to include diabetic foot exam   • Diabetes   • Hyperlipidemia   • Hypertension   • Edema   • Toe Pain       Diabetes   He presents for his follow-up diabetic visit. He has type 2 diabetes mellitus. His disease course has been stable. Associated symptoms include foot paresthesias. Pertinent negatives for diabetes include no chest pain and no foot ulcerations. (Right 2nd toe has callous starting to ulcerate;  ) Risk factors for coronary artery disease include dyslipidemia, diabetes mellitus, hypertension, male sex and obesity. An ACE inhibitor/angiotensin II receptor blocker is being taken.   Hyperlipidemia   This is a chronic problem. The current episode started more than 1 year ago. The problem is controlled. Recent lipid tests were reviewed and are normal. Exacerbating diseases include diabetes and obesity. Pertinent negatives include no chest pain or shortness of breath. The current treatment provides moderate improvement of lipids.   Hypertension   This is a chronic problem. The current episode started more than 1 year ago. The problem is unchanged. The problem is controlled. Pertinent negatives include no chest pain, orthopnea, palpitations, peripheral edema, PND or shortness of breath. Risk factors for coronary artery disease include dyslipidemia, diabetes mellitus and obesity. The current treatment provides moderate improvement.   Toe Pain    The incident occurred more than 1 week ago. The incident occurred at home. Injury mechanism: did drop dryier on otes but healing;  right 2nd toe has callous, starting to ulcerate, toe bends under and rubs on shoe. The pain is present in the right foot. The quality of the pain is described as aching. The pain has been constant since onset. The symptoms are aggravated by weight bearing. Treatments tried: has been working on  slowly to remove.   Leg Swelling   This is a chronic problem. The current episode started more than 1 year ago. The problem occurs constantly. The problem has been waxing and waning. Associated symptoms include coughing and a sore throat. Pertinent negatives include no chest pain, nausea or vomiting. Exacerbated by: has lymphedema and varicose veins. Treatments tried: exercises, compression wraps, elevation, diuretics and varicsoe vein treatments;  Exercising and walking with weight loss. Improvement on treatment: minimal relief;    URI    This is a new problem. The current episode started yesterday. The problem has been waxing and waning. There has been no fever. Associated symptoms include coughing, rhinorrhea and a sore throat. Pertinent negatives include no chest pain, ear pain, nausea, sinus pain or vomiting. He has tried nothing for the symptoms. The treatment provided no relief.       Review of Systems   HENT: Positive for rhinorrhea and sore throat. Negative for ear pain and sinus pain.    Respiratory: Positive for cough. Negative for shortness of breath.    Cardiovascular: Negative for chest pain, palpitations, orthopnea and PND.   Gastrointestinal: Negative for nausea and vomiting.       Patient Active Problem List   Diagnosis   • Ankle pain   • Atopic dermatitis   • Benign essential hypertension   • Type 2 diabetes mellitus with diabetic polyneuropathy, without long-term current use of insulin (CMS/HCC)   • Diabetic peripheral neuropathy (CMS/HCC)   • Abnormal liver function tests   • Idiopathic chronic gout of multiple sites without tophus   • Hypertriglyceridemia   • Cramps of lower extremity   • Edema of lower extremity   • Lymphedema   • Osteoarthritis   • Morbid obesity due to excess calories (CMS/HCC)   • Hypogonadism in male       Social History     Socioeconomic History   • Marital status:      Spouse name: Not on file   • Number of children: Not on file   • Years of education: Not on file    • Highest education level: Not on file   Tobacco Use   • Smoking status: Never Smoker   • Smokeless tobacco: Never Used   Substance and Sexual Activity   • Alcohol use: No   • Drug use: No       Allergies   Allergen Reactions   • Cortisone Shortness Of Breath       Current Outpatient Medications on File Prior to Visit   Medication Sig Dispense Refill   • ACCU-CHEK TYREL PLUS test strip TEST DAILY 50 each 2   • allopurinol (ZYLOPRIM) 300 MG tablet TAKE ONE TABLET BY MOUTH TWICE A DAY 60 tablet 5   • atorvastatin (LIPITOR) 20 MG tablet Take 1 tablet by mouth every night at bedtime. 90 tablet 3   • Blood Glucose Monitoring Suppl (ACCU-CHEK TYREL PLUS) w/Device kit      • buPROPion XL (WELLBUTRIN XL) 150 MG 24 hr tablet TAKE ONE TABLET BY MOUTH DAILY 30 tablet 4   • cetirizine (zyrTEC) 10 MG tablet TAKE ONE TABLET BY MOUTH DAILY 30 tablet 3   • chlorhexidine (PERIDEX) 0.12 % solution Apply 15 mL to the mouth or throat 2 (Two) Times a Day. Swish, gargle and spit 473 mL 12   • cyclobenzaprine (FLEXERIL) 10 MG tablet Take 1 tablet by mouth 3 (Three) Times a Day As Needed for Muscle Spasms. 15 tablet 0   • Empagliflozin (JARDIANCE) 25 MG tablet Take 25 mg by mouth Daily. 30 tablet 4   • fluticasone (FLONASE) 50 MCG/ACT nasal spray PLACE 2 SPRAYS IN EACH NOSTRIL DAILY 16 g 0   • furosemide (LASIX) 40 MG tablet TAKE ONE TABLET BY MOUTH DAILY 30 tablet 4   • gabapentin (NEURONTIN) 400 MG capsule Take 1 capsule by mouth 3 (Three) Times a Day. 90 capsule 5   • glucose blood test strip Check once daily E11.9 non-iddm;  States [employer] covered;  Also disp alcohol swabs, lancets. 50 each 12   • glucose monitor monitoring kit Check once daily d x E11.9 non-iddm 1 each 0   • HYDROcodone-acetaminophen (NORCO) 5-325 MG per tablet Take 1 tablet by mouth Every 6 (Six) Hours As Needed for Severe Pain . 12 tablet 0   • hydrocortisone 2.5 % cream Apply  topically 2 (two) times a day. Wipe bottom twice daily 28 g 0   • ibuprofen  "(ADVIL,MOTRIN) 800 MG tablet TAKE ONE TABLET BY MOUTH EVERY 8 HOURS AS NEEDED FOR MILD TO MODERATE PAIN 90 tablet 0   • Lancets misc 1 Device Daily. Dispense brand covered by insurance. Dx. e11.9 100 each 12   • lisinopril (PRINIVIL,ZESTRIL) 10 MG tablet TAKE ONE TABLET BY MOUTH DAILY 30 tablet 4   • magnesium oxide (MAGOX) 400 (241.3 Mg) MG tablet tablet TAKE ONE TABLET BY MOUTH DAILY 30 tablet 10   • metFORMIN (GLUCOPHAGE) 1000 MG tablet 1500mg am and 1000mg pm 235 tablet 3   • mupirocin (BACTROBAN) 2 % ointment APPLY TO AFFECTED AREA(S) TWO TIMES A DAY 22 g 1   • potassium chloride (K-DUR,KLOR-CON) 10 MEQ CR tablet TAKE THREE TABLETS BY MOUTH TWICE A DAY 54 tablet 0   • sildenafil (VIAGRA) 100 MG tablet 1/2 to 1 po daily prn ED 10 tablet 5   • spironolactone (ALDACTONE) 25 MG tablet Take 1 tablet by mouth Daily. 30 tablet 11   • Testosterone (ANDROGEL) 40.5 MG/2.5GM (1.62%) gel 1 application daily 75 g 3   • triamcinolone (KENALOG) 0.1 % cream APPLY A THIN LAYER TO AFFECTED AREA TWO TIMES A DAY 30 g 2   • TRULICITY 1.5 MG/0.5ML solution pen-injector INJECT 1.5 MG UNDER THE SKIN ONCE WEEKLY 0.5 mL 5   • amoxicillin (AMOXIL) 500 MG capsule Take 1 capsule by mouth 3 (Three) Times a Day. 30 capsule 0     Current Facility-Administered Medications on File Prior to Visit   Medication Dose Route Frequency Provider Last Rate Last Dose   • Testosterone Cypionate (DEPOTESTOTERONE CYPIONATE) injection 100 mg  100 mg Intramuscular Q14 Days Tesfaye Swartz DO   100 mg at 06/25/19 1220       Objective   Vitals:    03/03/20 0827   BP: 150/80   Pulse: 82   Temp: 98.1 °F (36.7 °C)   SpO2: 97%   Weight: (!) 162 kg (358 lb)   Height: 190.5 cm (75\")       Physical Exam   Constitutional: He appears well-developed and well-nourished.   HENT:   Head: Normocephalic and atraumatic.   Right Ear: Tympanic membrane normal.   Left Ear: Tympanic membrane normal.   Nose: Mucosal edema present.   Mouth/Throat: Posterior oropharyngeal erythema " present.   Neck: Neck supple. No JVD present. No thyromegaly present.   Cardiovascular: Normal rate, regular rhythm and normal heart sounds. Exam reveals no gallop and no friction rub.   No murmur heard.  Pulmonary/Chest: Effort normal and breath sounds normal. No respiratory distress. He has no wheezes. He has no rales.   Abdominal: Soft. Bowel sounds are normal. He exhibits no distension. There is no tenderness. There is no rebound and no guarding.   Musculoskeletal: He exhibits edema (severe lymph edema;  varicose veins).    Marcial had a diabetic foot exam performed (see scanned report) today.   During the foot exam he had a monofilament test performed.  Neurological: He is alert.   Skin: Skin is warm and dry.   Psychiatric: He has a normal mood and affect. His behavior is normal.   Nursing note and vitals reviewed.      Assessment/Plan   Marcial was seen today for follow-up, diabetes, hyperlipidemia, hypertension, edema and toe pain.    Diagnoses and all orders for this visit:    Type 2 diabetes mellitus with diabetic polyneuropathy, without long-term current use of insulin (CMS/[facility])  -     Comprehensive Metabolic Panel  -     Lipid Panel  -     Hemoglobin A1c    Benign essential hypertension  -     Comprehensive Metabolic Panel    Hypertriglyceridemia  -     Lipid Panel    Lymphedema    Hypogonadism in male  -     Comprehensive Metabolic Panel  -     CBC & Differential  -     Testosterone    Pre-ulcerative corn or callous  -     urea (CARMOL) 40 % cream; Apply  topically to the appropriate area as directed Daily. 2nd toe    Enlarged prostate  -     PSA DIAGNOSTIC    Acute URI    -try keratolytic to toe;  Add pad to toe as rubbing toomuch;  Needs dm2 shoes but insurance not covered  -dm2 - continue medications, recheck labs  -HLD - continue statin, recheck lipids.  -hypertension - controlled, continue medications  -lymphedema - patient has been compliant with compression wraps (completed lymphedema clinic, varicose  vein clinic plus), compliant with walking and working on weight loss;  Elevates legs and works on diet, but failed conservative measures.  He would benefit from lymphedema pump.  Will fax order  -uri - fluids, rest;  Call or return if no improvement or worse;  Likely viral, abx not indicated         -Follow up: 6 onths and pnr

## 2020-03-04 LAB
ALBUMIN SERPL-MCNC: 4.3 G/DL (ref 3.5–5.2)
ALBUMIN/GLOB SERPL: 1.6 G/DL
ALP SERPL-CCNC: 93 U/L (ref 39–117)
ALT SERPL-CCNC: 23 U/L (ref 1–41)
AST SERPL-CCNC: 19 U/L (ref 1–40)
BASOPHILS # BLD AUTO: 0.03 10*3/MM3 (ref 0–0.2)
BASOPHILS NFR BLD AUTO: 0.5 % (ref 0–1.5)
BILIRUB SERPL-MCNC: 0.5 MG/DL (ref 0.2–1.2)
BUN SERPL-MCNC: 16 MG/DL (ref 6–20)
BUN/CREAT SERPL: 15.2 (ref 7–25)
CALCIUM SERPL-MCNC: 9.2 MG/DL (ref 8.6–10.5)
CHLORIDE SERPL-SCNC: 97 MMOL/L (ref 98–107)
CHOLEST SERPL-MCNC: 114 MG/DL (ref 0–200)
CO2 SERPL-SCNC: 24.8 MMOL/L (ref 22–29)
CREAT SERPL-MCNC: 1.05 MG/DL (ref 0.76–1.27)
EOSINOPHIL # BLD AUTO: 0.09 10*3/MM3 (ref 0–0.4)
EOSINOPHIL NFR BLD AUTO: 1.6 % (ref 0.3–6.2)
ERYTHROCYTE [DISTWIDTH] IN BLOOD BY AUTOMATED COUNT: 13.5 % (ref 12.3–15.4)
GLOBULIN SER CALC-MCNC: 2.7 GM/DL
GLUCOSE SERPL-MCNC: 131 MG/DL (ref 65–99)
HBA1C MFR BLD: 6.5 % (ref 4.8–5.6)
HCT VFR BLD AUTO: 44.8 % (ref 37.5–51)
HDLC SERPL-MCNC: 30 MG/DL (ref 40–60)
HGB BLD-MCNC: 15.1 G/DL (ref 13–17.7)
IMM GRANULOCYTES # BLD AUTO: 0.02 10*3/MM3 (ref 0–0.05)
IMM GRANULOCYTES NFR BLD AUTO: 0.3 % (ref 0–0.5)
LDLC SERPL CALC-MCNC: 41 MG/DL (ref 0–100)
LYMPHOCYTES # BLD AUTO: 1.77 10*3/MM3 (ref 0.7–3.1)
LYMPHOCYTES NFR BLD AUTO: 30.7 % (ref 19.6–45.3)
MCH RBC QN AUTO: 29.4 PG (ref 26.6–33)
MCHC RBC AUTO-ENTMCNC: 33.7 G/DL (ref 31.5–35.7)
MCV RBC AUTO: 87.3 FL (ref 79–97)
MONOCYTES # BLD AUTO: 0.41 10*3/MM3 (ref 0.1–0.9)
MONOCYTES NFR BLD AUTO: 7.1 % (ref 5–12)
NEUTROPHILS # BLD AUTO: 3.44 10*3/MM3 (ref 1.7–7)
NEUTROPHILS NFR BLD AUTO: 59.8 % (ref 42.7–76)
NRBC BLD AUTO-RTO: 0 /100 WBC (ref 0–0.2)
PLATELET # BLD AUTO: 145 10*3/MM3 (ref 140–450)
POTASSIUM SERPL-SCNC: 4 MMOL/L (ref 3.5–5.2)
PROT SERPL-MCNC: 7 G/DL (ref 6–8.5)
PSA SERPL-MCNC: 0.34 NG/ML (ref 0–4)
RBC # BLD AUTO: 5.13 10*6/MM3 (ref 4.14–5.8)
SODIUM SERPL-SCNC: 138 MMOL/L (ref 136–145)
TESTOST SERPL-MCNC: 557 NG/DL (ref 264–916)
TRIGL SERPL-MCNC: 213 MG/DL (ref 0–150)
VLDLC SERPL CALC-MCNC: 42.6 MG/DL
WBC # BLD AUTO: 5.76 10*3/MM3 (ref 3.4–10.8)

## 2020-03-06 ENCOUNTER — TELEPHONE (OUTPATIENT)
Dept: FAMILY MEDICINE CLINIC | Facility: CLINIC | Age: 50
End: 2020-03-06

## 2020-03-06 RX ORDER — AMOXICILLIN 875 MG/1
875 TABLET, COATED ORAL 2 TIMES DAILY
Qty: 20 TABLET | Refills: 0 | Status: SHIPPED | OUTPATIENT
Start: 2020-03-06 | End: 2020-05-13

## 2020-03-08 NOTE — PROGRESS NOTES
Diabetes is adequately controlled.  Cholesterol is adequately controlled.  Triglycerides mildly elevated, continue to work on weight loss.  Rest of labs normal or stable.

## 2020-03-23 RX ORDER — LISINOPRIL 10 MG/1
TABLET ORAL
Qty: 90 TABLET | Refills: 3 | Status: SHIPPED | OUTPATIENT
Start: 2020-03-23 | End: 2021-05-20 | Stop reason: SDUPTHER

## 2020-03-25 DIAGNOSIS — E29.1 HYPOGONADISM IN MALE: ICD-10-CM

## 2020-03-26 RX ORDER — TESTOSTERONE 16.2 MG/G
GEL TRANSDERMAL
Qty: 75 G | Refills: 2 | Status: SHIPPED | OUTPATIENT
Start: 2020-03-26 | End: 2022-12-06

## 2020-04-03 DIAGNOSIS — E78.1 HYPERTRIGLYCERIDEMIA: ICD-10-CM

## 2020-04-03 DIAGNOSIS — E11.42 TYPE 2 DIABETES MELLITUS WITH DIABETIC POLYNEUROPATHY, WITHOUT LONG-TERM CURRENT USE OF INSULIN (HCC): ICD-10-CM

## 2020-04-03 RX ORDER — ATORVASTATIN CALCIUM 20 MG/1
TABLET, FILM COATED ORAL
Qty: 30 TABLET | Refills: 10 | Status: SHIPPED | OUTPATIENT
Start: 2020-04-03 | End: 2020-07-07

## 2020-05-03 DIAGNOSIS — E11.42 DIABETIC PERIPHERAL NEUROPATHY (HCC): Primary | ICD-10-CM

## 2020-05-04 RX ORDER — EMPAGLIFLOZIN 25 MG/1
TABLET, FILM COATED ORAL
Qty: 30 TABLET | Refills: 3 | Status: SHIPPED | OUTPATIENT
Start: 2020-05-04 | End: 2020-09-08

## 2020-05-04 RX ORDER — GABAPENTIN 400 MG/1
CAPSULE ORAL
Qty: 90 CAPSULE | Refills: 4 | Status: SHIPPED | OUTPATIENT
Start: 2020-05-04 | End: 2021-02-23

## 2020-05-13 ENCOUNTER — OFFICE VISIT (OUTPATIENT)
Dept: FAMILY MEDICINE CLINIC | Facility: CLINIC | Age: 50
End: 2020-05-13

## 2020-05-13 VITALS
TEMPERATURE: 98.1 F | SYSTOLIC BLOOD PRESSURE: 138 MMHG | DIASTOLIC BLOOD PRESSURE: 72 MMHG | BODY MASS INDEX: 39.17 KG/M2 | HEART RATE: 96 BPM | WEIGHT: 315 LBS | OXYGEN SATURATION: 98 % | HEIGHT: 75 IN

## 2020-05-13 DIAGNOSIS — M54.2 NECK PAIN: ICD-10-CM

## 2020-05-13 DIAGNOSIS — S70.02XA HEMATOMA OF HIP, LEFT, INITIAL ENCOUNTER: ICD-10-CM
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
## 2020-05-13 DIAGNOSIS — M25.552 HIP PAIN, ACUTE, LEFT: ICD-10-CM

## 2020-05-13 PROCEDURE — 99214 OFFICE O/P EST MOD 30 MIN: CPT | Performed by: NURSE PRACTITIONER

## 2020-05-13 RX ORDER — CYCLOBENZAPRINE HCL 10 MG
10 TABLET ORAL 3 TIMES DAILY PRN
Qty: 90 TABLET | Refills: 0 | Status: SHIPPED | OUTPATIENT
Start: 2020-05-13 | End: 2021-09-14 | Stop reason: SDUPTHER
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
## 2020-06-04 DIAGNOSIS — E66.01 MORBID OBESITY DUE TO EXCESS CALORIES (HCC): ICD-10-CM

## 2020-06-04 DIAGNOSIS — E11.42 TYPE 2 DIABETES MELLITUS WITH DIABETIC POLYNEUROPATHY, WITHOUT LONG-TERM CURRENT USE OF INSULIN (HCC): ICD-10-CM

## 2020-06-04 RX ORDER — DULAGLUTIDE 1.5 MG/.5ML
INJECTION, SOLUTION SUBCUTANEOUS
Qty: 5 PEN | Refills: 5 | Status: SHIPPED | OUTPATIENT
Start: 2020-06-04 | End: 2021-04-07

## 2020-07-06 RX ORDER — FUROSEMIDE 40 MG/1
TABLET ORAL
Qty: 30 TABLET | Refills: 3 | Status: SHIPPED | OUTPATIENT
Start: 2020-07-06 | End: 2020-11-16

## 2020-07-07 DIAGNOSIS — E78.1 HYPERTRIGLYCERIDEMIA: ICD-10-CM

## 2020-07-07 DIAGNOSIS — E11.42 TYPE 2 DIABETES MELLITUS WITH DIABETIC POLYNEUROPATHY, WITHOUT LONG-TERM CURRENT USE OF INSULIN (HCC): ICD-10-CM

## 2020-07-07 RX ORDER — ATORVASTATIN CALCIUM 20 MG/1
TABLET, FILM COATED ORAL
Qty: 90 TABLET | Refills: 0 | Status: SHIPPED | OUTPATIENT
Start: 2020-07-07 | End: 2021-01-06 | Stop reason: SDUPTHER

## 2020-07-14 DIAGNOSIS — E11.42 TYPE 2 DIABETES MELLITUS WITH DIABETIC POLYNEUROPATHY, WITHOUT LONG-TERM CURRENT USE OF INSULIN (HCC): ICD-10-CM

## 2020-08-07 DIAGNOSIS — M1A.09X0 IDIOPATHIC CHRONIC GOUT OF MULTIPLE SITES WITHOUT TOPHUS: ICD-10-CM

## 2020-08-07 RX ORDER — IBUPROFEN 800 MG/1
TABLET ORAL
Qty: 90 TABLET | Refills: 0 | Status: SHIPPED | OUTPATIENT
Start: 2020-08-07 | End: 2020-09-08

## 2020-08-07 RX ORDER — ALLOPURINOL 300 MG/1
TABLET ORAL
Qty: 60 TABLET | Refills: 4 | Status: SHIPPED | OUTPATIENT
Start: 2020-08-07 | End: 2021-05-20 | Stop reason: SDUPTHER

## 2020-09-04 ENCOUNTER — OFFICE VISIT (OUTPATIENT)
Dept: FAMILY MEDICINE CLINIC | Facility: CLINIC | Age: 50
End: 2020-09-04

## 2020-09-04 VITALS
WEIGHT: 315 LBS | BODY MASS INDEX: 39.17 KG/M2 | OXYGEN SATURATION: 98 % | DIASTOLIC BLOOD PRESSURE: 74 MMHG | HEIGHT: 75 IN | HEART RATE: 81 BPM | TEMPERATURE: 98 F | SYSTOLIC BLOOD PRESSURE: 128 MMHG

## 2020-09-04 DIAGNOSIS — I89.0 LYMPHEDEMA: ICD-10-CM

## 2020-09-04 DIAGNOSIS — N40.0 ENLARGED PROSTATE: ICD-10-CM

## 2020-09-04 DIAGNOSIS — E78.1 HYPERTRIGLYCERIDEMIA: ICD-10-CM

## 2020-09-04 DIAGNOSIS — Z23 IMMUNIZATION DUE: ICD-10-CM

## 2020-09-04 DIAGNOSIS — Z79.899 DRUG THERAPY: ICD-10-CM

## 2020-09-04 DIAGNOSIS — E29.1 HYPOGONADISM IN MALE: ICD-10-CM

## 2020-09-04 DIAGNOSIS — M1A.09X0 IDIOPATHIC CHRONIC GOUT OF MULTIPLE SITES WITHOUT TOPHUS: ICD-10-CM

## 2020-09-04 DIAGNOSIS — E66.01 MORBID OBESITY DUE TO EXCESS CALORIES (HCC): ICD-10-CM

## 2020-09-04 DIAGNOSIS — Z00.00 MEDICARE ANNUAL WELLNESS VISIT, SUBSEQUENT: Primary | ICD-10-CM

## 2020-09-04 DIAGNOSIS — E11.42 DIABETIC PERIPHERAL NEUROPATHY (HCC): ICD-10-CM

## 2020-09-04 DIAGNOSIS — I10 BENIGN ESSENTIAL HYPERTENSION: ICD-10-CM

## 2020-09-04 DIAGNOSIS — E11.42 TYPE 2 DIABETES MELLITUS WITH DIABETIC POLYNEUROPATHY, WITHOUT LONG-TERM CURRENT USE OF INSULIN (HCC): ICD-10-CM

## 2020-09-04 PROCEDURE — 90686 IIV4 VACC NO PRSV 0.5 ML IM: CPT | Performed by: FAMILY MEDICINE

## 2020-09-04 PROCEDURE — 99214 OFFICE O/P EST MOD 30 MIN: CPT | Performed by: FAMILY MEDICINE

## 2020-09-04 PROCEDURE — 90471 IMMUNIZATION ADMIN: CPT | Performed by: FAMILY MEDICINE

## 2020-09-04 PROCEDURE — G0439 PPPS, SUBSEQ VISIT: HCPCS | Performed by: FAMILY MEDICINE

## 2020-09-04 NOTE — PROGRESS NOTES
QUICK REFERENCE INFORMATION:  The ABCs of the Annual Wellness Visit    Subsequent Medicare Wellness Visit    HEALTH RISK ASSESSMENT    1970    Recent Hospitalizations:  No hospitalization(s) within the last year..        Current Medical Providers:  Patient Care Team:  Tesfaye Swartz DO as PCP - General        Smoking Status:  Social History     Tobacco Use   Smoking Status Never Smoker   Smokeless Tobacco Never Used       Alcohol Consumption:  Social History     Substance and Sexual Activity   Alcohol Use No       Depression Screen:   PHQ-2/PHQ-9 Depression Screening 9/4/2020   Little interest or pleasure in doing things 0   Feeling down, depressed, or hopeless 0   Trouble falling or staying asleep, or sleeping too much 1   Feeling tired or having little energy 0   Poor appetite or overeating 0   Feeling bad about yourself - or that you are a failure or have let yourself or your family down 0   Trouble concentrating on things, such as reading the newspaper or watching television 0   Moving or speaking so slowly that other people could have noticed. Or the opposite - being so fidgety or restless that you have been moving around a lot more than usual 0   Thoughts that you would be better off dead, or of hurting yourself in some way 0   Total Score 1   If you checked off any problems, how difficult have these problems made it for you to do your work, take care of things at home, or get along with other people? Not difficult at all       Health Habits and Functional and Cognitive Screening:  Functional & Cognitive Status 9/4/2020   Do you have difficulty preparing food and eating? No   Do you have difficulty bathing yourself, getting dressed or grooming yourself? No   Do you have difficulty using the toilet? No   Do you have difficulty moving around from place to place? No   Do you have trouble with steps or getting out of a bed or a chair? No   Current Diet Well Balanced Diet   Dental Exam Up to date   Eye  Exam Up to date   Exercise (times per week) 7 times per week   Current Exercise Activities Include Walking   Do you need help using the phone?  No   Are you deaf or do you have serious difficulty hearing?  No   Do you need help with transportation? No   Do you need help shopping? No   Do you need help preparing meals?  No   Do you need help with housework?  No   Do you need help with laundry? No   Do you need help taking your medications? No   Do you need help managing money? No   Do you ever drive or ride in a car without wearing a seat belt? No   Have you felt unusual stress, anger or loneliness in the last month? No   Who do you live with? Spouse   If you need help, do you have trouble finding someone available to you? No   Have you been bothered in the last four weeks by sexual problems? No   Do you have difficulty concentrating, remembering or making decisions? No           Does the patient have evidence of cognitive impairment? No    Aspirin use counseling: Taking ASA appropriately as indicated      Recent Lab Results:  CMP:  Lab Results   Component Value Date     (H) 03/03/2020    BUN 16 03/03/2020    CREATININE 1.05 03/03/2020    EGFRIFNONA 75 03/03/2020    EGFRIFAFRI 91 03/03/2020    BCR 15.2 03/03/2020     03/03/2020    K 4.0 03/03/2020    CO2 24.8 03/03/2020    CALCIUM 9.2 03/03/2020    PROTENTOTREF 7.0 03/03/2020    ALBUMIN 4.30 03/03/2020    LABGLOBREF 2.7 03/03/2020    LABIL2 1.6 03/03/2020    BILITOT 0.5 03/03/2020    ALKPHOS 93 03/03/2020    AST 19 03/03/2020    ALT 23 03/03/2020     Lipid Panel:  Lab Results   Component Value Date    TRIG 213 (H) 03/03/2020    HDL 30 (L) 03/03/2020    VLDL 42.6 03/03/2020     HbA1c:  Lab Results   Component Value Date    HGBA1C 6.50 (H) 03/03/2020       Visual Acuity:  No exam data present    Age-appropriate Screening Schedule:  Refer to the list below for future screening recommendations based on patient's age, sex and/or medical conditions. Orders for  these recommended tests are listed in the plan section. The patient has been provided with a written plan.    Health Maintenance   Topic Date Due   • COLONOSCOPY  01/19/2016   • INFLUENZA VACCINE  08/01/2020   • DIABETIC EYE EXAM  08/05/2020   • URINE MICROALBUMIN  08/22/2020   • HEMOGLOBIN A1C  09/03/2020   • DIABETIC FOOT EXAM  03/03/2021   • LIPID PANEL  03/03/2021   • TDAP/TD VACCINES (2 - Td) 01/19/2026   • PNEUMOCOCCAL VACCINE (19-64 MEDIUM RISK)  Completed        Subjective   History of Present Illness    Marcial Desai is a 50 y.o. male who presents for an Subsequent Wellness Visit.    The following portions of the patient's history were reviewed and updated as appropriate: allergies, current medications, past family history, past medical history, past social history, past surgical history and problem list.    Outpatient Medications Prior to Visit   Medication Sig Dispense Refill   • ACCU-CHEK TYREL PLUS test strip TEST DAILY 50 each 2   • allopurinol (ZYLOPRIM) 300 MG tablet TAKE ONE TABLET BY MOUTH TWICE A DAY 60 tablet 4   • atorvastatin (LIPITOR) 20 MG tablet TAKE 1 TABLET BY MOUTH AT BEDTIME  90 tablet 0   • Blood Glucose Monitoring Suppl (ACCU-CHEK TYREL PLUS) w/Device kit      • buPROPion XL (WELLBUTRIN XL) 150 MG 24 hr tablet TAKE ONE TABLET BY MOUTH DAILY 30 tablet 4   • cetirizine (zyrTEC) 10 MG tablet TAKE ONE TABLET BY MOUTH DAILY 30 tablet 3   • chlorhexidine (PERIDEX) 0.12 % solution Apply 15 mL to the mouth or throat 2 (Two) Times a Day. Swish, gargle and spit 473 mL 12   • cyclobenzaprine (FLEXERIL) 10 MG tablet Take 1 tablet by mouth 3 (Three) Times a Day As Needed for Muscle Spasms. 90 tablet 0   • fluticasone (FLONASE) 50 MCG/ACT nasal spray PLACE 2 SPRAYS IN EACH NOSTRIL DAILY 16 g 0   • furosemide (LASIX) 40 MG tablet TAKE ONE TABLET BY MOUTH DAILY 30 tablet 3   • gabapentin (NEURONTIN) 400 MG capsule TAKE ONE CAPSULE BY MOUTH THREE TIMES A DAY 90 capsule 4   • glucose blood test strip  Check once daily E11.9 non-iddm;  States [employer] covered;  Also disp alcohol swabs, lancets. 50 each 12   • glucose monitor monitoring kit Check once daily d x E11.9 non-iddm 1 each 0   • hydrocortisone 2.5 % cream Apply  topically 2 (two) times a day. Wipe bottom twice daily 28 g 0   • ibuprofen (ADVIL,MOTRIN) 800 MG tablet TAKE ONE TABLET BY MOUTH EVERY 8 HOURS AS NEEDED FOR MILD TO MODERATE PAIN 90 tablet 0   • JARDIANCE 25 MG tablet TAKE ONE TABLET BY MOUTH DAILY 30 tablet 3   • Lancets misc 1 Device Daily. Dispense brand covered by insurance. Dx. e11.9 100 each 12   • lisinopril (PRINIVIL,ZESTRIL) 10 MG tablet TAKE ONE TABLET BY MOUTH DAILY 90 tablet 3   • magnesium oxide (MAGOX) 400 (241.3 Mg) MG tablet tablet TAKE ONE TABLET BY MOUTH DAILY 30 tablet 10   • metFORMIN (GLUCOPHAGE) 1000 MG tablet take 1 and 1/2 tablet by mouth in the morning then take 1 tablet in the evening 235 tablet 2   • mupirocin (BACTROBAN) 2 % ointment APPLY TO AFFECTED AREA(S) TWO TIMES A DAY 22 g 1   • potassium chloride (K-DUR,KLOR-CON) 10 MEQ CR tablet TAKE THREE TABLETS BY MOUTH TWICE A DAY 54 tablet 0   • sildenafil (VIAGRA) 100 MG tablet 1/2 to 1 po daily prn ED 10 tablet 5   • Testosterone 20.25 MG/ACT (1.62%) gel APPLY 1 PUMP DAILY 75 g 2   • triamcinolone (KENALOG) 0.1 % cream APPLY A THIN LAYER TO AFFECTED AREA TWO TIMES A DAY 30 g 2   • TRULICITY 1.5 MG/0.5ML solution pen-injector INJECT 1.5MG UNDER THE SKIN INTO THE APPROPRIATE AREA AS DIRECTED EVERY 7 DAYS 5 pen 5   • urea (CARMOL) 40 % cream Apply  topically to the appropriate area as directed Daily. 2nd toe 60 g 5     Facility-Administered Medications Prior to Visit   Medication Dose Route Frequency Provider Last Rate Last Dose   • Testosterone Cypionate (DEPOTESTOTERONE CYPIONATE) injection 100 mg  100 mg Intramuscular Q14 Days Tesfaye Swartz, DO   100 mg at 06/25/19 1220       Patient Active Problem List   Diagnosis   • Ankle pain   • Atopic dermatitis   • Benign  essential hypertension   • Type 2 diabetes mellitus with diabetic polyneuropathy, without long-term current use of insulin (CMS/HCC)   • Diabetic peripheral neuropathy (CMS/HCC)   • Abnormal liver function tests   • Idiopathic chronic gout of multiple sites without tophus   • Hypertriglyceridemia   • Cramps of lower extremity   • Edema of lower extremity   • Lymphedema   • Osteoarthritis   • Morbid obesity due to excess calories (CMS/HCC)   • Hypogonadism in male       Advance Care Planning:  ACP discussion was held with the patient during this visit. Patient does not have an advance directive, information provided.    Identification of Risk Factors:  Risk factors include: Obesity/Overweight .    Review of Systems   Respiratory: Negative for shortness of breath.    Cardiovascular: Negative for chest pain, palpitations, orthopnea and PND.       Compared to one year ago, the patient feels his physical health is the same.  Compared to one year ago, the patient feels his mental health is the same.    Objective     Physical Exam   Constitutional: He appears well-developed and well-nourished.   HENT:   Head: Normocephalic and atraumatic.   Neck: Neck supple. No JVD present. No thyromegaly present.   Cardiovascular: Normal rate, regular rhythm and normal heart sounds. Exam reveals no gallop and no friction rub.   No murmur heard.  Pulmonary/Chest: Effort normal and breath sounds normal. No respiratory distress. He has no wheezes. He has no rales.   Abdominal: Soft. Bowel sounds are normal. He exhibits no distension. There is no tenderness. There is no rebound and no guarding.   Musculoskeletal: He exhibits edema (lymphedema).   Neurological: He is alert.   Skin: Skin is warm and dry.   Psychiatric: He has a normal mood and affect. His behavior is normal.   Nursing note and vitals reviewed.      Vitals:    09/04/20 0840   BP: 128/74   Pulse: 81   Temp: 98 °F (36.7 °C)   TempSrc: Infrared   SpO2: 98%   Weight: (!) 163 kg  "(360 lb)   Height: 190.5 cm (75\")   PainSc: 0-No pain       Patient's Body mass index is 45 kg/m². BMI is above normal parameters. Recommendations include: educational material.      Assessment/Plan   Patient Self-Management and Personalized Health Advice  The patient has been provided with information about: diet and exercise and preventive services including:   · Annual Wellness Visit (AWV).    Visit Diagnoses:    ICD-10-CM ICD-9-CM   1. Medicare annual wellness visit, subsequent Z00.00 V70.0   2. Type 2 diabetes mellitus with diabetic polyneuropathy, without long-term current use of insulin (CMS/[facility]) E11.42 250.60     357.2   3. Morbid obesity due to excess calories (CMS/HCC) E66.01 278.01   4. Diabetic peripheral neuropathy (CMS/[facility]) E11.42 250.60     357.2   5. Benign essential hypertension I10 401.1   6. Hypertriglyceridemia E78.1 272.1   7. Lymphedema I89.0 457.1   8. Hypogonadism in male E29.1 257.2   9. Idiopathic chronic gout of multiple sites without tophus M1A.09X0 274.02   10. Enlarged prostate N40.0 600.00   11. Drug therapy Z79.899 V58.69   12. Immunization due Z23 V05.9       Orders Placed This Encounter   Procedures   • Fluarix Quad >6 Months (4330-0830)   • Microalbumin / Creatinine Urine Ratio - Urine, Clean Catch   • Lipid Panel   • Comprehensive Metabolic Panel   • Hemoglobin A1c   • Testosterone   • PSA DIAGNOSTIC   • Uric Acid   • CBC & Differential     Order Specific Question:   Manual Differential     Answer:   No       Outpatient Encounter Medications as of 9/4/2020   Medication Sig Dispense Refill   • ACCU-CHEK TYREL PLUS test strip TEST DAILY 50 each 2   • allopurinol (ZYLOPRIM) 300 MG tablet TAKE ONE TABLET BY MOUTH TWICE A DAY 60 tablet 4   • atorvastatin (LIPITOR) 20 MG tablet TAKE 1 TABLET BY MOUTH AT BEDTIME  90 tablet 0   • Blood Glucose Monitoring Suppl (ACCU-CHEK TYREL PLUS) w/Device kit      • buPROPion XL (WELLBUTRIN XL) 150 MG 24 hr tablet TAKE ONE TABLET BY MOUTH DAILY 30 " tablet 4   • cetirizine (zyrTEC) 10 MG tablet TAKE ONE TABLET BY MOUTH DAILY 30 tablet 3   • chlorhexidine (PERIDEX) 0.12 % solution Apply 15 mL to the mouth or throat 2 (Two) Times a Day. Swish, gargle and spit 473 mL 12   • cyclobenzaprine (FLEXERIL) 10 MG tablet Take 1 tablet by mouth 3 (Three) Times a Day As Needed for Muscle Spasms. 90 tablet 0   • fluticasone (FLONASE) 50 MCG/ACT nasal spray PLACE 2 SPRAYS IN EACH NOSTRIL DAILY 16 g 0   • furosemide (LASIX) 40 MG tablet TAKE ONE TABLET BY MOUTH DAILY 30 tablet 3   • gabapentin (NEURONTIN) 400 MG capsule TAKE ONE CAPSULE BY MOUTH THREE TIMES A DAY 90 capsule 4   • glucose blood test strip Check once daily E11.9 non-iddm;  States [employer] covered;  Also disp alcohol swabs, lancets. 50 each 12   • glucose monitor monitoring kit Check once daily d x E11.9 non-iddm 1 each 0   • hydrocortisone 2.5 % cream Apply  topically 2 (two) times a day. Wipe bottom twice daily 28 g 0   • ibuprofen (ADVIL,MOTRIN) 800 MG tablet TAKE ONE TABLET BY MOUTH EVERY 8 HOURS AS NEEDED FOR MILD TO MODERATE PAIN 90 tablet 0   • JARDIANCE 25 MG tablet TAKE ONE TABLET BY MOUTH DAILY 30 tablet 3   • Lancets misc 1 Device Daily. Dispense brand covered by insurance. Dx. e11.9 100 each 12   • lisinopril (PRINIVIL,ZESTRIL) 10 MG tablet TAKE ONE TABLET BY MOUTH DAILY 90 tablet 3   • magnesium oxide (MAGOX) 400 (241.3 Mg) MG tablet tablet TAKE ONE TABLET BY MOUTH DAILY 30 tablet 10   • metFORMIN (GLUCOPHAGE) 1000 MG tablet take 1 and 1/2 tablet by mouth in the morning then take 1 tablet in the evening 235 tablet 2   • mupirocin (BACTROBAN) 2 % ointment APPLY TO AFFECTED AREA(S) TWO TIMES A DAY 22 g 1   • potassium chloride (K-DUR,KLOR-CON) 10 MEQ CR tablet TAKE THREE TABLETS BY MOUTH TWICE A DAY 54 tablet 0   • sildenafil (VIAGRA) 100 MG tablet 1/2 to 1 po daily prn ED 10 tablet 5   • Testosterone 20.25 MG/ACT (1.62%) gel APPLY 1 PUMP DAILY 75 g 2   • triamcinolone (KENALOG) 0.1 % cream APPLY A  THIN LAYER TO AFFECTED AREA TWO TIMES A DAY 30 g 2   • TRULICITY 1.5 MG/0.5ML solution pen-injector INJECT 1.5MG UNDER THE SKIN INTO THE APPROPRIATE AREA AS DIRECTED EVERY 7 DAYS 5 pen 5   • urea (CARMOL) 40 % cream Apply  topically to the appropriate area as directed Daily. 2nd toe 60 g 5     Facility-Administered Encounter Medications as of 9/4/2020   Medication Dose Route Frequency Provider Last Rate Last Dose   • Testosterone Cypionate (DEPOTESTOTERONE CYPIONATE) injection 100 mg  100 mg Intramuscular Q14 Days Tesfaye Swartz, DO   100 mg at 06/25/19 1220       Reviewed use of high risk medication in the elderly: yes  Reviewed for potential of harmful drug interactions in the elderly: yes    Follow Up:  Return in about 6 months (around 3/4/2021), or if symptoms worsen or fail to improve.     An After Visit Summary and PPPS with all of these plans were given to the patient.           ++++++++++++++++++++++++++++++++++++++++++++++++++++++++++++++++++     Chief Complaint   Patient presents with   • Follow-up     follow up on diabetes   • Medicare Wellness-subsequent   • Diabetes   • Hypertension   • Hyperlipidemia   • Hypogonadism   • Gout   Due for evaluation of above in addition to AWV today.    Diabetes   He presents for his follow-up diabetic visit. He has type 2 diabetes mellitus. His disease course has been stable. Pertinent negatives for diabetes include no chest pain, no foot paresthesias and no foot ulcerations. Symptoms are stable. Diabetic complications include peripheral neuropathy. An ACE inhibitor/angiotensin II receptor blocker is being taken.   Hypertension   This is a chronic problem. The current episode started more than 1 year ago. The problem is unchanged. The problem is controlled. Associated symptoms include peripheral edema. Pertinent negatives include no chest pain, orthopnea, palpitations, PND or shortness of breath. There are no associated agents to hypertension. The current treatment  "provides moderate improvement. There are no compliance problems.    Hyperlipidemia   This is a chronic problem. The current episode started more than 1 year ago. The problem is controlled. Recent lipid tests were reviewed and are normal. Exacerbating diseases include diabetes. Pertinent negatives include no chest pain or shortness of breath. The current treatment provides moderate improvement of lipids. Risk factors for coronary artery disease include dyslipidemia, diabetes mellitus, male sex, hypertension and obesity.   Hypogonadism  Doing ok, but forgets testsoterone gel frequently;  Due for las  Gout  On allopurinol and no flares, due today for recheck uric acid;    Leg Swelling   This is a chronic problem. The current episode started more than 1 year ago. The problem occurs constantly. The problem has been waxing and waning. Associated symptoms include coughing and a sore throat. Pertinent negatives include no chest pain, nausea or vomiting. Exacerbated by: has lymphedema and varicose veins. Treatments tried: exercises, compression wraps, elevation, diuretics and varicsoe vein treatments;  Exercising and walking with weight loss. Improvement on treatment: minimal relief;      Review of Systems   Cardiovascular: Negative for chest pain, orthopnea, palpitations and paroxysmal nocturnal dyspnea.   Respiratory: Negative for shortness of breath.        Social History     Tobacco Use   • Smoking status: Never Smoker   • Smokeless tobacco: Never Used   Substance Use Topics   • Alcohol use: No     O:   Vitals:    09/04/20 0840   BP: 128/74   Pulse: 81   Temp: 98 °F (36.7 °C)   TempSrc: Infrared   SpO2: 98%   Weight: (!) 163 kg (360 lb)   Height: 190.5 cm (75\")   PainSc: 0-No pain     Body mass index is 45 kg/m².  Physical Exam   Constitutional: He appears well-developed and well-nourished.   HENT:   Head: Normocephalic and atraumatic.   Neck: Neck supple. No JVD present. No thyromegaly present.   Cardiovascular: Normal " rate, regular rhythm and normal heart sounds. Exam reveals no gallop and no friction rub.   No murmur heard.  Pulmonary/Chest: Effort normal and breath sounds normal. No respiratory distress. He has no wheezes. He has no rales.   Abdominal: Soft. Bowel sounds are normal. He exhibits no distension. There is no tenderness. There is no rebound and no guarding.   Musculoskeletal: He exhibits edema (lymphedema).   Neurological: He is alert.   Skin: Skin is warm and dry.   Psychiatric: He has a normal mood and affect. His behavior is normal.   Nursing note and vitals reviewed.      Marcial was seen today for follow-up, medicare wellness-subsequent, diabetes, hypertension, hyperlipidemia, hypogonadism and gout.    Diagnoses and all orders for this visit:    Medicare annual wellness visit, subsequent    Type 2 diabetes mellitus with diabetic polyneuropathy, without long-term current use of insulin (CMS/[facility])  -     Microalbumin / Creatinine Urine Ratio - Urine, Clean Catch  -     Lipid Panel  -     Comprehensive Metabolic Panel  -     Hemoglobin A1c    Morbid obesity due to excess calories (CMS/[facility])    Diabetic peripheral neuropathy (CMS/[facility])    Benign essential hypertension    Hypertriglyceridemia  -     Lipid Panel  -     Comprehensive Metabolic Panel    Lymphedema    Hypogonadism in male  -     Testosterone  -     PSA DIAGNOSTIC  -     CBC & Differential    Idiopathic chronic gout of multiple sites without tophus  -     Uric Acid    Enlarged prostate  -     PSA DIAGNOSTIC    Drug therapy  -     Comprehensive Metabolic Panel  -     CBC & Differential  -     Uric Acid    Immunization due  -     Fluarix Quad >6 Months (7830-6296)    -dm2 - continue medications, recheck labs due today  -gout - due for recheck uric acid, renal function and cbc on allopurinol  -I explained at length to patient risks of replacement that can include acne, mood changes, cardiovascular disease, polycythemia and liver dysfunction.  I also warned about  concerns regarding prostate and breast cancer.   I discussed with potential for improvement in fatigue and preventing osteoporosis.  I discussed will need to monitor labs regularly.  -HLD - continue statin, recheck lipids due today  -hypertension - controlled, continue medications    -lymphedema - patient has been compliant with compression wraps (completed lymphedema clinic, varicose vein clinic plus), compliant with walking and working on weight loss;  Elevates legs and works on diet, but failed conservative measures.  He would benefit from lymphedema pump.  Will fax order as met deductible now.    Return in about 6 months (around 3/4/2021), or if symptoms worsen or fail to improve.

## 2020-09-04 NOTE — PATIENT INSTRUCTIONS
Advance Directive    Advance directives are legal documents that let you make choices ahead of time about your health care and medical treatment in case you become unable to communicate for yourself. Advance directives are a way for you to communicate your wishes to family, friends, and health care providers. This can help convey your decisions about end-of-life care if you become unable to communicate.  Discussing and writing advance directives should happen over time rather than all at once. Advance directives can be changed depending on your situation and what you want, even after you have signed the advance directives.  If you do not have an advance directive, some states assign family decision makers to act on your behalf based on how closely you are related to them. Each state has its own laws regarding advance directives. You may want to check with your health care provider, , or state representative about the laws in your state. There are different types of advance directives, such as:  · Medical power of .  · Living will.  · Do not resuscitate (DNR) or do not attempt resuscitation (DNAR) order.  Health care proxy and medical power of   A health care proxy, also called a health care agent, is a person who is appointed to make medical decisions for you in cases in which you are unable to make the decisions yourself. Generally, people choose someone they know well and trust to represent their preferences. Make sure to ask this person for an agreement to act as your proxy. A proxy may have to exercise judgment in the event of a medical decision for which your wishes are not known.  A medical power of  is a legal document that names your health care proxy. Depending on the laws in your state, after the document is written, it may also need to be:  · Signed.  · Notarized.  · Dated.  · Copied.  · Witnessed.  · Incorporated into your medical record.  You may also want to appoint  someone to manage your financial affairs in a situation in which you are unable to do so. This is called a durable power of  for finances. It is a separate legal document from the durable power of  for health care. You may choose the same person or someone different from your health care proxy to act as your agent in financial matters.  If you do not appoint a proxy, or if there is a concern that the proxy is not acting in your best interests, a court-appointed guardian may be designated to act on your behalf.  Living will  A living will is a set of instructions documenting your wishes about medical care when you cannot express them yourself. Health care providers should keep a copy of your living will in your medical record. You may want to give a copy to family members or friends. To alert caregivers in case of an emergency, you can place a card in your wallet to let them know that you have a living will and where they can find it. A living will is used if you become:  · Terminally ill.  · Incapacitated.  · Unable to communicate or make decisions.  Items to consider in your living will include:  · The use or non-use of life-sustaining equipment, such as dialysis machines and breathing machines (ventilators).  · A DNR or DNAR order, which is the instruction not to use cardiopulmonary resuscitation (CPR) if breathing or heartbeat stops.  · The use or non-use of tube feeding.  · Withholding of food and fluids.  · Comfort (palliative) care when the goal becomes comfort rather than a cure.  · Organ and tissue donation.  A living will does not give instructions for distributing your money and property if you should pass away. It is recommended that you seek the advice of a  when writing a will. Decisions about taxes, beneficiaries, and asset distribution will be legally binding. This process can relieve your family and friends of any concerns surrounding disputes or questions that may come up about  the distribution of your assets.  DNR or DNAR  A DNR or DNAR order is a request not to have CPR in the event that your heart stops beating or you stop breathing. If a DNR or DNAR order has not been made and shared, a health care provider will try to help any patient whose heart has stopped or who has stopped breathing. If you plan to have surgery, talk with your health care provider about how your DNR or DNAR order will be followed if problems occur.  Summary  · Advance directives are the legal documents that allow you to make choices ahead of time about your health care and medical treatment in case you become unable to communicate for yourself.  · The process of discussing and writing advance directives should happen over time. You can change the advance directives, even after you have signed them.  · Advance directives include DNR or DNAR orders, living almodovar, and designating an agent as your medical power of .  This information is not intended to replace advice given to you by your health care provider. Make sure you discuss any questions you have with your health care provider.  Document Released: 03/26/2009 Document Revised: 01/22/2020 Document Reviewed: 11/06/2017  [employer] Patient Education © 2020 [employer]      Calorie Counting for Weight Loss  Calories are units of energy. Your body needs a certain amount of calories from food to keep you going throughout the day. When you eat more calories than your body needs, your body stores the extra calories as fat. When you eat fewer calories than your body needs, your body burns fat to get the energy it needs.  Calorie counting means keeping track of how many calories you eat and drink each day. Calorie counting can be helpful if you need to lose weight. If you make sure to eat fewer calories than your body needs, you should lose weight. Ask your health care provider what a healthy weight is for you.  For calorie counting to work, you will need to eat  the right number of calories in a day in order to lose a healthy amount of weight per week. A dietitian can help you determine how many calories you need in a day and will give you suggestions on how to reach your calorie goal.  · A healthy amount of weight to lose per week is usually 1-2 lb (0.5-0.9 kg). This usually means that your daily calorie intake should be reduced by 500-750 calories.  · Eating 1,200 - 1,500 calories per day can help most women lose weight.  · Eating 1,500 - 1,800 calories per day can help most men lose weight.  What is my plan?  My goal is to have __________ calories per day.  If I have this many calories per day, I should lose around __________ pounds per week.  What do I need to know about calorie counting?  In order to meet your daily calorie goal, you will need to:  · Find out how many calories are in each food you would like to eat. Try to do this before you eat.  · Decide how much of the food you plan to eat.  · Write down what you ate and how many calories it had. Doing this is called keeping a food log.  To successfully lose weight, it is important to balance calorie counting with a healthy lifestyle that includes regular activity. Aim for 150 minutes of moderate exercise (such as walking) or 75 minutes of vigorous exercise (such as running) each week.  Where do I find calorie information?    The number of calories in a food can be found on a Nutrition Facts label. If a food does not have a Nutrition Facts label, try to look up the calories online or ask your dietitian for help.  Remember that calories are listed per serving. If you choose to have more than one serving of a food, you will have to multiply the calories per serving by the amount of servings you plan to eat. For example, the label on a package of bread might say that a serving size is 1 slice and that there are 90 calories in a serving. If you eat 1 slice, you will have eaten 90 calories. If you eat 2 slices, you will  "have eaten 180 calories.  How do I keep a food log?  Immediately after each meal, record the following information in your food log:  · What you ate. Don't forget to include toppings, sauces, and other extras on the food.  · How much you ate. This can be measured in cups, ounces, or number of items.  · How many calories each food and drink had.  · The total number of calories in the meal.  Keep your food log near you, such as in a small notebook in your pocket, or use a mobile dania or website. Some programs will calculate calories for you and show you how many calories you have left for the day to meet your goal.  What are some calorie counting tips?    · Use your calories on foods and drinks that will fill you up and not leave you hungry:  ? Some examples of foods that fill you up are nuts and nut butters, vegetables, lean proteins, and high-fiber foods like whole grains. High-fiber foods are foods with more than 5 g fiber per serving.  ? Drinks such as sodas, specialty coffee drinks, alcohol, and juices have a lot of calories, yet do not fill you up.  · Eat nutritious foods and avoid empty calories. Empty calories are calories you get from foods or beverages that do not have many vitamins or protein, such as candy, sweets, and soda. It is better to have a nutritious high-calorie food (such as an avocado) than a food with few nutrients (such as a bag of chips).  · Know how many calories are in the foods you eat most often. This will help you calculate calorie counts faster.  · Pay attention to calories in drinks. Low-calorie drinks include water and unsweetened drinks.  · Pay attention to nutrition labels for \"low fat\" or \"fat free\" foods. These foods sometimes have the same amount of calories or more calories than the full fat versions. They also often have added sugar, starch, or salt, to make up for flavor that was removed with the fat.  · Find a way of tracking calories that works for you. Get creative. Try " different apps or programs if writing down calories does not work for you.  What are some portion control tips?  · Know how many calories are in a serving. This will help you know how many servings of a certain food you can have.  · Use a measuring cup to measure serving sizes. You could also try weighing out portions on a kitchen scale. With time, you will be able to estimate serving sizes for some foods.  · Take some time to put servings of different foods on your favorite plates, bowls, and cups so you know what a serving looks like.  · Try not to eat straight from a bag or box. Doing this can lead to overeating. Put the amount you would like to eat in a cup or on a plate to make sure you are eating the right portion.  · Use smaller plates, glasses, and bowls to prevent overeating.  · Try not to multitask (for example, watch TV or use your computer) while eating. If it is time to eat, sit down at a table and enjoy your food. This will help you to know when you are full. It will also help you to be aware of what you are eating and how much you are eating.  What are tips for following this plan?  Reading food labels  · Check the calorie count compared to the serving size. The serving size may be smaller than what you are used to eating.  · Check the source of the calories. Make sure the food you are eating is high in vitamins and protein and low in saturated and trans fats.  Shopping  · Read nutrition labels while you shop. This will help you make healthy decisions before you decide to purchase your food.  · Make a grocery list and stick to it.  Cooking  · Try to cook your favorite foods in a healthier way. For example, try baking instead of frying.  · Use low-fat dairy products.  Meal planning  · Use more fruits and vegetables. Half of your plate should be fruits and vegetables.  · Include lean proteins like poultry and fish.  How do I count calories when eating out?  · Ask for smaller portion sizes.  · Consider  "sharing an entree and sides instead of getting your own entree.  · If you get your own entree, eat only half. Ask for a box at the beginning of your meal and put the rest of your entree in it so you are not tempted to eat it.  · If calories are listed on the menu, choose the lower calorie options.  · Choose dishes that include vegetables, fruits, whole grains, low-fat dairy products, and lean protein.  · Choose items that are boiled, broiled, grilled, or steamed. Stay away from items that are buttered, battered, fried, or served with cream sauce. Items labeled \"crispy\" are usually fried, unless stated otherwise.  · Choose water, low-fat milk, unsweetened iced tea, or other drinks without added sugar. If you want an alcoholic beverage, choose a lower calorie option such as a glass of wine or light beer.  · Ask for dressings, sauces, and syrups on the side. These are usually high in calories, so you should limit the amount you eat.  · If you want a salad, choose a garden salad and ask for grilled meats. Avoid extra toppings like roque, cheese, or fried items. Ask for the dressing on the side, or ask for olive oil and vinegar or lemon to use as dressing.  · Estimate how many servings of a food you are given. For example, a serving of cooked rice is ½ cup or about the size of half a baseball. Knowing serving sizes will help you be aware of how much food you are eating at restaurants. The list below tells you how big or small some common portion sizes are based on everyday objects:  ? 1 oz--4 stacked dice.  ? 3 oz--1 deck of cards.  ? 1 tsp--1 die.  ? 1 Tbsp--½ a ping-pong ball.  ? 2 Tbsp--1 ping-pong ball.  ? ½ cup--½ baseball.  ? 1 cup--1 baseball.  Summary  · Calorie counting means keeping track of how many calories you eat and drink each day. If you eat fewer calories than your body needs, you should lose weight.  · A healthy amount of weight to lose per week is usually 1-2 lb (0.5-0.9 kg). This usually means " reducing your daily calorie intake by 500-750 calories.  · The number of calories in a food can be found on a Nutrition Facts label. If a food does not have a Nutrition Facts label, try to look up the calories online or ask your dietitian for help.  · Use your calories on foods and drinks that will fill you up, and not on foods and drinks that will leave you hungry.  · Use smaller plates, glasses, and bowls to prevent overeating.  This information is not intended to replace advice given to you by your health care provider. Make sure you discuss any questions you have with your health care provider.  Document Released: 12/18/2006 Document Revised: 09/06/2019 Document Reviewed: 11/17/2017  [employer] Patient Education © 2020 [employer]      Exercising to Lose Weight  Exercise is structured, repetitive physical activity to improve fitness and health. Getting regular exercise is important for everyone. It is especially important if you are overweight. Being overweight increases your risk of heart disease, stroke, diabetes, high blood pressure, and several types of cancer. Reducing your calorie intake and exercising can help you lose weight.  Exercise is usually categorized as moderate or vigorous intensity. To lose weight, most people need to do a certain amount of moderate-intensity or vigorous-intensity exercise each week.  Moderate-intensity exercise    Moderate-intensity exercise is any activity that gets you moving enough to burn at least three times more energy (calories) than if you were sitting.  Examples of moderate exercise include:  · Walking a mile in 15 minutes.  · Doing light yard work.  · Biking at an easy pace.  Most people should get at least 150 minutes (2 hours and 30 minutes) a week of moderate-intensity exercise to maintain their body weight.  Vigorous-intensity exercise  Vigorous-intensity exercise is any activity that gets you moving enough to burn at least six times more calories than if you  were sitting. When you exercise at this intensity, you should be working hard enough that you are not able to carry on a conversation.  Examples of vigorous exercise include:  · Running.  · Playing a team sport, such as football, basketball, and soccer.  · Jumping rope.  Most people should get at least 75 minutes (1 hour and 15 minutes) a week of vigorous-intensity exercise to maintain their body weight.  How can exercise affect me?  When you exercise enough to burn more calories than you eat, you lose weight. Exercise also reduces body fat and builds muscle. The more muscle you have, the more calories you burn. Exercise also:  · Improves mood.  · Reduces stress and tension.  · Improves your overall fitness, flexibility, and endurance.  · Increases bone strength.  The amount of exercise you need to lose weight depends on:  · Your age.  · The type of exercise.  · Any health conditions you have.  · Your overall physical ability.  Talk to your health care provider about how much exercise you need and what types of activities are safe for you.  What actions can I take to lose weight?  Nutrition    · Make changes to your diet as told by your health care provider or diet and nutrition specialist (dietitian). This may include:  ? Eating fewer calories.  ? Eating more protein.  ? Eating less unhealthy fats.  ? Eating a diet that includes fresh fruits and vegetables, whole grains, low-fat dairy products, and lean protein.  ? Avoiding foods with added fat, salt, and sugar.  · Drink plenty of water while you exercise to prevent dehydration or heat stroke.  Activity  · Choose an activity that you enjoy and set realistic goals. Your health care provider can help you make an exercise plan that works for you.  · Exercise at a moderate or vigorous intensity most days of the week.  ? The intensity of exercise may vary from person to person. You can tell how intense a workout is for you by paying attention to your breathing and  heartbeat. Most people will notice their breathing and heartbeat get faster with more intense exercise.  · Do resistance training twice each week, such as:  ? Push-ups.  ? Sit-ups.  ? Lifting weights.  ? Using resistance bands.  · Getting short amounts of exercise can be just as helpful as long structured periods of exercise. If you have trouble finding time to exercise, try to include exercise in your daily routine.  ? Get up, stretch, and walk around every 30 minutes throughout the day.  ? Go for a walk during your lunch break.  ? Park your car farther away from your destination.  ? If you take public transportation, get off one stop early and walk the rest of the way.  ? Make phone calls while standing up and walking around.  ? Take the stairs instead of elevators or escalators.  · Wear comfortable clothes and shoes with good support.  · Do not exercise so much that you hurt yourself, feel dizzy, or get very short of breath.  Where to find more information  · [place] Department of Health and Human Services: www.hhs.gov  · Centers for Disease Control and Prevention (CDC): www.cdc.gov  Contact a health care provider:  · Before starting a new exercise program.  · If you have questions or concerns about your weight.  · If you have a medical problem that keeps you from exercising.  Get help right away if you have any of the following while exercising:  · Injury.  · Dizziness.  · Difficulty breathing or shortness of breath that does not go away when you stop exercising.  · Chest pain.  · Rapid heartbeat.  Summary  · Being overweight increases your risk of heart disease, stroke, diabetes, high blood pressure, and several types of cancer.  · Losing weight happens when you burn more calories than you eat.  · Reducing the amount of calories you eat in addition to getting regular moderate or vigorous exercise each week helps you lose weight.  This information is not intended to replace advice given to you by your health care  provider. Make sure you discuss any questions you have with your health care provider.  Document Released: 01/20/2012 Document Revised: 12/31/2018 Document Reviewed: 12/31/2018  [employer] Patient Education © 2020 [employer]

## 2020-09-05 DIAGNOSIS — M1A.09X0 IDIOPATHIC CHRONIC GOUT OF MULTIPLE SITES WITHOUT TOPHUS: ICD-10-CM

## 2020-09-05 LAB
ALBUMIN SERPL-MCNC: 4.6 G/DL (ref 3.5–5.2)
ALBUMIN/CREAT UR: <3 MG/G CREAT (ref 0–29)
ALBUMIN/GLOB SERPL: 2.3 G/DL
ALP SERPL-CCNC: 82 U/L (ref 39–117)
ALT SERPL-CCNC: 32 U/L (ref 1–41)
AST SERPL-CCNC: 34 U/L (ref 1–40)
BASOPHILS # BLD AUTO: 0.01 10*3/MM3 (ref 0–0.2)
BASOPHILS NFR BLD AUTO: 0.2 % (ref 0–1.5)
BILIRUB SERPL-MCNC: 0.7 MG/DL (ref 0–1.2)
BUN SERPL-MCNC: 13 MG/DL (ref 6–20)
BUN/CREAT SERPL: 13.4 (ref 7–25)
CALCIUM SERPL-MCNC: 9 MG/DL (ref 8.6–10.5)
CHLORIDE SERPL-SCNC: 98 MMOL/L (ref 98–107)
CHOLEST SERPL-MCNC: 101 MG/DL (ref 0–200)
CO2 SERPL-SCNC: 22.7 MMOL/L (ref 22–29)
CREAT SERPL-MCNC: 0.97 MG/DL (ref 0.76–1.27)
CREAT UR-MCNC: 115.5 MG/DL
EOSINOPHIL # BLD AUTO: 0.1 10*3/MM3 (ref 0–0.4)
EOSINOPHIL NFR BLD AUTO: 2 % (ref 0.3–6.2)
ERYTHROCYTE [DISTWIDTH] IN BLOOD BY AUTOMATED COUNT: 14.1 % (ref 12.3–15.4)
GLOBULIN SER CALC-MCNC: 2 GM/DL
GLUCOSE SERPL-MCNC: 125 MG/DL (ref 65–99)
HBA1C MFR BLD: 6.3 % (ref 4.8–5.6)
HCT VFR BLD AUTO: 44.1 % (ref 37.5–51)
HDLC SERPL-MCNC: 30 MG/DL (ref 40–60)
HGB BLD-MCNC: 14.7 G/DL (ref 13–17.7)
IMM GRANULOCYTES # BLD AUTO: 0.01 10*3/MM3 (ref 0–0.05)
IMM GRANULOCYTES NFR BLD AUTO: 0.2 % (ref 0–0.5)
LDLC SERPL CALC-MCNC: 31 MG/DL (ref 0–100)
LYMPHOCYTES # BLD AUTO: 1.35 10*3/MM3 (ref 0.7–3.1)
LYMPHOCYTES NFR BLD AUTO: 26.9 % (ref 19.6–45.3)
MCH RBC QN AUTO: 29.6 PG (ref 26.6–33)
MCHC RBC AUTO-ENTMCNC: 33.3 G/DL (ref 31.5–35.7)
MCV RBC AUTO: 88.9 FL (ref 79–97)
MICROALBUMIN UR-MCNC: <3 UG/ML
MONOCYTES # BLD AUTO: 0.36 10*3/MM3 (ref 0.1–0.9)
MONOCYTES NFR BLD AUTO: 7.2 % (ref 5–12)
NEUTROPHILS # BLD AUTO: 3.19 10*3/MM3 (ref 1.7–7)
NEUTROPHILS NFR BLD AUTO: 63.5 % (ref 42.7–76)
NRBC BLD AUTO-RTO: 0 /100 WBC (ref 0–0.2)
PLATELET # BLD AUTO: 145 10*3/MM3 (ref 140–450)
POTASSIUM SERPL-SCNC: 3.7 MMOL/L (ref 3.5–5.2)
PROT SERPL-MCNC: 6.6 G/DL (ref 6–8.5)
PSA SERPL-MCNC: 0.3 NG/ML (ref 0–4)
RBC # BLD AUTO: 4.96 10*6/MM3 (ref 4.14–5.8)
SODIUM SERPL-SCNC: 137 MMOL/L (ref 136–145)
TESTOST SERPL-MCNC: 231 NG/DL (ref 264–916)
TRIGL SERPL-MCNC: 199 MG/DL (ref 0–150)
URATE SERPL-MCNC: 5.8 MG/DL (ref 3.4–7)
VLDLC SERPL CALC-MCNC: 39.8 MG/DL
WBC # BLD AUTO: 5.02 10*3/MM3 (ref 3.4–10.8)

## 2020-09-08 DIAGNOSIS — E11.42 TYPE 2 DIABETES MELLITUS WITH DIABETIC POLYNEUROPATHY, WITHOUT LONG-TERM CURRENT USE OF INSULIN (HCC): ICD-10-CM

## 2020-09-08 RX ORDER — EMPAGLIFLOZIN 25 MG/1
TABLET, FILM COATED ORAL
Qty: 30 TABLET | Refills: 5 | Status: SHIPPED | OUTPATIENT
Start: 2020-09-08 | End: 2021-03-24

## 2020-09-08 RX ORDER — IBUPROFEN 800 MG/1
TABLET ORAL
Qty: 90 TABLET | Refills: 5 | Status: SHIPPED | OUTPATIENT
Start: 2020-09-08 | End: 2021-12-27

## 2020-09-12 NOTE — PROGRESS NOTES
Diabetes is adequately controlled.  Cholesterol is adequately controlled.  Testosterone low, did he want to try topical or change injections?  Fatigue?  Rest of labs normal or stable.

## 2020-09-24 ENCOUNTER — TELEPHONE (OUTPATIENT)
Dept: FAMILY MEDICINE CLINIC | Facility: CLINIC | Age: 50
End: 2020-09-24

## 2020-09-24 DIAGNOSIS — E29.1 HYPOGONADISM IN MALE: Primary | ICD-10-CM

## 2020-09-24 RX ORDER — TESTOSTERONE 16.2 MG/G
4 GEL TRANSDERMAL DAILY
Qty: 176 G | Refills: 3 | Status: SHIPPED | OUTPATIENT
Start: 2020-09-24 | End: 2021-04-26

## 2020-09-24 NOTE — TELEPHONE ENCOUNTER
I sent in topical to try and repeat labs at next f/u appt.  If not covered, let us know.  RRJ    ----- Message from Shanita Mueller MA sent at 9/22/2020  5:10 PM EDT -----  Per pt's wife he has fatigue on and off. Pt's wife said whatever you think is what he will do. They are aware of results.

## 2020-10-09 DIAGNOSIS — F32.2 SEVERE SINGLE CURRENT EPISODE OF MAJOR DEPRESSIVE DISORDER, WITHOUT PSYCHOTIC FEATURES (HCC): ICD-10-CM

## 2020-10-09 RX ORDER — BUPROPION HYDROCHLORIDE 150 MG/1
TABLET ORAL
Qty: 30 TABLET | Refills: 4 | Status: SHIPPED | OUTPATIENT
Start: 2020-10-09 | End: 2021-04-07

## 2020-11-14 DIAGNOSIS — R60.0 EDEMA OF LOWER EXTREMITY: Primary | ICD-10-CM

## 2020-11-16 RX ORDER — FUROSEMIDE 40 MG/1
TABLET ORAL
Qty: 30 TABLET | Refills: 5 | Status: SHIPPED | OUTPATIENT
Start: 2020-11-16 | End: 2021-06-22

## 2020-11-17 ENCOUNTER — TELEPHONE (OUTPATIENT)
Dept: FAMILY MEDICINE CLINIC | Facility: CLINIC | Age: 50
End: 2020-11-17

## 2020-11-17 NOTE — TELEPHONE ENCOUNTER
Patient called and needs a leg pump. Tried to order in Jan or Feb but deductible was too high. He has met his deductible now and wants to continue with that process. Please call back and advise at [phone]

## 2020-11-17 NOTE — TELEPHONE ENCOUNTER
I just printed the orders, fax again.  Might need to see in office again,b ut can just try sending orders.

## 2021-01-06 DIAGNOSIS — E78.1 HYPERTRIGLYCERIDEMIA: ICD-10-CM

## 2021-01-06 DIAGNOSIS — E11.42 TYPE 2 DIABETES MELLITUS WITH DIABETIC POLYNEUROPATHY, WITHOUT LONG-TERM CURRENT USE OF INSULIN (HCC): ICD-10-CM

## 2021-01-06 RX ORDER — ATORVASTATIN CALCIUM 20 MG/1
TABLET, FILM COATED ORAL
Qty: 90 TABLET | Refills: 0 | Status: SHIPPED | OUTPATIENT
Start: 2021-01-06 | End: 2021-05-20 | Stop reason: SDUPTHER

## 2021-02-22 DIAGNOSIS — E11.42 DIABETIC PERIPHERAL NEUROPATHY (HCC): ICD-10-CM

## 2021-02-23 RX ORDER — GABAPENTIN 400 MG/1
CAPSULE ORAL
Qty: 90 CAPSULE | Refills: 3 | Status: SHIPPED | OUTPATIENT
Start: 2021-02-23 | End: 2021-09-14 | Stop reason: SDUPTHER

## 2021-03-11 ENCOUNTER — OFFICE VISIT (OUTPATIENT)
Dept: FAMILY MEDICINE CLINIC | Facility: CLINIC | Age: 51
End: 2021-03-11

## 2021-03-11 VITALS
HEART RATE: 88 BPM | HEIGHT: 75 IN | WEIGHT: 315 LBS | DIASTOLIC BLOOD PRESSURE: 64 MMHG | SYSTOLIC BLOOD PRESSURE: 112 MMHG | BODY MASS INDEX: 39.17 KG/M2 | OXYGEN SATURATION: 98 %

## 2021-03-11 DIAGNOSIS — E29.1 HYPOGONADISM IN MALE: ICD-10-CM

## 2021-03-11 DIAGNOSIS — I87.2 VENOUS STASIS DERMATITIS OF BOTH LOWER EXTREMITIES: ICD-10-CM

## 2021-03-11 DIAGNOSIS — E11.42 DIABETIC PERIPHERAL NEUROPATHY (HCC): ICD-10-CM

## 2021-03-11 DIAGNOSIS — E11.42 TYPE 2 DIABETES MELLITUS WITH DIABETIC POLYNEUROPATHY, WITHOUT LONG-TERM CURRENT USE OF INSULIN (HCC): Primary | ICD-10-CM

## 2021-03-11 DIAGNOSIS — E11.9 ENCOUNTER FOR DIABETIC FOOT EXAM (HCC): ICD-10-CM

## 2021-03-11 DIAGNOSIS — M1A.09X0 IDIOPATHIC CHRONIC GOUT OF MULTIPLE SITES WITHOUT TOPHUS: ICD-10-CM

## 2021-03-11 DIAGNOSIS — E78.1 HYPERTRIGLYCERIDEMIA: ICD-10-CM

## 2021-03-11 DIAGNOSIS — I89.0 LYMPHEDEMA: ICD-10-CM

## 2021-03-11 DIAGNOSIS — I10 BENIGN ESSENTIAL HYPERTENSION: ICD-10-CM

## 2021-03-11 DIAGNOSIS — M79.10 MYALGIA: ICD-10-CM

## 2021-03-11 PROCEDURE — 99214 OFFICE O/P EST MOD 30 MIN: CPT | Performed by: FAMILY MEDICINE

## 2021-03-11 RX ORDER — TIZANIDINE 4 MG/1
4 TABLET ORAL EVERY 8 HOURS PRN
Qty: 90 TABLET | Refills: 5 | Status: SHIPPED | OUTPATIENT
Start: 2021-03-11

## 2021-03-11 RX ORDER — AMMONIUM LACTATE 12 G/100G
CREAM TOPICAL AS NEEDED
Qty: 385 G | Refills: 5 | Status: SHIPPED | OUTPATIENT
Start: 2021-03-11 | End: 2021-09-14 | Stop reason: SDUPTHER

## 2021-03-11 NOTE — PROGRESS NOTES
Subjective   Marcial Desai is a 51 y.o. male. Presents today for   Chief Complaint   Patient presents with   • Diabetes   • Hypertension   • Hyperlipidemia   • Anxiety   • Hypogonadism       Diabetes  He presents for his follow-up diabetic visit. He has type 2 diabetes mellitus. His disease course has been stable. Associated symptoms include foot paresthesias. Pertinent negatives for diabetes include no chest pain.   Hypertension  This is a chronic problem. The current episode started more than 1 year ago. The problem is unchanged. The problem is controlled. Pertinent negatives include no chest pain, orthopnea, PND or shortness of breath. The current treatment provides moderate improvement.   Hyperlipidemia  This is a chronic problem. The current episode started more than 1 year ago. The problem is controlled. Recent lipid tests were reviewed and are normal. Exacerbating diseases include diabetes and obesity. Associated symptoms include myalgias (left leg). Pertinent negatives include no chest pain or shortness of breath. Current antihyperlipidemic treatment includes statins. The current treatment provides moderate improvement of lipids. Risk factors for coronary artery disease include dyslipidemia, diabetes mellitus, hypertension, male sex and obesity.     Lymphedema better with pump and stable;    No gout flares due check  Hypogonadism, doing well on testosteron no urinary symptoms, due for recheck  Flexeril a little relief of muscle spasms;    Review of Systems   Respiratory: Negative for shortness of breath.    Cardiovascular: Negative for chest pain, orthopnea and PND.   Musculoskeletal: Positive for myalgias (left leg).       Patient Active Problem List   Diagnosis   • Ankle pain   • Atopic dermatitis   • Benign essential hypertension   • Type 2 diabetes mellitus with diabetic polyneuropathy, without long-term current use of insulin (CMS/HCC)   • Diabetic peripheral neuropathy (CMS/HCC)   • Abnormal liver  function tests   • Idiopathic chronic gout of multiple sites without tophus   • Hypertriglyceridemia   • Cramps of lower extremity   • Edema of lower extremity   • Lymphedema   • Osteoarthritis   • Morbid obesity due to excess calories (CMS/HCC)   • Hypogonadism in male       Social History     Socioeconomic History   • Marital status:      Spouse name: Not on file   • Number of children: Not on file   • Years of education: Not on file   • Highest education level: Not on file   Tobacco Use   • Smoking status: Never Smoker   • Smokeless tobacco: Never Used   Substance and Sexual Activity   • Alcohol use: No   • Drug use: No       Allergies   Allergen Reactions   • Cortisone Shortness Of Breath       Current Outpatient Medications on File Prior to Visit   Medication Sig Dispense Refill   • ACCU-CHEK TYREL PLUS test strip TEST DAILY 50 each 2   • allopurinol (ZYLOPRIM) 300 MG tablet TAKE ONE TABLET BY MOUTH TWICE A DAY 60 tablet 4   • atorvastatin (LIPITOR) 20 MG tablet TAKE 1 TABLET BY MOUTH AT BEDTIME  90 tablet 0   • Blood Glucose Monitoring Suppl (ACCU-CHEK TYREL PLUS) w/Device kit      • buPROPion XL (WELLBUTRIN XL) 150 MG 24 hr tablet TAKE ONE TABLET BY MOUTH DAILY 30 tablet 4   • cetirizine (zyrTEC) 10 MG tablet TAKE ONE TABLET BY MOUTH DAILY 30 tablet 3   • chlorhexidine (PERIDEX) 0.12 % solution Apply 15 mL to the mouth or throat 2 (Two) Times a Day. Swish, gargle and spit 473 mL 12   • cyclobenzaprine (FLEXERIL) 10 MG tablet Take 1 tablet by mouth 3 (Three) Times a Day As Needed for Muscle Spasms. 90 tablet 0   • fluticasone (FLONASE) 50 MCG/ACT nasal spray PLACE 2 SPRAYS IN EACH NOSTRIL DAILY 16 g 0   • furosemide (LASIX) 40 MG tablet TAKE ONE TABLET BY MOUTH DAILY 30 tablet 5   • gabapentin (NEURONTIN) 400 MG capsule TAKE ONE CAPSULE BY MOUTH THREE TIMES A DAY 90 capsule 3   • glucose blood test strip Check once daily E11.9 non-iddm;  States [employer] covered;  Also disp alcohol swabs, lancets. 50  each 12   • glucose monitor monitoring kit Check once daily d x E11.9 non-iddm 1 each 0   • hydrocortisone 2.5 % cream Apply  topically 2 (two) times a day. Wipe bottom twice daily 28 g 0   • ibuprofen (ADVIL,MOTRIN) 800 MG tablet TAKE ONE TABLET BY MOUTH EVERY 8 HOURS AS NEEDED FOR MILD TO MODERATE PAIN 90 tablet 5   • JARDIANCE 25 MG tablet TAKE ONE TABLET BY MOUTH DAILY 30 tablet 5   • Lancets misc 1 Device Daily. Dispense brand covered by insurance. Dx. e11.9 100 each 12   • lisinopril (PRINIVIL,ZESTRIL) 10 MG tablet TAKE ONE TABLET BY MOUTH DAILY 90 tablet 3   • magnesium oxide (MAGOX) 400 (241.3 Mg) MG tablet tablet TAKE ONE TABLET BY MOUTH DAILY 30 tablet 9   • metFORMIN (GLUCOPHAGE) 1000 MG tablet TAKE ONE AND ONE-HALF TABLET BY MOUTH EVERY MORNING TAKE ONE TABLET BY MOUTH EVERY EVENING 76 tablet 2   • mupirocin (BACTROBAN) 2 % ointment APPLY TO AFFECTED AREA(S) TWO TIMES A DAY 22 each 5   • potassium chloride (K-DUR,KLOR-CON) 10 MEQ CR tablet TAKE THREE TABLETS BY MOUTH TWICE A DAY 54 tablet 0   • sildenafil (VIAGRA) 100 MG tablet 1/2 to 1 po daily prn ED 10 tablet 5   • Testosterone (AndroGel Pump) 20.25 MG/ACT (1.62%) gel Place 4 application on the skin as directed by provider Daily. (2 pumps each shoulder or inner thigh) 176 g 3   • Testosterone 20.25 MG/ACT (1.62%) gel APPLY 1 PUMP DAILY 75 g 2   • triamcinolone (KENALOG) 0.1 % cream APPLY A THIN LAYER TO AFFECTED AREA TWO TIMES A DAY 30 g 2   • TRULICITY 1.5 MG/0.5ML solution pen-injector INJECT 1.5MG UNDER THE SKIN INTO THE APPROPRIATE AREA AS DIRECTED EVERY 7 DAYS 5 pen 5   • [DISCONTINUED] urea (CARMOL) 40 % cream Apply  topically to the appropriate area as directed Daily. 2nd toe 60 g 5   • [DISCONTINUED] atorvastatin (LIPITOR) 20 MG tablet TAKE 1 TABLET BY MOUTH AT BEDTIME  90 tablet 0     Current Facility-Administered Medications on File Prior to Visit   Medication Dose Route Frequency Provider Last Rate Last Admin   • Testosterone Cypionate  "(DEPOTESTOTERONE CYPIONATE) injection 100 mg  100 mg Intramuscular Q14 Days Tesfaye Swartz DO   100 mg at 06/25/19 1220       Objective   Vitals:    03/11/21 0843   BP: 112/64   Pulse: 88   SpO2: 98%   Weight: (!) 162 kg (357 lb)   Height: 190.5 cm (75\")     Body mass index is 44.62 kg/m².    Physical Exam  Vitals and nursing note reviewed.   Constitutional:       Appearance: He is well-developed.   HENT:      Head: Normocephalic and atraumatic.   Neck:      Thyroid: No thyromegaly.      Vascular: No JVD.   Cardiovascular:      Rate and Rhythm: Normal rate and regular rhythm.      Heart sounds: Normal heart sounds. No murmur. No friction rub. No gallop.    Pulmonary:      Effort: Pulmonary effort is normal. No respiratory distress.      Breath sounds: Normal breath sounds. No wheezing or rales.   Abdominal:      General: Bowel sounds are normal. There is no distension.      Palpations: Abdomen is soft.      Tenderness: There is no abdominal tenderness. There is no guarding or rebound.   Musculoskeletal:      Cervical back: Neck supple.      Right lower leg: Edema (lymphedema) present.      Left lower leg: Edema (lymphedema) present.   Feet:      Comments: Diabetic foot exam and monofilament completed, see scanned report.      Skin:     General: Skin is warm and dry.   Neurological:      Mental Status: He is alert.   Psychiatric:         Behavior: Behavior normal.         Assessment/Plan   Diagnoses and all orders for this visit:    1. Type 2 diabetes mellitus with diabetic polyneuropathy, without long-term current use of insulin (CMS/[facility], located within [facility]) (Primary)  -     Microalbumin / Creatinine Urine Ratio - Urine, Clean Catch  -     Lipid Panel  -     Comprehensive Metabolic Panel  -     Hemoglobin A1c    2. Benign essential hypertension  -     Comprehensive Metabolic Panel    3. Hypertriglyceridemia  -     Lipid Panel  -     Comprehensive Metabolic Panel    4. Hypogonadism in male  -     CBC & Differential  -     " Testosterone  -     PSA DIAGNOSTIC    5. Idiopathic chronic gout of multiple sites without tophus  -     CBC & Differential  -     Uric Acid    6. Lymphedema  -     CBC & Differential    7. Diabetic peripheral neuropathy (CMS/HCC)    8. Myalgia  -     tiZANidine (ZANAFLEX) 4 MG tablet; Take 1 tablet by mouth Every 8 (Eight) Hours As Needed for Muscle Spasms.  Dispense: 90 tablet; Refill: 5    9. Venous stasis dermatitis of both lower extremities  -     ammonium lactate (Lac-Hydrin) 12 % cream; Apply  topically to the appropriate area as directed As Needed for Dry Skin (venosu stasis dermatitis).  Dispense: 385 g; Refill: 5    10. Encounter for diabetic foot exam (CMS/HCC)    -urea not ocvered per patient now;    -trial zanaflex as less sedation for muscle aches;  If owrsens may need hold statin  -dm2 - continue medications, recheck labs  -HLD - continue statin, recheck lipids.  -hypertension - controlled, continue medications  -continue lymphedema pump  counesled on covid 19 vaccine as high risk         -Follow up: 6 months and prn

## 2021-03-12 LAB
ALBUMIN SERPL-MCNC: 4.4 G/DL (ref 3.5–5.2)
ALBUMIN/CREAT UR: <4 MG/G CREAT (ref 0–29)
ALBUMIN/GLOB SERPL: 1.8 G/DL
ALP SERPL-CCNC: 87 U/L (ref 39–117)
ALT SERPL-CCNC: 33 U/L (ref 1–41)
AST SERPL-CCNC: 35 U/L (ref 1–40)
BASOPHILS # BLD AUTO: 0.01 10*3/MM3 (ref 0–0.2)
BASOPHILS NFR BLD AUTO: 0.2 % (ref 0–1.5)
BILIRUB SERPL-MCNC: 0.6 MG/DL (ref 0–1.2)
BUN SERPL-MCNC: 11 MG/DL (ref 6–20)
BUN/CREAT SERPL: 12.5 (ref 7–25)
CALCIUM SERPL-MCNC: 9.3 MG/DL (ref 8.6–10.5)
CHLORIDE SERPL-SCNC: 98 MMOL/L (ref 98–107)
CHOLEST SERPL-MCNC: 114 MG/DL (ref 0–200)
CO2 SERPL-SCNC: 24 MMOL/L (ref 22–29)
CREAT SERPL-MCNC: 0.88 MG/DL (ref 0.76–1.27)
CREAT UR-MCNC: 79.7 MG/DL
EOSINOPHIL # BLD AUTO: 0.08 10*3/MM3 (ref 0–0.4)
EOSINOPHIL NFR BLD AUTO: 1.3 % (ref 0.3–6.2)
ERYTHROCYTE [DISTWIDTH] IN BLOOD BY AUTOMATED COUNT: 13.5 % (ref 12.3–15.4)
GLOBULIN SER CALC-MCNC: 2.5 GM/DL
GLUCOSE SERPL-MCNC: 115 MG/DL (ref 65–99)
HBA1C MFR BLD: 6.7 % (ref 4.8–5.6)
HCT VFR BLD AUTO: 47.3 % (ref 37.5–51)
HDLC SERPL-MCNC: 33 MG/DL (ref 40–60)
HGB BLD-MCNC: 15.7 G/DL (ref 13–17.7)
IMM GRANULOCYTES # BLD AUTO: 0.01 10*3/MM3 (ref 0–0.05)
IMM GRANULOCYTES NFR BLD AUTO: 0.2 % (ref 0–0.5)
LDLC SERPL CALC-MCNC: 44 MG/DL (ref 0–100)
LYMPHOCYTES # BLD AUTO: 1.39 10*3/MM3 (ref 0.7–3.1)
LYMPHOCYTES NFR BLD AUTO: 22.7 % (ref 19.6–45.3)
MCH RBC QN AUTO: 29.1 PG (ref 26.6–33)
MCHC RBC AUTO-ENTMCNC: 33.2 G/DL (ref 31.5–35.7)
MCV RBC AUTO: 87.6 FL (ref 79–97)
MICROALBUMIN UR-MCNC: <3 UG/ML
MONOCYTES # BLD AUTO: 0.45 10*3/MM3 (ref 0.1–0.9)
MONOCYTES NFR BLD AUTO: 7.3 % (ref 5–12)
NEUTROPHILS # BLD AUTO: 4.19 10*3/MM3 (ref 1.7–7)
NEUTROPHILS NFR BLD AUTO: 68.3 % (ref 42.7–76)
NRBC BLD AUTO-RTO: 0 /100 WBC (ref 0–0.2)
PLATELET # BLD AUTO: 158 10*3/MM3 (ref 140–450)
POTASSIUM SERPL-SCNC: 4.1 MMOL/L (ref 3.5–5.2)
PROT SERPL-MCNC: 6.9 G/DL (ref 6–8.5)
PSA SERPL-MCNC: 0.26 NG/ML (ref 0–4)
RBC # BLD AUTO: 5.4 10*6/MM3 (ref 4.14–5.8)
SODIUM SERPL-SCNC: 138 MMOL/L (ref 136–145)
TESTOST SERPL-MCNC: 578 NG/DL (ref 264–916)
TRIGL SERPL-MCNC: 233 MG/DL (ref 0–150)
URATE SERPL-MCNC: 6.3 MG/DL (ref 3.4–7)
VLDLC SERPL CALC-MCNC: 37 MG/DL (ref 5–40)
WBC # BLD AUTO: 6.13 10*3/MM3 (ref 3.4–10.8)

## 2021-03-14 NOTE — PROGRESS NOTES
Mail copy of labs  Diabetes is well controlled.  Cholesterol is well  controlled.  Testosterone good range  PSA normal  Rest of labs normal or stable.

## 2021-03-24 RX ORDER — EMPAGLIFLOZIN 25 MG/1
TABLET, FILM COATED ORAL
Qty: 30 TABLET | Refills: 11 | Status: SHIPPED | OUTPATIENT
Start: 2021-03-24 | End: 2021-09-14 | Stop reason: SDUPTHER

## 2021-03-26 ENCOUNTER — BULK ORDERING (OUTPATIENT)
Dept: CASE MANAGEMENT | Facility: OTHER | Age: 51
End: 2021-03-26

## 2021-03-26 DIAGNOSIS — Z23 IMMUNIZATION DUE: ICD-10-CM

## 2021-04-07 DIAGNOSIS — E11.42 TYPE 2 DIABETES MELLITUS WITH DIABETIC POLYNEUROPATHY, WITHOUT LONG-TERM CURRENT USE OF INSULIN (HCC): ICD-10-CM

## 2021-04-07 DIAGNOSIS — E66.01 MORBID OBESITY DUE TO EXCESS CALORIES (HCC): ICD-10-CM

## 2021-04-07 DIAGNOSIS — L84 PRE-ULCERATIVE CORN OR CALLOUS: ICD-10-CM

## 2021-04-07 DIAGNOSIS — F32.2 SEVERE SINGLE CURRENT EPISODE OF MAJOR DEPRESSIVE DISORDER, WITHOUT PSYCHOTIC FEATURES (HCC): ICD-10-CM

## 2021-04-07 RX ORDER — DULAGLUTIDE 1.5 MG/.5ML
INJECTION, SOLUTION SUBCUTANEOUS
Qty: 5 PEN | Refills: 5 | Status: SHIPPED | OUTPATIENT
Start: 2021-04-07 | End: 2021-09-14 | Stop reason: SDUPTHER

## 2021-04-07 RX ORDER — UREA 40 %
CREAM (GRAM) TOPICAL
Qty: 28.35 EACH | Refills: 4 | OUTPATIENT
Start: 2021-04-07

## 2021-04-07 RX ORDER — BUPROPION HYDROCHLORIDE 150 MG/1
TABLET ORAL
Qty: 30 TABLET | Refills: 3 | Status: SHIPPED | OUTPATIENT
Start: 2021-04-07 | End: 2021-08-16

## 2021-04-24 DIAGNOSIS — E29.1 HYPOGONADISM IN MALE: ICD-10-CM

## 2021-04-26 RX ORDER — TESTOSTERONE 16.2 MG/G
GEL TRANSDERMAL
Qty: 176 G | Refills: 2 | Status: SHIPPED | OUTPATIENT
Start: 2021-04-26 | End: 2021-11-01

## 2021-05-19 DIAGNOSIS — E11.42 TYPE 2 DIABETES MELLITUS WITH DIABETIC POLYNEUROPATHY, WITHOUT LONG-TERM CURRENT USE OF INSULIN (HCC): ICD-10-CM

## 2021-05-19 DIAGNOSIS — L84 PRE-ULCERATIVE CORN OR CALLOUS: ICD-10-CM

## 2021-05-19 DIAGNOSIS — E78.1 HYPERTRIGLYCERIDEMIA: ICD-10-CM

## 2021-05-20 RX ORDER — ATORVASTATIN CALCIUM 20 MG/1
TABLET, FILM COATED ORAL
Qty: 30 TABLET | Refills: 9 | Status: SHIPPED | OUTPATIENT
Start: 2021-05-20 | End: 2021-07-02

## 2021-05-20 RX ORDER — LISINOPRIL 10 MG/1
TABLET ORAL
Qty: 90 TABLET | Refills: 3 | Status: SHIPPED | OUTPATIENT
Start: 2021-05-20 | End: 2021-09-14 | Stop reason: SDUPTHER

## 2021-05-20 RX ORDER — UREA 40 %
CREAM (GRAM) TOPICAL
Qty: 28.35 EACH | Refills: 4 | OUTPATIENT
Start: 2021-05-20

## 2021-05-20 RX ORDER — ALLOPURINOL 300 MG/1
TABLET ORAL
Qty: 60 TABLET | Refills: 9 | Status: SHIPPED | OUTPATIENT
Start: 2021-05-20 | End: 2022-03-28 | Stop reason: SDUPTHER

## 2021-06-21 DIAGNOSIS — L84 PRE-ULCERATIVE CORN OR CALLOUS: ICD-10-CM

## 2021-06-21 DIAGNOSIS — R60.0 EDEMA OF LOWER EXTREMITY: ICD-10-CM

## 2021-06-22 RX ORDER — UREA 40 %
CREAM (GRAM) TOPICAL
Qty: 28.35 EACH | Refills: 4 | OUTPATIENT
Start: 2021-06-22

## 2021-06-22 RX ORDER — FUROSEMIDE 40 MG/1
TABLET ORAL
Qty: 30 TABLET | Refills: 4 | Status: SHIPPED | OUTPATIENT
Start: 2021-06-22 | End: 2021-09-14 | Stop reason: SDUPTHER

## 2021-07-01 DIAGNOSIS — E11.42 TYPE 2 DIABETES MELLITUS WITH DIABETIC POLYNEUROPATHY, WITHOUT LONG-TERM CURRENT USE OF INSULIN (HCC): ICD-10-CM

## 2021-07-01 DIAGNOSIS — E78.1 HYPERTRIGLYCERIDEMIA: ICD-10-CM

## 2021-07-02 RX ORDER — ATORVASTATIN CALCIUM 20 MG/1
TABLET, FILM COATED ORAL
Qty: 90 TABLET | Refills: 0 | Status: SHIPPED | OUTPATIENT
Start: 2021-07-02 | End: 2021-09-14 | Stop reason: SDUPTHER

## 2021-08-15 DIAGNOSIS — F32.2 SEVERE SINGLE CURRENT EPISODE OF MAJOR DEPRESSIVE DISORDER, WITHOUT PSYCHOTIC FEATURES (HCC): ICD-10-CM

## 2021-08-15 DIAGNOSIS — L84 PRE-ULCERATIVE CORN OR CALLOUS: ICD-10-CM

## 2021-08-16 RX ORDER — BUPROPION HYDROCHLORIDE 150 MG/1
TABLET ORAL
Qty: 30 TABLET | Refills: 3 | Status: SHIPPED | OUTPATIENT
Start: 2021-08-16 | End: 2021-09-14 | Stop reason: SDUPTHER

## 2021-08-16 RX ORDER — UREA 40 %
CREAM (GRAM) TOPICAL
Qty: 28.35 EACH | OUTPATIENT
Start: 2021-08-16

## 2021-09-14 ENCOUNTER — OFFICE VISIT (OUTPATIENT)
Dept: FAMILY MEDICINE CLINIC | Facility: CLINIC | Age: 51
End: 2021-09-14

## 2021-09-14 VITALS
OXYGEN SATURATION: 98 % | HEIGHT: 75 IN | WEIGHT: 300 LBS | BODY MASS INDEX: 37.3 KG/M2 | HEART RATE: 71 BPM | DIASTOLIC BLOOD PRESSURE: 72 MMHG | SYSTOLIC BLOOD PRESSURE: 110 MMHG

## 2021-09-14 DIAGNOSIS — J30.1 SEASONAL ALLERGIC RHINITIS DUE TO POLLEN: ICD-10-CM

## 2021-09-14 DIAGNOSIS — I87.2 VENOUS STASIS DERMATITIS OF BOTH LOWER EXTREMITIES: ICD-10-CM

## 2021-09-14 DIAGNOSIS — E78.1 HYPERTRIGLYCERIDEMIA: ICD-10-CM

## 2021-09-14 DIAGNOSIS — E29.1 HYPOGONADISM IN MALE: ICD-10-CM

## 2021-09-14 DIAGNOSIS — Z23 IMMUNIZATION DUE: ICD-10-CM

## 2021-09-14 DIAGNOSIS — R60.0 EDEMA OF LOWER EXTREMITY: ICD-10-CM

## 2021-09-14 DIAGNOSIS — E11.42 DIABETIC PERIPHERAL NEUROPATHY (HCC): ICD-10-CM

## 2021-09-14 DIAGNOSIS — E11.42 TYPE 2 DIABETES MELLITUS WITH DIABETIC POLYNEUROPATHY, WITHOUT LONG-TERM CURRENT USE OF INSULIN (HCC): ICD-10-CM

## 2021-09-14 DIAGNOSIS — F32.2 SEVERE SINGLE CURRENT EPISODE OF MAJOR DEPRESSIVE DISORDER, WITHOUT PSYCHOTIC FEATURES (HCC): ICD-10-CM

## 2021-09-14 DIAGNOSIS — Z00.00 MEDICARE ANNUAL WELLNESS VISIT, SUBSEQUENT: Primary | ICD-10-CM

## 2021-09-14 DIAGNOSIS — N40.0 BENIGN PROSTATIC HYPERPLASIA WITHOUT LOWER URINARY TRACT SYMPTOMS: ICD-10-CM

## 2021-09-14 DIAGNOSIS — E66.01 MORBID OBESITY DUE TO EXCESS CALORIES (HCC): ICD-10-CM

## 2021-09-14 PROCEDURE — 1159F MED LIST DOCD IN RCRD: CPT | Performed by: FAMILY MEDICINE

## 2021-09-14 PROCEDURE — 90732 PPSV23 VACC 2 YRS+ SUBQ/IM: CPT | Performed by: FAMILY MEDICINE

## 2021-09-14 PROCEDURE — G0439 PPPS, SUBSEQ VISIT: HCPCS | Performed by: FAMILY MEDICINE

## 2021-09-14 PROCEDURE — 99214 OFFICE O/P EST MOD 30 MIN: CPT | Performed by: FAMILY MEDICINE

## 2021-09-14 PROCEDURE — 1170F FXNL STATUS ASSESSED: CPT | Performed by: FAMILY MEDICINE

## 2021-09-14 PROCEDURE — G0009 ADMIN PNEUMOCOCCAL VACCINE: HCPCS | Performed by: FAMILY MEDICINE

## 2021-09-14 PROCEDURE — 1126F AMNT PAIN NOTED NONE PRSNT: CPT | Performed by: FAMILY MEDICINE

## 2021-09-14 RX ORDER — CETIRIZINE HYDROCHLORIDE 10 MG/1
10 TABLET ORAL DAILY
Qty: 90 TABLET | Refills: 3 | Status: SHIPPED | OUTPATIENT
Start: 2021-09-14 | End: 2022-03-21 | Stop reason: SDUPTHER

## 2021-09-14 RX ORDER — LISINOPRIL 10 MG/1
10 TABLET ORAL DAILY
Qty: 90 TABLET | Refills: 3 | Status: SHIPPED | OUTPATIENT
Start: 2021-09-14 | End: 2022-11-04 | Stop reason: SDUPTHER

## 2021-09-14 RX ORDER — FLUTICASONE PROPIONATE 50 MCG
2 SPRAY, SUSPENSION (ML) NASAL DAILY
Qty: 48 G | Refills: 3 | Status: SHIPPED | OUTPATIENT
Start: 2021-09-14

## 2021-09-14 RX ORDER — FUROSEMIDE 40 MG/1
40 TABLET ORAL DAILY
Qty: 90 TABLET | Refills: 3 | Status: SHIPPED | OUTPATIENT
Start: 2021-09-14 | End: 2022-09-04

## 2021-09-14 RX ORDER — CYCLOBENZAPRINE HCL 10 MG
10 TABLET ORAL 3 TIMES DAILY PRN
Qty: 90 TABLET | Refills: 5 | Status: SHIPPED | OUTPATIENT
Start: 2021-09-14

## 2021-09-14 RX ORDER — ATORVASTATIN CALCIUM 20 MG/1
20 TABLET, FILM COATED ORAL
Qty: 90 TABLET | Refills: 3 | Status: SHIPPED | OUTPATIENT
Start: 2021-09-14 | End: 2021-10-04

## 2021-09-14 RX ORDER — GABAPENTIN 400 MG/1
400 CAPSULE ORAL 3 TIMES DAILY
Qty: 270 CAPSULE | Refills: 1 | Status: SHIPPED | OUTPATIENT
Start: 2021-09-14 | End: 2022-03-07

## 2021-09-14 RX ORDER — TRIAMCINOLONE ACETONIDE 1 MG/G
CREAM TOPICAL 2 TIMES DAILY
Qty: 30 G | Refills: 2 | Status: SHIPPED | OUTPATIENT
Start: 2021-09-14

## 2021-09-14 RX ORDER — AMMONIUM LACTATE 12 G/100G
CREAM TOPICAL AS NEEDED
Qty: 385 G | Refills: 5 | Status: SHIPPED | OUTPATIENT
Start: 2021-09-14

## 2021-09-14 RX ORDER — BUPROPION HYDROCHLORIDE 150 MG/1
150 TABLET ORAL DAILY
Qty: 90 TABLET | Refills: 3 | Status: SHIPPED | OUTPATIENT
Start: 2021-09-14 | End: 2022-09-04

## 2021-09-14 RX ORDER — DULAGLUTIDE 1.5 MG/.5ML
1.5 INJECTION, SOLUTION SUBCUTANEOUS WEEKLY
Qty: 5 PEN | Refills: 5 | Status: SHIPPED | OUTPATIENT
Start: 2021-09-14 | End: 2022-09-23

## 2021-09-14 NOTE — PROGRESS NOTES
QUICK REFERENCE INFORMATION:  The ABCs of the Annual Wellness Visit    Subsequent Medicare Wellness Visit    HEALTH RISK ASSESSMENT    1970    Recent Hospitalizations:  No hospitalization(s) within the last year..        Current Medical Providers:  Patient Care Team:  Tesfaye Swartz DO as PCP - General        Smoking Status:  Social History     Tobacco Use   Smoking Status Never Smoker   Smokeless Tobacco Never Used       Alcohol Consumption:  Social History     Substance and Sexual Activity   Alcohol Use No       Depression Screen:   PHQ-2/PHQ-9 Depression Screening 9/14/2021   Little interest or pleasure in doing things 0   Feeling down, depressed, or hopeless 0   Trouble falling or staying asleep, or sleeping too much 0   Feeling tired or having little energy 0   Poor appetite or overeating 0   Feeling bad about yourself - or that you are a failure or have let yourself or your family down 0   Trouble concentrating on things, such as reading the newspaper or watching television 0   Moving or speaking so slowly that other people could have noticed. Or the opposite - being so fidgety or restless that you have been moving around a lot more than usual 0   Thoughts that you would be better off dead, or of hurting yourself in some way 0   Total Score 0   If you checked off any problems, how difficult have these problems made it for you to do your work, take care of things at home, or get along with other people? Not difficult at all       Health Habits and Functional and Cognitive Screening:  Functional & Cognitive Status 9/14/2021   Do you have difficulty preparing food and eating? No   Do you have difficulty bathing yourself, getting dressed or grooming yourself? No   Do you have difficulty using the toilet? No   Do you have difficulty moving around from place to place? No   Do you have trouble with steps or getting out of a bed or a chair? No   Current Diet Well Balanced Diet   Dental Exam Not up to date    Eye Exam Up to date   Exercise (times per week) 5 times per week   Current Exercises Include Walking   Current Exercise Activities Include -   Do you need help using the phone?  No   Are you deaf or do you have serious difficulty hearing?  No   Do you need help with transportation? No   Do you need help shopping? No   Do you need help preparing meals?  No   Do you need help with housework?  No   Do you need help with laundry? No   Do you need help taking your medications? No   Do you need help managing money? No   Do you ever drive or ride in a car without wearing a seat belt? No   Have you felt unusual stress, anger or loneliness in the last month? No   Who do you live with? Spouse   If you need help, do you have trouble finding someone available to you? No   Have you been bothered in the last four weeks by sexual problems? No   Do you have difficulty concentrating, remembering or making decisions? No           Does the patient have evidence of cognitive impairment? No    Aspirin use counseling: Taking ASA appropriately as indicated      Recent Lab Results:  CMP:  Lab Results   Component Value Date     (H) 03/11/2021    BUN 11 03/11/2021    CREATININE 0.88 03/11/2021    EGFRIFNONA 92 03/11/2021    EGFRIFAFRI 111 03/11/2021    BCR 12.5 03/11/2021     03/11/2021    K 4.1 03/11/2021    CO2 24.0 03/11/2021    CALCIUM 9.3 03/11/2021    PROTENTOTREF 6.9 03/11/2021    ALBUMIN 4.40 03/11/2021    LABGLOBREF 2.5 03/11/2021    LABIL2 1.8 03/11/2021    BILITOT 0.6 03/11/2021    ALKPHOS 87 03/11/2021    AST 35 03/11/2021    ALT 33 03/11/2021     Lipid Panel:  Lab Results   Component Value Date    TRIG 233 (H) 03/11/2021    HDL 33 (L) 03/11/2021    VLDL 37 03/11/2021     HbA1c:  Lab Results   Component Value Date    HGBA1C 6.70 (H) 03/11/2021       Visual Acuity:  No exam data present    Age-appropriate Screening Schedule:  Refer to the list below for future screening recommendations based on patient's age, sex  and/or medical conditions. Orders for these recommended tests are listed in the plan section. The patient has been provided with a written plan.    Health Maintenance   Topic Date Due   • DIABETIC EYE EXAM  08/05/2020   • ZOSTER VACCINE (1 of 2) Never done   • HEMOGLOBIN A1C  09/11/2021   • INFLUENZA VACCINE  10/01/2021   • DIABETIC FOOT EXAM  03/11/2022   • LIPID PANEL  03/11/2022   • URINE MICROALBUMIN  03/11/2022   • TDAP/TD VACCINES (2 - Td or Tdap) 01/19/2026        Subjective   History of Present Illness    Marcial Desai is a 51 y.o. male who presents for an Subsequent Wellness Visit.    The following portions of the patient's history were reviewed and updated as appropriate: allergies, current medications, past family history, past medical history, past social history, past surgical history and problem list.    Outpatient Medications Prior to Visit   Medication Sig Dispense Refill   • ACCU-CHEK TYREL PLUS test strip TEST DAILY 50 each 2   • allopurinol (ZYLOPRIM) 300 MG tablet TAKE ONE TABLET BY MOUTH TWICE A DAY 60 tablet 9   • Blood Glucose Monitoring Suppl (ACCU-CHEK TYREL PLUS) w/Device kit      • glucose blood test strip Check once daily E11.9 non-iddm;  States [employer] covered;  Also disp alcohol swabs, lancets. 50 each 12   • glucose monitor monitoring kit Check once daily d x E11.9 non-iddm 1 each 0   • hydrocortisone 2.5 % cream Apply  topically 2 (two) times a day. Wipe bottom twice daily 28 g 0   • ibuprofen (ADVIL,MOTRIN) 800 MG tablet TAKE ONE TABLET BY MOUTH EVERY 8 HOURS AS NEEDED FOR MILD TO MODERATE PAIN 90 tablet 5   • Lancets misc 1 Device Daily. Dispense brand covered by insurance. Dx. e11.9 100 each 12   • magnesium oxide (MAGOX) 400 (241.3 Mg) MG tablet tablet TAKE ONE TABLET BY MOUTH DAILY 30 tablet 9   • metFORMIN (GLUCOPHAGE) 1000 MG tablet TAKE 1 AND 1/2 TABLETS BY MOUTH IN THE MORNING then take 1 tablet in the evening 235 tablet 3   • potassium chloride (K-DUR,KLOR-CON) 10 MEQ CR  tablet TAKE THREE TABLETS BY MOUTH TWICE A DAY 54 tablet 0   • sildenafil (VIAGRA) 100 MG tablet 1/2 to 1 po daily prn ED 10 tablet 5   • Testosterone 20.25 MG/ACT (1.62%) gel APPLY 1 PUMP DAILY 75 g 2   • Testosterone 20.25 MG/ACT (1.62%) gel APPLY 4 PUMP ON THE SKIN DAILY AS DIRECTED BY PROVIDER (2 PUMPS EACH SHOULDER OR INNER THIGH) 176 g 2   • tiZANidine (ZANAFLEX) 4 MG tablet Take 1 tablet by mouth Every 8 (Eight) Hours As Needed for Muscle Spasms. 90 tablet 5   • ammonium lactate (Lac-Hydrin) 12 % cream Apply  topically to the appropriate area as directed As Needed for Dry Skin (venosu stasis dermatitis). 385 g 5   • atorvastatin (LIPITOR) 20 MG tablet TAKE 1 TABLET BY MOUTH AT BEDTIME  90 tablet 0   • buPROPion XL (WELLBUTRIN XL) 150 MG 24 hr tablet TAKE ONE TABLET BY MOUTH DAILY 30 tablet 3   • cetirizine (zyrTEC) 10 MG tablet TAKE ONE TABLET BY MOUTH DAILY 30 tablet 3   • cyclobenzaprine (FLEXERIL) 10 MG tablet Take 1 tablet by mouth 3 (Three) Times a Day As Needed for Muscle Spasms. 90 tablet 0   • fluticasone (FLONASE) 50 MCG/ACT nasal spray PLACE 2 SPRAYS IN EACH NOSTRIL DAILY 16 g 0   • furosemide (LASIX) 40 MG tablet TAKE ONE TABLET BY MOUTH DAILY 30 tablet 4   • gabapentin (NEURONTIN) 400 MG capsule TAKE ONE CAPSULE BY MOUTH THREE TIMES A DAY 90 capsule 3   • Jardiance 25 MG tablet TAKE ONE TABLET BY MOUTH DAILY 30 tablet 11   • lisinopril (PRINIVIL,ZESTRIL) 10 MG tablet TAKE ONE TABLET BY MOUTH DAILY 90 tablet 3   • mupirocin (BACTROBAN) 2 % ointment APPLY TO AFFECTED AREA(S) TWO TIMES A DAY 22 each 5   • triamcinolone (KENALOG) 0.1 % cream APPLY A THIN LAYER TO AFFECTED AREA TWO TIMES A DAY 30 g 2   • Trulicity 1.5 MG/0.5ML solution pen-injector INJECT 1.5 MG UNDER THE SKIN ONCE WEEKLY 5 pen 5   • chlorhexidine (PERIDEX) 0.12 % solution Apply 15 mL to the mouth or throat 2 (Two) Times a Day. Swish, gargle and spit 473 mL 12     Facility-Administered Medications Prior to Visit   Medication Dose Route  Frequency Provider Last Rate Last Admin   • Testosterone Cypionate (DEPOTESTOTERONE CYPIONATE) injection 100 mg  100 mg Intramuscular Q14 Days Maxime Tesfaye DO HUMA   100 mg at 06/25/19 1220       Patient Active Problem List   Diagnosis   • Ankle pain   • Atopic dermatitis   • Benign essential hypertension   • Type 2 diabetes mellitus with diabetic polyneuropathy, without long-term current use of insulin (CMS/HCC)   • Diabetic peripheral neuropathy (CMS/HCC)   • Abnormal liver function tests   • Idiopathic chronic gout of multiple sites without tophus   • Hypertriglyceridemia   • Cramps of lower extremity   • Edema of lower extremity   • Lymphedema   • Osteoarthritis   • Morbid obesity due to excess calories (CMS/HCC)   • Hypogonadism in male       Advance Care Planning:  ACP discussion was held with the patient during this visit. Patient does not have an advance directive, information provided.    Identification of Risk Factors:  Risk factors include: Obesity/Overweight .    Review of Systems   Respiratory: Negative for shortness of breath.    Cardiovascular: Negative for chest pain, palpitations, orthopnea and PND.       Compared to one year ago, the patient feels his physical health is the same.  Compared to one year ago, the patient feels his mental health is the same.    Objective     Physical Exam  Vitals and nursing note reviewed.   Constitutional:       Appearance: He is well-developed.   HENT:      Head: Normocephalic and atraumatic.   Neck:      Thyroid: No thyromegaly.      Vascular: No JVD.   Cardiovascular:      Rate and Rhythm: Normal rate and regular rhythm.      Heart sounds: Normal heart sounds. No murmur heard.   No friction rub. No gallop.    Pulmonary:      Effort: Pulmonary effort is normal. No respiratory distress.      Breath sounds: Normal breath sounds. No wheezing or rales.   Abdominal:      General: Bowel sounds are normal. There is no distension.      Palpations: Abdomen is soft.       "Tenderness: There is no abdominal tenderness. There is no guarding or rebound.   Musculoskeletal:      Cervical back: Neck supple.      Right lower leg: Edema present.      Left lower leg: Edema present.   Lymphadenopathy:      Comments: Varicose veins b/l legs;  lymphedema   Skin:     General: Skin is warm and dry.   Neurological:      Mental Status: He is alert.   Psychiatric:         Behavior: Behavior normal.         Vitals:    09/14/21 0917   BP: 110/72   Pulse: 71   SpO2: 98%   Weight: 136 kg (300 lb)   Height: 190.5 cm (75\")   PainSc: 0-No pain       Patient's Body mass index is 37.5 kg/m². indicating that he is obese (BMI >30). Obesity-related health conditions include the following: hypertension, diabetes mellitus and dyslipidemias. Obesity is unchanged. BMI is is above average; BMI management plan is completed. We discussed portion control and increasing exercise..      Assessment/Plan   Patient Self-Management and Personalized Health Advice  The patient has been provided with information about: diet and exercise and preventive services including:   · Annual Wellness Visit (AWV).    Visit Diagnoses:    ICD-10-CM ICD-9-CM   1. Type 2 diabetes mellitus with diabetic polyneuropathy, without long-term current use of insulin (CMS/[facility])  E11.42 250.60     357.2   2. Venous stasis dermatitis of both lower extremities  I87.2 454.1   3. Edema of lower extremity  R60.0 782.3   4. Hypertriglyceridemia  E78.1 272.1   5. Severe single current episode of major depressive disorder, without psychotic features (CMS/[facility])  F32.2 296.23   6. Seasonal allergic rhinitis due to pollen  J30.1 477.0   7. Diabetic peripheral neuropathy (CMS/[facility])  E11.42 250.60     357.2   8. Morbid obesity due to excess calories (CMS/[facility])  E66.01 278.01   9. Hypogonadism in male  E29.1 257.2   10. Benign prostatic hyperplasia without lower urinary tract symptoms  N40.0 600.00       Orders Placed This Encounter   Procedures   • Hemoglobin A1c     " Order Specific Question:   Release to patient     Answer:   Immediate   • Comprehensive Metabolic Panel     Order Specific Question:   Release to patient     Answer:   Immediate   • Testosterone     Order Specific Question:   Release to patient     Answer:   Immediate   • PSA DIAGNOSTIC     Order Specific Question:   Release to patient     Answer:   Immediate   • Lipid Panel   • CBC & Differential     Order Specific Question:   Manual Differential     Answer:   No       Outpatient Encounter Medications as of 9/14/2021   Medication Sig Dispense Refill   • ACCU-CHEK TYREL PLUS test strip TEST DAILY 50 each 2   • allopurinol (ZYLOPRIM) 300 MG tablet TAKE ONE TABLET BY MOUTH TWICE A DAY 60 tablet 9   • ammonium lactate (Lac-Hydrin) 12 % cream Apply  topically to the appropriate area as directed As Needed for Dry Skin (venosu stasis dermatitis). 385 g 5   • atorvastatin (LIPITOR) 20 MG tablet Take 1 tablet by mouth every night at bedtime. 90 tablet 3   • Blood Glucose Monitoring Suppl (ACCU-CHEK TYREL PLUS) w/Device kit      • buPROPion XL (WELLBUTRIN XL) 150 MG 24 hr tablet Take 1 tablet by mouth Daily. 90 tablet 3   • cetirizine (zyrTEC) 10 MG tablet Take 1 tablet by mouth Daily. 90 tablet 3   • cyclobenzaprine (FLEXERIL) 10 MG tablet Take 1 tablet by mouth 3 (Three) Times a Day As Needed for Muscle Spasms. 90 tablet 5   • Dulaglutide (Trulicity) 1.5 MG/0.5ML solution pen-injector Inject 1.5 mg under the skin into the appropriate area as directed 1 (One) Time Per Week. 5 pen 5   • empagliflozin (Jardiance) 25 MG tablet tablet Take 1 tablet by mouth Daily. 90 tablet 3   • fluticasone (FLONASE) 50 MCG/ACT nasal spray 2 sprays by Each Nare route Daily. 48 g 3   • furosemide (LASIX) 40 MG tablet Take 1 tablet by mouth Daily. 90 tablet 3   • gabapentin (NEURONTIN) 400 MG capsule Take 1 capsule by mouth 3 (Three) Times a Day. 270 capsule 1   • glucose blood test strip Check once daily E11.9 non-iddm;  States [employer]  covered;  Also disp alcohol swabs, lancets. 50 each 12   • glucose monitor monitoring kit Check once daily d x E11.9 non-iddm 1 each 0   • hydrocortisone 2.5 % cream Apply  topically 2 (two) times a day. Wipe bottom twice daily 28 g 0   • ibuprofen (ADVIL,MOTRIN) 800 MG tablet TAKE ONE TABLET BY MOUTH EVERY 8 HOURS AS NEEDED FOR MILD TO MODERATE PAIN 90 tablet 5   • Lancets misc 1 Device Daily. Dispense brand covered by insurance. Dx. e11.9 100 each 12   • lisinopril (PRINIVIL,ZESTRIL) 10 MG tablet Take 1 tablet by mouth Daily. 90 tablet 3   • magnesium oxide (MAGOX) 400 (241.3 Mg) MG tablet tablet TAKE ONE TABLET BY MOUTH DAILY 30 tablet 9   • metFORMIN (GLUCOPHAGE) 1000 MG tablet TAKE 1 AND 1/2 TABLETS BY MOUTH IN THE MORNING then take 1 tablet in the evening 235 tablet 3   • mupirocin (BACTROBAN) 2 % ointment Apply  topically to the appropriate area as directed 2 (Two) Times a Day. to affected area(s) 30 each 5   • potassium chloride (K-DUR,KLOR-CON) 10 MEQ CR tablet TAKE THREE TABLETS BY MOUTH TWICE A DAY 54 tablet 0   • sildenafil (VIAGRA) 100 MG tablet 1/2 to 1 po daily prn ED 10 tablet 5   • Testosterone 20.25 MG/ACT (1.62%) gel APPLY 1 PUMP DAILY 75 g 2   • Testosterone 20.25 MG/ACT (1.62%) gel APPLY 4 PUMP ON THE SKIN DAILY AS DIRECTED BY PROVIDER (2 PUMPS EACH SHOULDER OR INNER THIGH) 176 g 2   • tiZANidine (ZANAFLEX) 4 MG tablet Take 1 tablet by mouth Every 8 (Eight) Hours As Needed for Muscle Spasms. 90 tablet 5   • triamcinolone (KENALOG) 0.1 % cream Apply  topically to the appropriate area as directed 2 (Two) Times a Day. 30 g 2   • [DISCONTINUED] ammonium lactate (Lac-Hydrin) 12 % cream Apply  topically to the appropriate area as directed As Needed for Dry Skin (venosu stasis dermatitis). 385 g 5   • [DISCONTINUED] atorvastatin (LIPITOR) 20 MG tablet TAKE 1 TABLET BY MOUTH AT BEDTIME  90 tablet 0   • [DISCONTINUED] buPROPion XL (WELLBUTRIN XL) 150 MG 24 hr tablet TAKE ONE TABLET BY MOUTH DAILY 30  tablet 3   • [DISCONTINUED] cetirizine (zyrTEC) 10 MG tablet TAKE ONE TABLET BY MOUTH DAILY 30 tablet 3   • [DISCONTINUED] cyclobenzaprine (FLEXERIL) 10 MG tablet Take 1 tablet by mouth 3 (Three) Times a Day As Needed for Muscle Spasms. 90 tablet 0   • [DISCONTINUED] fluticasone (FLONASE) 50 MCG/ACT nasal spray PLACE 2 SPRAYS IN EACH NOSTRIL DAILY 16 g 0   • [DISCONTINUED] furosemide (LASIX) 40 MG tablet TAKE ONE TABLET BY MOUTH DAILY 30 tablet 4   • [DISCONTINUED] gabapentin (NEURONTIN) 400 MG capsule TAKE ONE CAPSULE BY MOUTH THREE TIMES A DAY 90 capsule 3   • [DISCONTINUED] Jardiance 25 MG tablet TAKE ONE TABLET BY MOUTH DAILY 30 tablet 11   • [DISCONTINUED] lisinopril (PRINIVIL,ZESTRIL) 10 MG tablet TAKE ONE TABLET BY MOUTH DAILY 90 tablet 3   • [DISCONTINUED] mupirocin (BACTROBAN) 2 % ointment APPLY TO AFFECTED AREA(S) TWO TIMES A DAY 22 each 5   • [DISCONTINUED] triamcinolone (KENALOG) 0.1 % cream APPLY A THIN LAYER TO AFFECTED AREA TWO TIMES A DAY 30 g 2   • [DISCONTINUED] Trulicity 1.5 MG/0.5ML solution pen-injector INJECT 1.5 MG UNDER THE SKIN ONCE WEEKLY 5 pen 5   • [DISCONTINUED] atorvastatin (LIPITOR) 20 MG tablet TAKE 1 TABLET BY MOUTH AT BEDTIME  90 tablet 0   • [DISCONTINUED] chlorhexidine (PERIDEX) 0.12 % solution Apply 15 mL to the mouth or throat 2 (Two) Times a Day. Swish, gargle and spit 473 mL 12     Facility-Administered Encounter Medications as of 9/14/2021   Medication Dose Route Frequency Provider Last Rate Last Admin   • Testosterone Cypionate (DEPOTESTOTERONE CYPIONATE) injection 100 mg  100 mg Intramuscular Q14 Days Tesfaye Swartz DO   100 mg at 06/25/19 1220       Reviewed use of high risk medication in the elderly: yes  Reviewed for potential of harmful drug interactions in the elderly: yes    Follow Up:  No follow-ups on file.     An After Visit Summary and PPPS with all of these plans were given to the patient.        "    ++++++++++++++++++++++++++++++++++++++++++++++++++++++++++++++++++     Chief Complaint   Patient presents with   • Annual Exam     medicare   • Diabetes   • Hypertension   • Hyperlipidemia   • Colonoscopy     Needs Order     Diabetes  He presents for his follow-up diabetic visit. He has type 2 diabetes mellitus. His disease course has been stable. Associated symptoms include foot paresthesias. Pertinent negatives for diabetes include no chest pain. Symptoms are stable. Diabetic complications include peripheral neuropathy (on gabapentin with relief).   Hypertension  This is a chronic problem. The current episode started more than 1 year ago. The problem is unchanged. The problem is controlled. Pertinent negatives include no chest pain, orthopnea, palpitations, peripheral edema, PND or shortness of breath. The current treatment provides moderate improvement.   Hyperlipidemia  This is a chronic problem. The current episode started more than 1 year ago. The problem is controlled. Recent lipid tests were reviewed and are normal. Exacerbating diseases include diabetes and obesity. Pertinent negatives include no chest pain or shortness of breath. Current antihyperlipidemic treatment includes statins. The current treatment provides moderate improvement of lipids.     Fell hit back, bursing and sore;  Doing better    Lymphedema and varicose veins severe;  Has pump helps;      No gout attacks;      Review of Systems   Cardiovascular: Negative for chest pain, orthopnea, palpitations and paroxysmal nocturnal dyspnea.   Respiratory: Negative for shortness of breath.        Social History     Tobacco Use   • Smoking status: Never Smoker   • Smokeless tobacco: Never Used   Substance Use Topics   • Alcohol use: No     O:   Vitals:    09/14/21 0917   BP: 110/72   Pulse: 71   SpO2: 98%   Weight: 136 kg (300 lb)   Height: 190.5 cm (75\")   PainSc: 0-No pain     Body mass index is 37.5 kg/m².  Vitals and nursing note reviewed. "   Constitutional:       Appearance: Well-developed.   HENT:      Head: Normocephalic and atraumatic.   Neck:      Thyroid: No thyromegaly.      Vascular: No JVD.   Lymphadenopathy:        Comments: Varicose veins b/l legs;  lymphedema   Pulmonary:      Effort: Pulmonary effort is normal. No respiratory distress.      Breath sounds: Normal breath sounds. No wheezing. No rales.   Cardiovascular:      Normal rate. Regular rhythm. Normal heart sounds.      No gallop. No friction rub.   Edema:     Pretibial: edema of the left pretibial area and edema of the right pretibial area.  Abdominal:      General: Bowel sounds are normal. There is no distension.      Palpations: Abdomen is soft.      Tenderness: There is no abdominal tenderness. There is no guarding or rebound.   Musculoskeletal:      Cervical back: Neck supple. Skin:     General: Skin is warm and dry.   Neurological:      Mental Status: Alert.   Psychiatric:         Behavior: Behavior normal.         Diagnoses and all orders for this visit:    1. Type 2 diabetes mellitus with diabetic polyneuropathy, without long-term current use of insulin (CMS/[facility]) (Primary)  -     atorvastatin (LIPITOR) 20 MG tablet; Take 1 tablet by mouth every night at bedtime.  Dispense: 90 tablet; Refill: 3  -     Dulaglutide (Trulicity) 1.5 MG/0.5ML solution pen-injector; Inject 1.5 mg under the skin into the appropriate area as directed 1 (One) Time Per Week.  Dispense: 5 pen; Refill: 5  -     Hemoglobin A1c  -     Comprehensive Metabolic Panel    2. Venous stasis dermatitis of both lower extremities  -     ammonium lactate (Lac-Hydrin) 12 % cream; Apply  topically to the appropriate area as directed As Needed for Dry Skin (venosu stasis dermatitis).  Dispense: 385 g; Refill: 5    3. Edema of lower extremity  -     mupirocin (BACTROBAN) 2 % ointment; Apply  topically to the appropriate area as directed 2 (Two) Times a Day. to affected area(s)  Dispense: 30 each; Refill: 5  -      furosemide (LASIX) 40 MG tablet; Take 1 tablet by mouth Daily.  Dispense: 90 tablet; Refill: 3    4. Hypertriglyceridemia  -     atorvastatin (LIPITOR) 20 MG tablet; Take 1 tablet by mouth every night at bedtime.  Dispense: 90 tablet; Refill: 3  -     Lipid Panel    5. Severe single current episode of major depressive disorder, without psychotic features (CMS/HCC)  -     buPROPion XL (WELLBUTRIN XL) 150 MG 24 hr tablet; Take 1 tablet by mouth Daily.  Dispense: 90 tablet; Refill: 3    6. Seasonal allergic rhinitis due to pollen  -     cetirizine (zyrTEC) 10 MG tablet; Take 1 tablet by mouth Daily.  Dispense: 90 tablet; Refill: 3  -     fluticasone (FLONASE) 50 MCG/ACT nasal spray; 2 sprays by Each Nare route Daily.  Dispense: 48 g; Refill: 3    7. Diabetic peripheral neuropathy (CMS/[facility])  -     gabapentin (NEURONTIN) 400 MG capsule; Take 1 capsule by mouth 3 (Three) Times a Day.  Dispense: 270 capsule; Refill: 1    8. Morbid obesity due to excess calories (CMS/[facility])  -     Dulaglutide (Trulicity) 1.5 MG/0.5ML solution pen-injector; Inject 1.5 mg under the skin into the appropriate area as directed 1 (One) Time Per Week.  Dispense: 5 pen; Refill: 5    9. Hypogonadism in male  -     CBC & Differential  -     Testosterone    10. Benign prostatic hyperplasia without lower urinary tract symptoms  -     PSA DIAGNOSTIC    Other orders  -     triamcinolone (KENALOG) 0.1 % cream; Apply  topically to the appropriate area as directed 2 (Two) Times a Day.  Dispense: 30 g; Refill: 2  -     cyclobenzaprine (FLEXERIL) 10 MG tablet; Take 1 tablet by mouth 3 (Three) Times a Day As Needed for Muscle Spasms.  Dispense: 90 tablet; Refill: 5  -     empagliflozin (Jardiance) 25 MG tablet tablet; Take 1 tablet by mouth Daily.  Dispense: 90 tablet; Refill: 3  -     lisinopril (PRINIVIL,ZESTRIL) 10 MG tablet; Take 1 tablet by mouth Daily.  Dispense: 90 tablet; Refill: 3    -dm2 - continue medications, recheck labs  -HLD - continue statin,  recheck lipids.  -modd doing well, refill emds  -due check testosterone and psa  -dpn - continue gabapentin as helping  -allergies - H1 blocker and nasal spray  -leg swelling -continue lymphedema pump and compression hose as doing.    No follow-ups on file.

## 2021-09-26 NOTE — PATIENT INSTRUCTIONS

## 2021-10-02 DIAGNOSIS — E78.1 HYPERTRIGLYCERIDEMIA: ICD-10-CM

## 2021-10-02 DIAGNOSIS — E11.42 TYPE 2 DIABETES MELLITUS WITH DIABETIC POLYNEUROPATHY, WITHOUT LONG-TERM CURRENT USE OF INSULIN (HCC): ICD-10-CM

## 2021-10-04 RX ORDER — ATORVASTATIN CALCIUM 20 MG/1
TABLET, FILM COATED ORAL
Qty: 90 TABLET | Refills: 3 | Status: SHIPPED | OUTPATIENT
Start: 2021-10-04 | End: 2022-10-26

## 2021-10-30 DIAGNOSIS — E29.1 HYPOGONADISM IN MALE: ICD-10-CM

## 2021-10-30 DIAGNOSIS — L84 PRE-ULCERATIVE CORN OR CALLOUS: ICD-10-CM

## 2021-11-01 RX ORDER — TESTOSTERONE 20.25 MG/1.25G
GEL TOPICAL
Qty: 150 G | Refills: 4 | Status: SHIPPED | OUTPATIENT
Start: 2021-11-01 | End: 2023-03-31 | Stop reason: SDUPTHER

## 2021-11-01 RX ORDER — UREA 40 %
CREAM (GRAM) TOPICAL
Qty: 28.35 EACH | Refills: 5 | Status: SHIPPED | OUTPATIENT
Start: 2021-11-01 | End: 2022-12-06

## 2021-11-12 LAB
ALBUMIN SERPL-MCNC: 4.3 G/DL (ref 4–5)
ALBUMIN/GLOB SERPL: 1.9 {RATIO} (ref 1.2–2.2)
ALP SERPL-CCNC: 94 IU/L (ref 44–121)
ALT SERPL-CCNC: 34 IU/L (ref 0–44)
AST SERPL-CCNC: 36 IU/L (ref 0–40)
BASOPHILS # BLD AUTO: 0 X10E3/UL (ref 0–0.2)
BASOPHILS NFR BLD AUTO: 0 %
BILIRUB SERPL-MCNC: 0.6 MG/DL (ref 0–1.2)
BUN SERPL-MCNC: 15 MG/DL (ref 6–24)
BUN/CREAT SERPL: 15 (ref 9–20)
CALCIUM SERPL-MCNC: 9.4 MG/DL (ref 8.7–10.2)
CHLORIDE SERPL-SCNC: 97 MMOL/L (ref 96–106)
CHOLEST SERPL-MCNC: 107 MG/DL (ref 100–199)
CO2 SERPL-SCNC: 25 MMOL/L (ref 20–29)
CREAT SERPL-MCNC: 0.97 MG/DL (ref 0.76–1.27)
EOSINOPHIL # BLD AUTO: 0.2 X10E3/UL (ref 0–0.4)
EOSINOPHIL NFR BLD AUTO: 3 %
ERYTHROCYTE [DISTWIDTH] IN BLOOD BY AUTOMATED COUNT: 13.8 % (ref 11.6–15.4)
GLOBULIN SER CALC-MCNC: 2.3 G/DL (ref 1.5–4.5)
GLUCOSE SERPL-MCNC: 138 MG/DL (ref 65–99)
HBA1C MFR BLD: 7.1 % (ref 4.8–5.6)
HCT VFR BLD AUTO: 45.1 % (ref 37.5–51)
HDLC SERPL-MCNC: 32 MG/DL
HGB BLD-MCNC: 15.3 G/DL (ref 13–17.7)
IMM GRANULOCYTES # BLD AUTO: 0 X10E3/UL (ref 0–0.1)
IMM GRANULOCYTES NFR BLD AUTO: 0 %
LDLC SERPL CALC-MCNC: 41 MG/DL (ref 0–99)
LYMPHOCYTES # BLD AUTO: 1.6 X10E3/UL (ref 0.7–3.1)
LYMPHOCYTES NFR BLD AUTO: 28 %
MCH RBC QN AUTO: 29.7 PG (ref 26.6–33)
MCHC RBC AUTO-ENTMCNC: 33.9 G/DL (ref 31.5–35.7)
MCV RBC AUTO: 88 FL (ref 79–97)
MONOCYTES # BLD AUTO: 0.5 X10E3/UL (ref 0.1–0.9)
MONOCYTES NFR BLD AUTO: 8 %
NEUTROPHILS # BLD AUTO: 3.5 X10E3/UL (ref 1.4–7)
NEUTROPHILS NFR BLD AUTO: 61 %
PLATELET # BLD AUTO: 154 X10E3/UL (ref 150–450)
POTASSIUM SERPL-SCNC: 3.8 MMOL/L (ref 3.5–5.2)
PROT SERPL-MCNC: 6.6 G/DL (ref 6–8.5)
PSA SERPL-MCNC: 0.3 NG/ML (ref 0–4)
RBC # BLD AUTO: 5.15 X10E6/UL (ref 4.14–5.8)
SODIUM SERPL-SCNC: 139 MMOL/L (ref 134–144)
TESTOST SERPL-MCNC: 341 NG/DL (ref 264–916)
TRIGL SERPL-MCNC: 213 MG/DL (ref 0–149)
VLDLC SERPL CALC-MCNC: 34 MG/DL (ref 5–40)
WBC # BLD AUTO: 5.8 X10E3/UL (ref 3.4–10.8)

## 2021-12-26 DIAGNOSIS — M1A.09X0 IDIOPATHIC CHRONIC GOUT OF MULTIPLE SITES WITHOUT TOPHUS: ICD-10-CM

## 2021-12-27 RX ORDER — IBUPROFEN 800 MG/1
TABLET ORAL
Qty: 90 TABLET | Refills: 5 | Status: SHIPPED | OUTPATIENT
Start: 2021-12-27 | End: 2023-03-14 | Stop reason: SDUPTHER

## 2022-01-25 ENCOUNTER — TELEPHONE (OUTPATIENT)
Dept: FAMILY MEDICINE CLINIC | Facility: CLINIC | Age: 52
End: 2022-01-25

## 2022-01-25 NOTE — TELEPHONE ENCOUNTER
Caller: Marcial Desai    Relationship to patient: Self    Best call back number:[phone]    Date of positive COVID19 test: 01/24  COVID19 symptoms: FEVER, COUGH, WEAK., FATIGUE, BODY ACHES     Date of initial quarantine: 01/24    What is the patients preferred pharmacy:   [facility] [address]  [phone] [facility] [phone] FX

## 2022-01-25 NOTE — TELEPHONE ENCOUNTER
Pt is covid + and he is aware to rest, push fluids, tylenol/ibuprofen for body aches/fever/chills and if has any shortness of air go to ER. He is aware no one has the antibody infusion at this time.

## 2022-02-25 DIAGNOSIS — E11.42 TYPE 2 DIABETES MELLITUS WITH DIABETIC POLYNEUROPATHY, WITHOUT LONG-TERM CURRENT USE OF INSULIN: ICD-10-CM

## 2022-03-07 DIAGNOSIS — E11.42 DIABETIC PERIPHERAL NEUROPATHY: ICD-10-CM

## 2022-03-08 RX ORDER — GABAPENTIN 400 MG/1
CAPSULE ORAL
Qty: 270 CAPSULE | Refills: 0 | Status: SHIPPED | OUTPATIENT
Start: 2022-03-08 | End: 2022-05-24

## 2022-03-21 ENCOUNTER — OFFICE VISIT (OUTPATIENT)
Dept: FAMILY MEDICINE CLINIC | Facility: CLINIC | Age: 52
End: 2022-03-21

## 2022-03-21 VITALS
DIASTOLIC BLOOD PRESSURE: 74 MMHG | OXYGEN SATURATION: 99 % | BODY MASS INDEX: 39.17 KG/M2 | SYSTOLIC BLOOD PRESSURE: 132 MMHG | WEIGHT: 315 LBS | HEART RATE: 91 BPM | HEIGHT: 75 IN

## 2022-03-21 DIAGNOSIS — E11.42 DIABETIC PERIPHERAL NEUROPATHY: ICD-10-CM

## 2022-03-21 DIAGNOSIS — J30.1 SEASONAL ALLERGIC RHINITIS DUE TO POLLEN: ICD-10-CM

## 2022-03-21 DIAGNOSIS — E11.42 TYPE 2 DIABETES MELLITUS WITH DIABETIC POLYNEUROPATHY, WITHOUT LONG-TERM CURRENT USE OF INSULIN: Primary | ICD-10-CM

## 2022-03-21 DIAGNOSIS — Z12.12 ENCOUNTER FOR COLORECTAL CANCER SCREENING: ICD-10-CM

## 2022-03-21 DIAGNOSIS — E29.1 HYPOGONADISM IN MALE: ICD-10-CM

## 2022-03-21 DIAGNOSIS — I10 BENIGN ESSENTIAL HYPERTENSION: ICD-10-CM

## 2022-03-21 DIAGNOSIS — Z12.11 ENCOUNTER FOR COLORECTAL CANCER SCREENING: ICD-10-CM

## 2022-03-21 DIAGNOSIS — I89.0 LYMPHEDEMA: ICD-10-CM

## 2022-03-21 DIAGNOSIS — E78.1 HYPERTRIGLYCERIDEMIA: ICD-10-CM

## 2022-03-21 DIAGNOSIS — N40.0 BENIGN PROSTATIC HYPERPLASIA WITHOUT LOWER URINARY TRACT SYMPTOMS: ICD-10-CM

## 2022-03-21 DIAGNOSIS — M1A.09X0 IDIOPATHIC CHRONIC GOUT OF MULTIPLE SITES WITHOUT TOPHUS: ICD-10-CM

## 2022-03-21 PROCEDURE — 99214 OFFICE O/P EST MOD 30 MIN: CPT | Performed by: FAMILY MEDICINE

## 2022-03-21 RX ORDER — CETIRIZINE HYDROCHLORIDE 10 MG/1
10 TABLET ORAL DAILY
Qty: 90 TABLET | Refills: 3 | Status: SHIPPED | OUTPATIENT
Start: 2022-03-21

## 2022-03-21 NOTE — PROGRESS NOTES
Subjective   Marcial Desai is a 52 y.o. male. Presents today for   Chief Complaint   Patient presents with   • Follow-up   • Hypertension   • Diabetes       Hypertension  This is a chronic problem. The current episode started more than 1 year ago. The problem is unchanged. The problem is controlled. Pertinent negatives include no chest pain, peripheral edema, PND or shortness of breath. The current treatment provides moderate improvement.   Diabetes  He presents for his follow-up diabetic visit. He has type 2 diabetes mellitus. His disease course has been stable. Pertinent negatives for diabetes include no chest pain and no foot paresthesias. Symptoms are stable.   Hyperlipidemia  This is a chronic problem. The current episode started more than 1 year ago. The problem is controlled. Recent lipid tests were reviewed and are normal. Exacerbating diseases include diabetes and obesity. Pertinent negatives include no chest pain or shortness of breath. Current antihyperlipidemic treatment includes statins. The current treatment provides moderate improvement of lipids. Risk factors for coronary artery disease include hypertension, diabetes mellitus, male sex and obesity.       Has dpn, would like compound pain crm as wife has and helps her.    Has hypogonadism due for labs  Has hx of gout, on allopurinol due for labs  hsa lymphedema worse when on feet.     Review of Systems   Respiratory: Negative for shortness of breath.    Cardiovascular: Negative for chest pain and PND.       Patient Active Problem List   Diagnosis   • Ankle pain   • Atopic dermatitis   • Benign essential hypertension   • Type 2 diabetes mellitus with diabetic polyneuropathy, without long-term current use of insulin (HCC)   • Diabetic peripheral neuropathy (HCC)   • Abnormal liver function tests   • Idiopathic chronic gout of multiple sites without tophus   • Hypertriglyceridemia   • Cramps of lower extremity   • Edema of lower extremity   • Lymphedema    • Osteoarthritis   • Morbid obesity due to excess calories (HCC)   • Hypogonadism in male       Social History     Socioeconomic History   • Marital status:    Tobacco Use   • Smoking status: Never Smoker   • Smokeless tobacco: Never Used   Substance and Sexual Activity   • Alcohol use: No   • Drug use: No       Allergies   Allergen Reactions   • Cortisone Shortness Of Breath       Current Outpatient Medications on File Prior to Visit   Medication Sig Dispense Refill   • ACCU-CHEK TYREL PLUS test strip TEST DAILY 50 each 2   • allopurinol (ZYLOPRIM) 300 MG tablet TAKE ONE TABLET BY MOUTH TWICE A DAY 60 tablet 9   • ammonium lactate (Lac-Hydrin) 12 % cream Apply  topically to the appropriate area as directed As Needed for Dry Skin (venosu stasis dermatitis). 385 g 5   • atorvastatin (LIPITOR) 20 MG tablet TAKE 1 TABLET BY MOUTH AT BEDTIME  90 tablet 3   • Blood Glucose Monitoring Suppl (ACCU-CHEK TYREL PLUS) w/Device kit      • buPROPion XL (WELLBUTRIN XL) 150 MG 24 hr tablet Take 1 tablet by mouth Daily. 90 tablet 3   • cetirizine (zyrTEC) 10 MG tablet Take 1 tablet by mouth Daily. 90 tablet 3   • cyclobenzaprine (FLEXERIL) 10 MG tablet Take 1 tablet by mouth 3 (Three) Times a Day As Needed for Muscle Spasms. 90 tablet 5   • Dulaglutide (Trulicity) 1.5 MG/0.5ML solution pen-injector Inject 1.5 mg under the skin into the appropriate area as directed 1 (One) Time Per Week. 5 pen 5   • empagliflozin (Jardiance) 25 MG tablet tablet Take 1 tablet by mouth Daily. 90 tablet 3   • fluticasone (FLONASE) 50 MCG/ACT nasal spray 2 sprays by Each Nare route Daily. 48 g 3   • furosemide (LASIX) 40 MG tablet Take 1 tablet by mouth Daily. 90 tablet 3   • gabapentin (NEURONTIN) 400 MG capsule TAKE ONE CAPSULE BY MOUTH THREE TIMES A  capsule 0   • glucose blood test strip Check once daily E11.9 non-iddm;  States [employer] covered;  Also disp alcohol swabs, lancets. 50 each 12   • glucose monitor monitoring kit  Check once daily d x E11.9 non-iddm 1 each 0   • hydrocortisone 2.5 % cream Apply  topically 2 (two) times a day. Wipe bottom twice daily 28 g 0   • ibuprofen (ADVIL,MOTRIN) 800 MG tablet TAKE ONE TABLET BY MOUTH EVERY 8 HOURS AS NEEDED FOR MILD TO MODERATE PAIN 90 tablet 5   • Lancets misc 1 Device Daily. Dispense brand covered by insurance. Dx. e11.9 100 each 12   • lisinopril (PRINIVIL,ZESTRIL) 10 MG tablet Take 1 tablet by mouth Daily. 90 tablet 3   • magnesium oxide (MAGOX) 400 (241.3 Mg) MG tablet tablet TAKE ONE TABLET BY MOUTH DAILY 30 tablet 9   • metFORMIN (GLUCOPHAGE) 1000 MG tablet TAKE 1 AND 1/2 TABLETS BY MOUTH IN THE MORNING and 1 tablet in the evening 235 tablet 0   • mupirocin (BACTROBAN) 2 % ointment Apply  topically to the appropriate area as directed 2 (Two) Times a Day. to affected area(s) 30 each 5   • potassium chloride (K-DUR,KLOR-CON) 10 MEQ CR tablet TAKE THREE TABLETS BY MOUTH TWICE A DAY 54 tablet 0   • sildenafil (VIAGRA) 100 MG tablet 1/2 to 1 po daily prn ED 10 tablet 5   • Testosterone 1.62 % gel APPLY 4 PUMP ACTUATIONS (40.5 MG/2.5 GM) TOPICALLY ONCE DAILY (2 PUMPS ON EACH SHOULDER OR INNER THIGH) 150 g 4   • Testosterone 20.25 MG/ACT (1.62%) gel APPLY 1 PUMP DAILY 75 g 2   • tiZANidine (ZANAFLEX) 4 MG tablet Take 1 tablet by mouth Every 8 (Eight) Hours As Needed for Muscle Spasms. 90 tablet 5   • triamcinolone (KENALOG) 0.1 % cream Apply  topically to the appropriate area as directed 2 (Two) Times a Day. 30 g 2   • urea (CARMOL) 40 % cream APPLY TO AFFECTED AREA(S) ON SECOND TOE DAILY AS DIRECTED 28.35 each 5     Current Facility-Administered Medications on File Prior to Visit   Medication Dose Route Frequency Provider Last Rate Last Admin   • Testosterone Cypionate (DEPOTESTOTERONE CYPIONATE) injection 100 mg  100 mg Intramuscular Q14 Days Tesfaye Sawrtz DO   100 mg at 06/25/19 1220       Objective   Vitals:    03/21/22 0803   BP: 132/74   Pulse: 91   SpO2: 99%   Weight: (!)  "161 kg (356 lb)   Height: 190.5 cm (75\")   PainSc:   4   PainLoc: Leg  Comment: right     Body mass index is 44.5 kg/m².    Physical Exam  Vitals and nursing note reviewed.   Constitutional:       Appearance: He is well-developed.   HENT:      Head: Normocephalic and atraumatic.   Neck:      Thyroid: No thyromegaly.      Vascular: No JVD.   Cardiovascular:      Rate and Rhythm: Normal rate and regular rhythm.      Heart sounds: Normal heart sounds. No murmur heard.    No friction rub. No gallop.   Pulmonary:      Effort: Pulmonary effort is normal. No respiratory distress.      Breath sounds: Normal breath sounds. No wheezing or rales.   Abdominal:      General: Bowel sounds are normal. There is no distension.      Palpations: Abdomen is soft.      Tenderness: There is no abdominal tenderness. There is no guarding or rebound.   Musculoskeletal:      Cervical back: Neck supple.   Lymphadenopathy:      Comments: B/l severe lymphedema   Skin:     General: Skin is warm and dry.   Neurological:      Mental Status: He is alert.   Psychiatric:         Behavior: Behavior normal.         Assessment/Plan   Diagnoses and all orders for this visit:    1. Type 2 diabetes mellitus with diabetic polyneuropathy, without long-term current use of insulin (HCC) (Primary)  -     Comprehensive Metabolic Panel  -     Lipid Panel  -     Hemoglobin A1c  -     Microalbumin / Creatinine Urine Ratio - Urine, Clean Catch    2. Benign essential hypertension    3. Hypertriglyceridemia  -     Lipid Panel    4. Diabetic peripheral neuropathy (HCC)  -     Uric Acid    5. Idiopathic chronic gout of multiple sites without tophus    6. Lymphedema    7. Hypogonadism in male  -     Testosterone, Free, Total  -     PSA DIAGNOSTIC  -     CBC & Differential    8. Benign prostatic hyperplasia without lower urinary tract symptoms  -     PSA DIAGNOSTIC    9. Encounter for colorectal cancer screening  -     Cologuard - Stool, Per Rectum    -dm2 - continue " medications, recheck labs  -hypertension - controlled, continue medications  -HLD - continue statin, recheck lipids.  -neuropathy - faxed order for compound pain crm dm toes  -due check testosteron and psa  -counseled colorectal cancer screening, will try cologuard  -due check uric acid.          -Follow up: 6 onths and prn

## 2022-03-21 NOTE — PATIENT INSTRUCTIONS
"\"2-8-3-Almost None!\"  Healthy Habits Start Early    EAT 5 OR MORE SERVINGS OF VEGETABLES AND FRUITS EVERY DAY.    Help Marcial get three vegetables and two fruits each day. Red, green, yellow, orange...encourage them to try all the colors so they can enjoy different flavors and get more vitamins.    How can I help Marcial do this?  ---------------------------------------------  -BE PATIENT WITH Marcial, remember it may take 10 times before they start to like new food. So, start with small bites and just keep trying.  -Serve at least one vegetable or fruit at every meal. Even try two. Remember, portions do not have to be as big as you think.  -Encourage eating fruits and vegetables instead of drinking them..it's a better way to get fiber and vitamins..so limit the amount of juice to 1/2 cup per day for children 1-6 years and one cup per day for children 7-18 years of age. Try using 1/2 part water and 1/2 part juice.    Spend less than two hours per day watching television and other screen media. Screen media includes video games, movies and computer use for entertainment.    How can I help Marcial do this?  -Turn off the TV at dinner. Dinner is the best time to hang out with your kids and just talk, learn about their day, and tell them about your day. Your kids have a lot to learn from you and dinner is a great time to share.  "

## 2022-03-24 LAB
ALBUMIN SERPL-MCNC: 4.4 G/DL (ref 3.8–4.9)
ALBUMIN/CREAT UR: <5 MG/G CREAT (ref 0–29)
ALBUMIN/GLOB SERPL: 1.6 {RATIO} (ref 1.2–2.2)
ALP SERPL-CCNC: 95 IU/L (ref 44–121)
ALT SERPL-CCNC: 47 IU/L (ref 0–44)
AST SERPL-CCNC: 43 IU/L (ref 0–40)
BASOPHILS # BLD AUTO: 0 X10E3/UL (ref 0–0.2)
BASOPHILS NFR BLD AUTO: 0 %
BILIRUB SERPL-MCNC: 0.8 MG/DL (ref 0–1.2)
BUN SERPL-MCNC: 14 MG/DL (ref 6–24)
BUN/CREAT SERPL: 15 (ref 9–20)
CALCIUM SERPL-MCNC: 9.4 MG/DL (ref 8.7–10.2)
CHLORIDE SERPL-SCNC: 100 MMOL/L (ref 96–106)
CHOLEST SERPL-MCNC: 100 MG/DL (ref 100–199)
CO2 SERPL-SCNC: 22 MMOL/L (ref 20–29)
CREAT SERPL-MCNC: 0.92 MG/DL (ref 0.76–1.27)
CREAT UR-MCNC: 66.2 MG/DL
EGFRCR SERPLBLD CKD-EPI 2021: 101 ML/MIN/1.73
EOSINOPHIL # BLD AUTO: 0.1 X10E3/UL (ref 0–0.4)
EOSINOPHIL NFR BLD AUTO: 2 %
ERYTHROCYTE [DISTWIDTH] IN BLOOD BY AUTOMATED COUNT: 13.9 % (ref 11.6–15.4)
GLOBULIN SER CALC-MCNC: 2.7 G/DL (ref 1.5–4.5)
GLUCOSE SERPL-MCNC: 158 MG/DL (ref 65–99)
HBA1C MFR BLD: 7.1 % (ref 4.8–5.6)
HCT VFR BLD AUTO: 45.3 % (ref 37.5–51)
HDLC SERPL-MCNC: 30 MG/DL
HGB BLD-MCNC: 14.8 G/DL (ref 13–17.7)
IMM GRANULOCYTES # BLD AUTO: 0 X10E3/UL (ref 0–0.1)
IMM GRANULOCYTES NFR BLD AUTO: 0 %
LDLC SERPL CALC-MCNC: 41 MG/DL (ref 0–99)
LYMPHOCYTES # BLD AUTO: 1.5 X10E3/UL (ref 0.7–3.1)
LYMPHOCYTES NFR BLD AUTO: 25 %
MCH RBC QN AUTO: 29 PG (ref 26.6–33)
MCHC RBC AUTO-ENTMCNC: 32.7 G/DL (ref 31.5–35.7)
MCV RBC AUTO: 89 FL (ref 79–97)
MICROALBUMIN UR-MCNC: <3 UG/ML
MONOCYTES # BLD AUTO: 0.4 X10E3/UL (ref 0.1–0.9)
MONOCYTES NFR BLD AUTO: 7 %
NEUTROPHILS # BLD AUTO: 3.9 X10E3/UL (ref 1.4–7)
NEUTROPHILS NFR BLD AUTO: 66 %
PLATELET # BLD AUTO: 158 X10E3/UL (ref 150–450)
POTASSIUM SERPL-SCNC: 3.9 MMOL/L (ref 3.5–5.2)
PROT SERPL-MCNC: 7.1 G/DL (ref 6–8.5)
PSA SERPL-MCNC: 0.3 NG/ML (ref 0–4)
RBC # BLD AUTO: 5.11 X10E6/UL (ref 4.14–5.8)
SODIUM SERPL-SCNC: 139 MMOL/L (ref 134–144)
TESTOST FREE SERPL-MCNC: 4 PG/ML (ref 7.2–24)
TESTOST SERPL-MCNC: 251 NG/DL (ref 264–916)
TRIGL SERPL-MCNC: 174 MG/DL (ref 0–149)
URATE SERPL-MCNC: 7.5 MG/DL (ref 3.8–8.4)
VLDLC SERPL CALC-MCNC: 29 MG/DL (ref 5–40)
WBC # BLD AUTO: 6 X10E3/UL (ref 3.4–10.8)

## 2022-03-28 ENCOUNTER — TELEPHONE (OUTPATIENT)
Dept: FAMILY MEDICINE CLINIC | Facility: CLINIC | Age: 52
End: 2022-03-28

## 2022-03-28 RX ORDER — ALLOPURINOL 300 MG/1
300 TABLET ORAL DAILY
Qty: 90 TABLET | Refills: 1 | Status: SHIPPED | OUTPATIENT
Start: 2022-03-28

## 2022-03-28 NOTE — TELEPHONE ENCOUNTER
----- Message from Shanita Mueller MA sent at 3/28/2022 12:19 PM EDT -----  Per pt, he has not been taking the allopurinol and has not been taking the testosterone regularly. He said he start taking the testosterone regularly again. Please advise on the allopurinol. He is aware of his results

## 2022-03-28 NOTE — PROGRESS NOTES
Diabetes is near goal, work on diet  Cholesterol is adequately controlled.  Testosterone is low, still on topical  Liver enzymes borderline elevated, will monitor but not concerning range   Uric acid up some, still taking allopurinol?  Rest of labs normal or stable.

## 2022-03-28 NOTE — TELEPHONE ENCOUNTER
I would start allopurinol but just 1 daily since not on.  I sent in more.  RRJ    ----- Message from Shanita Mueller MA sent at 3/28/2022 12:19 PM EDT -----  Per pt, he has not been taking the allopurinol and has not been taking the testosterone regularly. He said he start taking the testosterone regularly again. Please advise on the allopurinol. He is aware of his results

## 2022-05-23 DIAGNOSIS — E11.42 DIABETIC PERIPHERAL NEUROPATHY: ICD-10-CM

## 2022-05-24 RX ORDER — GABAPENTIN 400 MG/1
CAPSULE ORAL
Qty: 270 CAPSULE | Refills: 0 | Status: SHIPPED | OUTPATIENT
Start: 2022-05-24

## 2022-08-09 ENCOUNTER — TELEPHONE (OUTPATIENT)
Dept: FAMILY MEDICINE CLINIC | Facility: CLINIC | Age: 52
End: 2022-08-09

## 2022-08-09 NOTE — TELEPHONE ENCOUNTER
[facility]: [facility] - [place], [place] - [address] - [phone] [facility] [phone]      Pharmacy representative name: SALBADOR    Pharmacy representative phone number:[phone]     What medication are you calling in regards to: TRULICITY    What question does the pharmacy have: THEY WOULD LIKE TO ADVISE THE DOCTOR OF SOME INFORMATION AND WITHDRAW THE PRIOR AUTH    Who is the provider that prescribed the medication: DR MARROQUIN    Additional notes: SALBADOR REQUESTING A CALL BACK TO DISCUSS THE WITHDRAW OF THE PRIOR AUTH

## 2022-08-09 NOTE — TELEPHONE ENCOUNTER
Caller: MARYANN    Relationship: Other    Best call back number: [phone]    What was the call regarding: CALLING TO REQUEST A WITHDRAW OF THE PRIOR AUTH REGARDING TRULICITY.     Do you require a callback: YES

## 2022-09-02 DIAGNOSIS — F32.2 SEVERE SINGLE CURRENT EPISODE OF MAJOR DEPRESSIVE DISORDER, WITHOUT PSYCHOTIC FEATURES: ICD-10-CM

## 2022-09-02 DIAGNOSIS — R60.0 EDEMA OF LOWER EXTREMITY: ICD-10-CM

## 2022-09-04 RX ORDER — BUPROPION HYDROCHLORIDE 150 MG/1
TABLET ORAL
Qty: 90 TABLET | Refills: 3 | Status: SHIPPED | OUTPATIENT
Start: 2022-09-04

## 2022-09-04 RX ORDER — FUROSEMIDE 40 MG/1
TABLET ORAL
Qty: 90 TABLET | Refills: 3 | Status: SHIPPED | OUTPATIENT
Start: 2022-09-04

## 2022-09-23 ENCOUNTER — OFFICE VISIT (OUTPATIENT)
Dept: FAMILY MEDICINE CLINIC | Facility: CLINIC | Age: 52
End: 2022-09-23

## 2022-09-23 VITALS
BODY MASS INDEX: 39.17 KG/M2 | HEIGHT: 75 IN | SYSTOLIC BLOOD PRESSURE: 138 MMHG | WEIGHT: 315 LBS | OXYGEN SATURATION: 97 % | HEART RATE: 80 BPM | DIASTOLIC BLOOD PRESSURE: 78 MMHG

## 2022-09-23 DIAGNOSIS — E29.1 HYPOGONADISM IN MALE: ICD-10-CM

## 2022-09-23 DIAGNOSIS — E66.01 MORBID (SEVERE) OBESITY DUE TO EXCESS CALORIES: ICD-10-CM

## 2022-09-23 DIAGNOSIS — I89.0 LYMPHEDEMA: ICD-10-CM

## 2022-09-23 DIAGNOSIS — E78.1 HYPERTRIGLYCERIDEMIA: ICD-10-CM

## 2022-09-23 DIAGNOSIS — E11.42 TYPE 2 DIABETES MELLITUS WITH DIABETIC POLYNEUROPATHY, WITHOUT LONG-TERM CURRENT USE OF INSULIN: ICD-10-CM

## 2022-09-23 DIAGNOSIS — Z23 FLU VACCINE NEED: ICD-10-CM

## 2022-09-23 DIAGNOSIS — E11.42 DIABETIC PERIPHERAL NEUROPATHY: ICD-10-CM

## 2022-09-23 DIAGNOSIS — I10 BENIGN ESSENTIAL HYPERTENSION: ICD-10-CM

## 2022-09-23 DIAGNOSIS — Z00.00 MEDICARE ANNUAL WELLNESS VISIT, SUBSEQUENT: Primary | ICD-10-CM

## 2022-09-23 DIAGNOSIS — Z23 IMMUNIZATION DUE: ICD-10-CM

## 2022-09-23 DIAGNOSIS — E66.01 MORBID OBESITY DUE TO EXCESS CALORIES: ICD-10-CM

## 2022-09-23 DIAGNOSIS — E55.9 VITAMIN D DEFICIENCY: ICD-10-CM

## 2022-09-23 DIAGNOSIS — M10.079 ACUTE IDIOPATHIC GOUT INVOLVING TOE, UNSPECIFIED LATERALITY: ICD-10-CM

## 2022-09-23 DIAGNOSIS — N40.0 BENIGN PROSTATIC HYPERPLASIA WITHOUT LOWER URINARY TRACT SYMPTOMS: ICD-10-CM

## 2022-09-23 DIAGNOSIS — G47.09 OTHER INSOMNIA: ICD-10-CM

## 2022-09-23 PROCEDURE — 1159F MED LIST DOCD IN RCRD: CPT | Performed by: FAMILY MEDICINE

## 2022-09-23 PROCEDURE — 1126F AMNT PAIN NOTED NONE PRSNT: CPT | Performed by: FAMILY MEDICINE

## 2022-09-23 PROCEDURE — 1170F FXNL STATUS ASSESSED: CPT | Performed by: FAMILY MEDICINE

## 2022-09-23 PROCEDURE — 99214 OFFICE O/P EST MOD 30 MIN: CPT | Performed by: FAMILY MEDICINE

## 2022-09-23 PROCEDURE — G0439 PPPS, SUBSEQ VISIT: HCPCS | Performed by: FAMILY MEDICINE

## 2022-09-23 PROCEDURE — G0008 ADMIN INFLUENZA VIRUS VAC: HCPCS | Performed by: FAMILY MEDICINE

## 2022-09-23 PROCEDURE — 90686 IIV4 VACC NO PRSV 0.5 ML IM: CPT | Performed by: FAMILY MEDICINE

## 2022-09-23 RX ORDER — TRAZODONE HYDROCHLORIDE 50 MG/1
50 TABLET ORAL NIGHTLY
Qty: 30 TABLET | Refills: 5 | Status: SHIPPED | OUTPATIENT
Start: 2022-09-23 | End: 2023-03-31 | Stop reason: SINTOL

## 2022-09-23 RX ORDER — INDOMETHACIN 25 MG/1
CAPSULE ORAL
Qty: 30 CAPSULE | Refills: 5 | Status: SHIPPED | OUTPATIENT
Start: 2022-09-23

## 2022-09-23 NOTE — PATIENT INSTRUCTIONS
"\"2-1-4-Almost None!\"  Healthy Habits Start Early    EAT 5 OR MORE SERVINGS OF VEGETABLES AND FRUITS EVERY DAY.    Help Marcial get three vegetables and two fruits each day. Red, green, yellow, orange...encourage them to try all the colors so they can enjoy different flavors and get more vitamins.    How can I help Marcial do this?  ---------------------------------------------  -BE PATIENT WITH Marcial, remember it may take 10 times before they start to like new food. So, start with small bites and just keep trying.  -Serve at least one vegetable or fruit at every meal. Even try two. Remember, portions do not have to be as big as you think.  -Encourage eating fruits and vegetables instead of drinking them..it's a better way to get fiber and vitamins..so limit the amount of juice to 1/2 cup per day for children 1-6 years and one cup per day for children 7-18 years of age. Try using 1/2 part water and 1/2 part juice.    Spend less than two hours per day watching television and other screen media. Screen media includes video games, movies and computer use for entertainment.    How can I help Marcial do this?  -Turn off the TV at dinner. Dinner is the best time to hang out with your kids and just talk, learn about their day, and tell them about your day. Your kids have a lot to learn from you and dinner is a great time to share.  Advance Care Planning and Advance Directives     You make decisions on a daily basis - decisions about where you want to live, your career, your home, your life. Perhaps one of the most important decisions you face is your choice for future medical care. Take time to talk with your family and your healthcare team and start planning today.  Advance Care Planning is a process that can help you:  Understand possible future healthcare decisions in light of your own experiences  Reflect on those decision in light of your goals and values  Discuss your decisions with those closest to you and the healthcare " professionals that care for you  Make a plan by creating a document that reflects your wishes    Surrogate Decision Maker  In the event of a medical emergency, which has left you unable to communicate or to make your own decisions, you would need someone to make decisions for you.  It is important to discuss your preferences for medical treatment with this person while you are in good health.     Qualities of a surrogate decision maker:  Willing to take on this role and responsibility  Knows what you want for future medical care  Willing to follow your wishes even if they don't agree with them  Able to make difficult medical decisions under stressful circumstances    Advance Directives  These are legal documents you can create that will guide your healthcare team and decision maker(s) when needed. These documents can be stored in the electronic medical record.    Living Will - a legal document to guide your care if you have a terminal condition or a serious illness and are unable to communicate. States vary by statute in document names/types, but most forms may include one or more of the following:        -  Directions regarding life-prolonging treatments        -  Directions regarding artificially provided nutrition/hydration        -  Choosing a healthcare decision maker        -  Direction regarding organ/tissue donation    Durable Power of  for Healthcare - this document names an -in-fact to make medical decisions for you, but it may also allow this person to make personal and financial decisions for you. Please seek the advice of an  if you need this type of document.    **Advance Directives are not required and no one may discriminate against you if you do not sign one.    Medical Orders  Many states allow specific forms/orders signed by your physician to record your wishes for medical treatment in your current state of health. This form, signed in personal communication with your  physician, addresses resuscitation and other medical interventions that you may or may not want.      For more information or to schedule a time with a [facility] Advance Care Planning Facilitator contact: [facility]/ACP or call [phone] and someone will contact you directly.

## 2022-09-23 NOTE — PROGRESS NOTES
QUICK REFERENCE INFORMATION:  The ABCs of the Annual Wellness Visit    Subsequent Medicare Wellness Visit    HEALTH RISK ASSESSMENT    1970    Recent Hospitalizations:  No hospitalization(s) within the last year..        Current Medical Providers:  Patient Care Team:  Tesfaye Swartz DO as PCP - General        Smoking Status:  Social History     Tobacco Use   Smoking Status Never Smoker   Smokeless Tobacco Never Used       Alcohol Consumption:  Social History     Substance and Sexual Activity   Alcohol Use No       Depression Screen:   PHQ-2/PHQ-9 Depression Screening 9/23/2022   Retired PHQ-9 Total Score -   Retired Total Score -   Little Interest or Pleasure in Doing Things 0-->not at all   Feeling Down, Depressed or Hopeless 0-->not at all   PHQ-9: Brief Depression Severity Measure Score 0       Health Habits and Functional and Cognitive Screening:  Functional & Cognitive Status 9/23/2022   Do you have difficulty preparing food and eating? No   Do you have difficulty bathing yourself, getting dressed or grooming yourself? No   Do you have difficulty using the toilet? No   Do you have difficulty moving around from place to place? No   Do you have trouble with steps or getting out of a bed or a chair? No   Current Diet Well Balanced Diet   Dental Exam Up to date   Eye Exam Up to date   Exercise (times per week) 3 times per week   Current Exercises Include House Cleaning   Current Exercise Activities Include -   Do you need help using the phone?  No   Are you deaf or do you have serious difficulty hearing?  No   Do you need help with transportation? No   Do you need help shopping? No   Do you need help preparing meals?  No   Do you need help with housework?  No   Do you need help with laundry? No   Do you need help taking your medications? No   Do you need help managing money? No   Do you ever drive or ride in a car without wearing a seat belt? No   Have you felt unusual stress, anger or loneliness in the  last month? No   Who do you live with? Spouse   If you need help, do you have trouble finding someone available to you? No   Have you been bothered in the last four weeks by sexual problems? No   Do you have difficulty concentrating, remembering or making decisions? No           Does the patient have evidence of cognitive impairment? No    Aspirin use counseling: Taking ASA appropriately as indicated      Recent Lab Results:  CMP:  Lab Results   Component Value Date    BUN 14 03/21/2022    CREATININE 0.92 03/21/2022    EGFRIFNONA 91 11/11/2021    EGFRIFAFRI 105 11/11/2021    BCR 15 03/21/2022     03/21/2022    K 3.9 03/21/2022    CO2 22 03/21/2022    CALCIUM 9.4 03/21/2022    PROTENTOTREF 7.1 03/21/2022    ALBUMIN 4.4 03/21/2022    LABGLOBREF 2.7 03/21/2022    LABIL2 1.6 03/21/2022    BILITOT 0.8 03/21/2022    ALKPHOS 95 03/21/2022    AST 43 (H) 03/21/2022    ALT 47 (H) 03/21/2022     Lipid Panel:  Lab Results   Component Value Date    TRIG 174 (H) 03/21/2022    HDL 30 (L) 03/21/2022    VLDL 29 03/21/2022     HbA1c:  Lab Results   Component Value Date    HGBA1C 7.1 (H) 03/21/2022       Visual Acuity:  No exam data present    Age-appropriate Screening Schedule:  Refer to the list below for future screening recommendations based on patient's age, sex and/or medical conditions. Orders for these recommended tests are listed in the plan section. The patient has been provided with a written plan.    Health Maintenance   Topic Date Due   • ZOSTER VACCINE (1 of 2) Never done   • HEMOGLOBIN A1C  09/21/2022   • INFLUENZA VACCINE  10/01/2022   • DIABETIC EYE EXAM  02/16/2023   • LIPID PANEL  03/21/2023   • URINE MICROALBUMIN  03/21/2023   • DIABETIC FOOT EXAM  09/23/2023   • TDAP/TD VACCINES (2 - Td or Tdap) 01/19/2026        Subjective   History of Present Illness    Marcial Desai is a 52 y.o. male who presents for an Subsequent Wellness Visit.    The following portions of the patient's history were reviewed and updated  as appropriate: allergies, current medications, past family history, past medical history, past social history, past surgical history and problem list.    Outpatient Medications Prior to Visit   Medication Sig Dispense Refill   • ACCU-CHEK TYREL PLUS test strip TEST DAILY 50 each 2   • allopurinol (ZYLOPRIM) 300 MG tablet Take 1 tablet by mouth Daily. 90 tablet 1   • ammonium lactate (Lac-Hydrin) 12 % cream Apply  topically to the appropriate area as directed As Needed for Dry Skin (venosu stasis dermatitis). 385 g 5   • atorvastatin (LIPITOR) 20 MG tablet TAKE 1 TABLET BY MOUTH AT BEDTIME  90 tablet 3   • Blood Glucose Monitoring Suppl (ACCU-CHEK TYREL PLUS) w/Device kit      • buPROPion XL (WELLBUTRIN XL) 150 MG 24 hr tablet TAKE ONE TABLET BY MOUTH DAILY 90 tablet 3   • cetirizine (zyrTEC) 10 MG tablet Take 1 tablet by mouth Daily. 90 tablet 3   • cyclobenzaprine (FLEXERIL) 10 MG tablet Take 1 tablet by mouth 3 (Three) Times a Day As Needed for Muscle Spasms. 90 tablet 5   • empagliflozin (Jardiance) 25 MG tablet tablet Take 1 tablet by mouth Daily. 90 tablet 3   • fluticasone (FLONASE) 50 MCG/ACT nasal spray 2 sprays by Each Nare route Daily. 48 g 3   • furosemide (LASIX) 40 MG tablet TAKE ONE TABLET BY MOUTH DAILY 90 tablet 3   • gabapentin (NEURONTIN) 400 MG capsule TAKE ONE CAPSULE BY MOUTH THREE TIMES A  capsule 0   • glucose blood test strip Check once daily E11.9 non-iddm;  States [employer] brand covered;  Also disp alcohol swabs, lancets. 50 each 12   • glucose monitor monitoring kit Check once daily d x E11.9 non-iddm 1 each 0   • hydrocortisone 2.5 % cream Apply  topically 2 (two) times a day. Wipe bottom twice daily 28 g 0   • ibuprofen (ADVIL,MOTRIN) 800 MG tablet TAKE ONE TABLET BY MOUTH EVERY 8 HOURS AS NEEDED FOR MILD TO MODERATE PAIN 90 tablet 5   • Lancets misc 1 Device Daily. Dispense brand covered by insurance. Dx. e11.9 100 each 12   • lisinopril (PRINIVIL,ZESTRIL) 10 MG tablet Take 1  tablet by mouth Daily. 90 tablet 3   • magnesium oxide (MAGOX) 400 (241.3 Mg) MG tablet tablet TAKE ONE TABLET BY MOUTH DAILY 30 tablet 9   • metFORMIN (GLUCOPHAGE) 1000 MG tablet TAKE 1 AND 1/2 TABLETS BY MOUTH IN THE MORNING and 1 tablet in the evening 235 tablet 0   • mupirocin (BACTROBAN) 2 % ointment Apply  topically to the appropriate area as directed 2 (Two) Times a Day. to affected area(s) 30 each 5   • potassium chloride (K-DUR,KLOR-CON) 10 MEQ CR tablet TAKE THREE TABLETS BY MOUTH TWICE A DAY 54 tablet 0   • sildenafil (VIAGRA) 100 MG tablet 1/2 to 1 po daily prn ED 10 tablet 5   • Testosterone 1.62 % gel APPLY 4 PUMP ACTUATIONS (40.5 MG/2.5 GM) TOPICALLY ONCE DAILY (2 PUMPS ON EACH SHOULDER OR INNER THIGH) 150 g 4   • Testosterone 20.25 MG/ACT (1.62%) gel APPLY 1 PUMP DAILY 75 g 2   • tiZANidine (ZANAFLEX) 4 MG tablet Take 1 tablet by mouth Every 8 (Eight) Hours As Needed for Muscle Spasms. 90 tablet 5   • triamcinolone (KENALOG) 0.1 % cream Apply  topically to the appropriate area as directed 2 (Two) Times a Day. 30 g 2   • urea (CARMOL) 40 % cream APPLY TO AFFECTED AREA(S) ON SECOND TOE DAILY AS DIRECTED 28.35 each 5   • Dulaglutide (Trulicity) 1.5 MG/0.5ML solution pen-injector Inject 1.5 mg under the skin into the appropriate area as directed 1 (One) Time Per Week. 5 pen 5     Facility-Administered Medications Prior to Visit   Medication Dose Route Frequency Provider Last Rate Last Admin   • Testosterone Cypionate (DEPOTESTOTERONE CYPIONATE) injection 100 mg  100 mg Intramuscular Q14 Days Tesfaye Swartz DO   100 mg at 06/25/19 1220       Patient Active Problem List   Diagnosis   • Ankle pain   • Atopic dermatitis   • Benign essential hypertension   • Type 2 diabetes mellitus with diabetic polyneuropathy, without long-term current use of insulin (HCC)   • Diabetic peripheral neuropathy (HCC)   • Abnormal liver function tests   • Idiopathic chronic gout of multiple sites without tophus   •  "Hypertriglyceridemia   • Cramps of lower extremity   • Edema of lower extremity   • Lymphedema   • Osteoarthritis   • Morbid obesity due to excess calories (HCC)   • Hypogonadism in male       Advance Care Planning:  ACP discussion was held with the patient during this visit. Patient does not have an advance directive, information provided.    Identification of Risk Factors:  Risk factors include: Obesity/Overweight .    Review of Systems   Respiratory: Negative for shortness of breath.    Cardiovascular: Positive for leg swelling. Negative for chest pain and palpitations.   Gastrointestinal: Negative for abdominal pain.   Musculoskeletal: Positive for arthralgias and joint swelling.       Compared to one year ago, the patient feels his physical health is the same.  Compared to one year ago, the patient feels his mental health is the same.    Objective     Physical Exam  Vitals and nursing note reviewed.   Constitutional:       Appearance: He is well-developed.   Musculoskeletal:      Cervical back: Neck supple.   Feet:      Comments: Diabetic foot exam and monofilament completed, see scanned report.    Acute gout  Skin:     General: Skin is warm and dry.   Neurological:      Mental Status: He is alert.   Psychiatric:         Behavior: Behavior normal.         Vitals:    09/23/22 0744   BP: 138/78   Pulse: 80   SpO2: 97%   Weight: (!) 161 kg (354 lb)   Height: 190.5 cm (75\")   PainSc: 0-No pain       Class 3 Severe Obesity (BMI >=40). Obesity-related health conditions include the following: hypertension, diabetes mellitus and dyslipidemias. Obesity is improving with lifestyle modifications. BMI is is above average; BMI management plan is completed. We discussed portion control and increasing exercise.      Assessment & Plan   Patient Self-Management and Personalized Health Advice  The patient has been provided with information about: diet and exercise and preventive services including:   · Annual Wellness Visit " (AWV).    Visit Diagnoses:    ICD-10-CM ICD-9-CM   1. Medicare annual wellness visit, subsequent  Z00.00 V70.0   2. Acute idiopathic gout involving toe, bilateral toes  M10.079 274.01   3. Other insomnia  G47.09 780.52   4. Type 2 diabetes mellitus with diabetic polyneuropathy, without long-term current use of insulin (HCC)  E11.42 250.60     357.2   5. Diabetic peripheral neuropathy (HCC)  E11.42 250.60     357.2   6. Lymphedema  I89.0 457.1   7. Benign essential hypertension  I10 401.1   8. Hypertriglyceridemia  E78.1 272.1   9. Hypogonadism in male  E29.1 257.2   10. Morbid obesity due to excess calories ([facility])  E66.01 278.01   11. Benign prostatic hyperplasia without lower urinary tract symptoms  N40.0 600.00   12. Vitamin D deficiency  E55.9 268.9   13. Immunization due  Z23 V05.9   14. Flu vaccine need  Z23 V04.81   15. Morbid (severe) obesity due to excess calories ([facility])  E66.01 278.01       Orders Placed This Encounter   Procedures   • FluLaval/Fluarix/Fluzone >6 Months   • Microalbumin / Creatinine Urine Ratio - Urine, Clean Catch     Order Specific Question:   Release to patient     Answer:   Routine Release   • Lipid Panel   • Comprehensive Metabolic Panel     Order Specific Question:   Release to patient     Answer:   Routine Release   • Hemoglobin A1c     Order Specific Question:   Release to patient     Answer:   Routine Release   • Vitamin D 25 Hydroxy     Order Specific Question:   Release to patient     Answer:   Routine Release   • TSH     Order Specific Question:   Release to patient     Answer:   Routine Release   • Uric Acid     Order Specific Question:   Release to patient     Answer:   Routine Release   • Testosterone     Order Specific Question:   Release to patient     Answer:   Routine Release   • PSA DIAGNOSTIC     Order Specific Question:   Release to patient     Answer:   Routine Release   • CBC & Differential     Order Specific Question:   Manual Differential     Answer:   No        Outpatient Encounter Medications as of 9/23/2022   Medication Sig Dispense Refill   • ACCU-CHEK TYREL PLUS test strip TEST DAILY 50 each 2   • allopurinol (ZYLOPRIM) 300 MG tablet Take 1 tablet by mouth Daily. 90 tablet 1   • ammonium lactate (Lac-Hydrin) 12 % cream Apply  topically to the appropriate area as directed As Needed for Dry Skin (venosu stasis dermatitis). 385 g 5   • atorvastatin (LIPITOR) 20 MG tablet TAKE 1 TABLET BY MOUTH AT BEDTIME  90 tablet 3   • Blood Glucose Monitoring Suppl (ACCU-CHEK TYREL PLUS) w/Device kit      • buPROPion XL (WELLBUTRIN XL) 150 MG 24 hr tablet TAKE ONE TABLET BY MOUTH DAILY 90 tablet 3   • cetirizine (zyrTEC) 10 MG tablet Take 1 tablet by mouth Daily. 90 tablet 3   • cyclobenzaprine (FLEXERIL) 10 MG tablet Take 1 tablet by mouth 3 (Three) Times a Day As Needed for Muscle Spasms. 90 tablet 5   • empagliflozin (Jardiance) 25 MG tablet tablet Take 1 tablet by mouth Daily. 90 tablet 3   • fluticasone (FLONASE) 50 MCG/ACT nasal spray 2 sprays by Each Nare route Daily. 48 g 3   • furosemide (LASIX) 40 MG tablet TAKE ONE TABLET BY MOUTH DAILY 90 tablet 3   • gabapentin (NEURONTIN) 400 MG capsule TAKE ONE CAPSULE BY MOUTH THREE TIMES A  capsule 0   • glucose blood test strip Check once daily E11.9 non-iddm;  States [employer] brand covered;  Also disp alcohol swabs, lancets. 50 each 12   • glucose monitor monitoring kit Check once daily d x E11.9 non-iddm 1 each 0   • hydrocortisone 2.5 % cream Apply  topically 2 (two) times a day. Wipe bottom twice daily 28 g 0   • ibuprofen (ADVIL,MOTRIN) 800 MG tablet TAKE ONE TABLET BY MOUTH EVERY 8 HOURS AS NEEDED FOR MILD TO MODERATE PAIN 90 tablet 5   • Lancets misc 1 Device Daily. Dispense brand covered by insurance. Dx. e11.9 100 each 12   • lisinopril (PRINIVIL,ZESTRIL) 10 MG tablet Take 1 tablet by mouth Daily. 90 tablet 3   • magnesium oxide (MAGOX) 400 (241.3 Mg) MG tablet tablet TAKE ONE TABLET BY MOUTH DAILY 30 tablet 9   •  metFORMIN (GLUCOPHAGE) 1000 MG tablet TAKE 1 AND 1/2 TABLETS BY MOUTH IN THE MORNING and 1 tablet in the evening 235 tablet 0   • mupirocin (BACTROBAN) 2 % ointment Apply  topically to the appropriate area as directed 2 (Two) Times a Day. to affected area(s) 30 each 5   • potassium chloride (K-DUR,KLOR-CON) 10 MEQ CR tablet TAKE THREE TABLETS BY MOUTH TWICE A DAY 54 tablet 0   • sildenafil (VIAGRA) 100 MG tablet 1/2 to 1 po daily prn ED 10 tablet 5   • Testosterone 1.62 % gel APPLY 4 PUMP ACTUATIONS (40.5 MG/2.5 GM) TOPICALLY ONCE DAILY (2 PUMPS ON EACH SHOULDER OR INNER THIGH) 150 g 4   • Testosterone 20.25 MG/ACT (1.62%) gel APPLY 1 PUMP DAILY 75 g 2   • tiZANidine (ZANAFLEX) 4 MG tablet Take 1 tablet by mouth Every 8 (Eight) Hours As Needed for Muscle Spasms. 90 tablet 5   • triamcinolone (KENALOG) 0.1 % cream Apply  topically to the appropriate area as directed 2 (Two) Times a Day. 30 g 2   • urea (CARMOL) 40 % cream APPLY TO AFFECTED AREA(S) ON SECOND TOE DAILY AS DIRECTED 28.35 each 5   • [DISCONTINUED] Dulaglutide (Trulicity) 1.5 MG/0.5ML solution pen-injector Inject 1.5 mg under the skin into the appropriate area as directed 1 (One) Time Per Week. 5 pen 5   • exenatide er (BYDUREON) 2 MG/0.85ML auto-injector injection Inject 0.85 mL under the skin into the appropriate area as directed 1 (One) Time Per Week. 3.4 mL 12   • indomethacin (INDOCIN) 25 MG capsule 1 po tid x 5 days, then 1 po bid x 5 days, 1 po qd x 5 days then stop 30 capsule 5   • traZODone (DESYREL) 50 MG tablet Take 1 tablet by mouth Every Night. 30 tablet 5     Facility-Administered Encounter Medications as of 9/23/2022   Medication Dose Route Frequency Provider Last Rate Last Admin   • Testosterone Cypionate (DEPOTESTOTERONE CYPIONATE) injection 100 mg  100 mg Intramuscular Q14 Days Tesfaye Swartz DO   100 mg at 06/25/19 1220       Reviewed use of high risk medication in the elderly: yes  Reviewed for potential of harmful drug  "interactions in the elderly: yes    Follow Up:  Return in about 6 months (around 3/23/2023), or if symptoms worsen or fail to improve.     An After Visit Summary and PPPS with all of these plans were given to the patient.           ++++++++++++++++++++++++++++++++++++++++++++++++++++++++++++++++++     Chief Complaint   Patient presents with   • Medicare Wellness-subsequent   • Diabetes   • Hypertension   • Hyperlipidemia   • Gout   • Hypogonadism     HPI  patietni with dm2, due labs and relates trulicity is $200 a month and cannot afford, needs alternative;  Htn/HLD - bp controlled, due labs;  On statin;  Has hypogonadism on replacement and due full labs today;  Having acute gout attack, toes swollen and red;  Last uric acid high, is on allopurinol.  Has lymphedmea, doing ok;    He reports not able to sleep at night, would like medication.      Review of Systems   Cardiovascular: Positive for leg swelling. Negative for chest pain and palpitations.   Respiratory: Negative for shortness of breath.    Musculoskeletal: Positive for arthralgias and joint swelling.   Gastrointestinal: Negative for abdominal pain.       Social History     Tobacco Use   • Smoking status: Never Smoker   • Smokeless tobacco: Never Used   Substance Use Topics   • Alcohol use: No     O:   Vitals:    09/23/22 0744   BP: 138/78   Pulse: 80   SpO2: 97%   Weight: (!) 161 kg (354 lb)   Height: 190.5 cm (75\")   PainSc: 0-No pain     Body mass index is 44.25 kg/m².  Vitals and nursing note reviewed.   Constitutional:       Appearance: Well-developed.   Musculoskeletal:      Cervical back: Neck supple. Skin:     General: Skin is warm and dry.   Feet:      Comments: Diabetic foot exam and monofilament completed, see scanned report.    Acute gout  Neurological:      Mental Status: Alert.   Psychiatric:         Behavior: Behavior normal.         Diagnoses and all orders for this visit:    1. Medicare annual wellness visit, subsequent (Primary)    2. Acute " idiopathic gout involving toe, bilateral toes  -     indomethacin (INDOCIN) 25 MG capsule; 1 po tid x 5 days, then 1 po bid x 5 days, 1 po qd x 5 days then stop  Dispense: 30 capsule; Refill: 5  -     Comprehensive Metabolic Panel  -     CBC & Differential  -     Uric Acid    3. Other insomnia  -     traZODone (DESYREL) 50 MG tablet; Take 1 tablet by mouth Every Night.  Dispense: 30 tablet; Refill: 5    4. Type 2 diabetes mellitus with diabetic polyneuropathy, without long-term current use of insulin (HCC)  -     exenatide er (BYDUREON) 2 MG/0.85ML auto-injector injection; Inject 0.85 mL under the skin into the appropriate area as directed 1 (One) Time Per Week.  Dispense: 3.4 mL; Refill: 12  -     Microalbumin / Creatinine Urine Ratio - Urine, Clean Catch  -     Lipid Panel  -     Hemoglobin A1c  -     TSH    5. Diabetic peripheral neuropathy (HCC)    6. Lymphedema    7. Benign essential hypertension    8. Hypertriglyceridemia    9. Hypogonadism in male  -     Testosterone    10. Morbid obesity due to excess calories (HCC)    11. Benign prostatic hyperplasia without lower urinary tract symptoms  -     PSA DIAGNOSTIC    12. Vitamin D deficiency  -     Vitamin D 25 Hydroxy    13. Immunization due    14. Flu vaccine need  -     FluLaval/Fluarix/Fluzone >6 Months    15. Morbid (severe) obesity due to excess calories (HCC)    Other orders  -     Cancel: Fluzone High-Dose 65+yrs (4210-7957)    -acute gout - start indocin taper and after uric acid back will likely adjust allopurinol as need to drive uric acid <6 to prevent attacks last check 7.5.  -insomnia - will try trazodone.  -I explained at length to patient risks of replacement that can include acne, mood changes, cardiovascular disease, polycythemia and liver dysfunction.  I also warned about concerns regarding prostate and breast cancer.   I discussed with potential for improvement in fatigue and preventing osteoporosis.  I discussed will need to monitor labs  regularly.  -dm2 - continue medications, recheck labs;   Change trulicity though to bydureon and see if better covered  -HLD - continue statin, recheck lipids.  -lymphedema - continue compression hose  -due vitmain D check  -flu today;  Encouraged take covid 19 booster  -neuropathy - blood sugar control  Next office visit discuss pneumo 20    Return in about 6 months (around 3/23/2023), or if symptoms worsen or fail to improve.

## 2022-09-24 DIAGNOSIS — E55.9 VITAMIN D DEFICIENCY: Primary | ICD-10-CM

## 2022-09-24 LAB
25(OH)D3+25(OH)D2 SERPL-MCNC: 28.9 NG/ML (ref 30–100)
ALBUMIN SERPL-MCNC: 4.8 G/DL (ref 3.5–5.2)
ALBUMIN/CREAT UR: <18 MG/G CREAT (ref 0–29)
ALBUMIN/GLOB SERPL: 2.1 G/DL
ALP SERPL-CCNC: 96 U/L (ref 39–117)
ALT SERPL-CCNC: 47 U/L (ref 1–41)
AST SERPL-CCNC: 42 U/L (ref 1–40)
BASOPHILS # BLD AUTO: 0.02 10*3/MM3 (ref 0–0.2)
BASOPHILS NFR BLD AUTO: 0.3 % (ref 0–1.5)
BILIRUB SERPL-MCNC: 0.7 MG/DL (ref 0–1.2)
BUN SERPL-MCNC: 14 MG/DL (ref 6–20)
BUN/CREAT SERPL: 14.9 (ref 7–25)
CALCIUM SERPL-MCNC: 9.3 MG/DL (ref 8.6–10.5)
CHLORIDE SERPL-SCNC: 96 MMOL/L (ref 98–107)
CHOLEST SERPL-MCNC: 125 MG/DL (ref 0–200)
CO2 SERPL-SCNC: 29.5 MMOL/L (ref 22–29)
CREAT SERPL-MCNC: 0.94 MG/DL (ref 0.76–1.27)
CREAT UR-MCNC: 16.8 MG/DL
EGFRCR SERPLBLD CKD-EPI 2021: 98.1 ML/MIN/1.73
EOSINOPHIL # BLD AUTO: 0.14 10*3/MM3 (ref 0–0.4)
EOSINOPHIL NFR BLD AUTO: 2.3 % (ref 0.3–6.2)
ERYTHROCYTE [DISTWIDTH] IN BLOOD BY AUTOMATED COUNT: 13.6 % (ref 12.3–15.4)
GLOBULIN SER CALC-MCNC: 2.3 GM/DL
GLUCOSE SERPL-MCNC: 154 MG/DL (ref 65–99)
HBA1C MFR BLD: 7.3 % (ref 4.8–5.6)
HCT VFR BLD AUTO: 48 % (ref 37.5–51)
HDLC SERPL-MCNC: 37 MG/DL (ref 40–60)
HGB BLD-MCNC: 16.1 G/DL (ref 13–17.7)
IMM GRANULOCYTES # BLD AUTO: 0.01 10*3/MM3 (ref 0–0.05)
IMM GRANULOCYTES NFR BLD AUTO: 0.2 % (ref 0–0.5)
LDLC SERPL CALC-MCNC: 45 MG/DL (ref 0–100)
LYMPHOCYTES # BLD AUTO: 1.77 10*3/MM3 (ref 0.7–3.1)
LYMPHOCYTES NFR BLD AUTO: 29.2 % (ref 19.6–45.3)
MCH RBC QN AUTO: 29.8 PG (ref 26.6–33)
MCHC RBC AUTO-ENTMCNC: 33.5 G/DL (ref 31.5–35.7)
MCV RBC AUTO: 88.9 FL (ref 79–97)
MICROALBUMIN UR-MCNC: <3 UG/ML
MONOCYTES # BLD AUTO: 0.43 10*3/MM3 (ref 0.1–0.9)
MONOCYTES NFR BLD AUTO: 7.1 % (ref 5–12)
NEUTROPHILS # BLD AUTO: 3.69 10*3/MM3 (ref 1.7–7)
NEUTROPHILS NFR BLD AUTO: 60.9 % (ref 42.7–76)
NRBC BLD AUTO-RTO: 0 /100 WBC (ref 0–0.2)
PLATELET # BLD AUTO: 151 10*3/MM3 (ref 140–450)
POTASSIUM SERPL-SCNC: 4.1 MMOL/L (ref 3.5–5.2)
PROT SERPL-MCNC: 7.1 G/DL (ref 6–8.5)
PSA SERPL-MCNC: 0.33 NG/ML (ref 0–4)
RBC # BLD AUTO: 5.4 10*6/MM3 (ref 4.14–5.8)
SODIUM SERPL-SCNC: 139 MMOL/L (ref 136–145)
TESTOST SERPL-MCNC: 340 NG/DL (ref 264–916)
TRIGL SERPL-MCNC: 281 MG/DL (ref 0–150)
TSH SERPL DL<=0.005 MIU/L-ACNC: 2.72 UIU/ML (ref 0.27–4.2)
URATE SERPL-MCNC: 6.3 MG/DL (ref 3.4–7)
VLDLC SERPL CALC-MCNC: 43 MG/DL (ref 5–40)
WBC # BLD AUTO: 6.06 10*3/MM3 (ref 3.4–10.8)

## 2022-09-24 NOTE — PROGRESS NOTES
Diabetes is almost goal, was bydureon covered?  Would start this if is covered.  Cholesterol is adequately controlled.  Vitamin D low - if not taking any or taking low dose would increase to vitamin D3 5000 iu daily  Liver enzymes stable elevation  Testosterone normal range  Blood counts normal  Rest of labs normal or stable.

## 2022-10-04 RX ORDER — EMPAGLIFLOZIN 25 MG/1
TABLET, FILM COATED ORAL
Qty: 30 TABLET | Refills: 0 | Status: SHIPPED | OUTPATIENT
Start: 2022-10-04 | End: 2022-11-04 | Stop reason: SDUPTHER

## 2022-10-26 DIAGNOSIS — E11.42 TYPE 2 DIABETES MELLITUS WITH DIABETIC POLYNEUROPATHY, WITHOUT LONG-TERM CURRENT USE OF INSULIN: ICD-10-CM

## 2022-10-26 DIAGNOSIS — E78.1 HYPERTRIGLYCERIDEMIA: ICD-10-CM

## 2022-10-26 RX ORDER — ATORVASTATIN CALCIUM 20 MG/1
TABLET, FILM COATED ORAL
Qty: 90 TABLET | Refills: 0 | Status: SHIPPED | OUTPATIENT
Start: 2022-10-26 | End: 2022-11-14

## 2022-10-27 ENCOUNTER — TELEPHONE (OUTPATIENT)
Dept: FAMILY MEDICINE CLINIC | Facility: CLINIC | Age: 52
End: 2022-10-27

## 2022-10-27 NOTE — TELEPHONE ENCOUNTER
Caller: Marcial Desai     Relationship: [unfilled]     Best call back number: [phone]    What is your medical concern? PATIENT CALLING WANTING TO KNOW WHAT A NORMAL BLOOD PRESSURE IS HE STATED HE IS USING A WRIST BLOOD PRESSURE MONITOR HE STATED THAT WHEN HE TAKES IT HAS BEEN 88/48-50 FOR THE BOTTOM NUMBER. PATIENT WOULD LIKE TO KNOW IF ONE OF THE NEW MEDICATIONS COULD BE CAUSING THE CHANGE IN HIS BLOOD PRESSURE HE WOULD LIKE TO KNOW IF HE SHOULD COME IN AND GET IT CHECKED TO MAKE SURE IT IS THE RIGHT READING OR WHAT HE SHOULD DO.    PLEASE ADVISE

## 2022-10-28 NOTE — TELEPHONE ENCOUNTER
Per pt, his BP has been low on the wrist BP cuff. He made nurse appt for Wed, 11/2 at 8AM for BP check

## 2022-11-02 ENCOUNTER — CLINICAL SUPPORT (OUTPATIENT)
Dept: FAMILY MEDICINE CLINIC | Facility: CLINIC | Age: 52
End: 2022-11-02

## 2022-11-02 ENCOUNTER — TELEPHONE (OUTPATIENT)
Dept: FAMILY MEDICINE CLINIC | Facility: CLINIC | Age: 52
End: 2022-11-02

## 2022-11-02 VITALS — BODY MASS INDEX: 44.25 KG/M2 | HEIGHT: 75 IN | SYSTOLIC BLOOD PRESSURE: 122 MMHG | DIASTOLIC BLOOD PRESSURE: 82 MMHG

## 2022-11-02 NOTE — TELEPHONE ENCOUNTER
PATIENT HAS BEEN COMPLAINING ABOUT HAVING LOW B/P AND DIZZINESS CONFUSION AND WEAK FEELING AND HE STATED IT STARTED AFTER HE STARTED TAKING TRAZODONE AND INDOMETHACIN. HE QUIT TAKING IT AND SYMPTOMS WENT AWAY. HE IS CONCERNED IT IS A REACTION TO THE MEDS AND IF HE NEEEDS SOMETHING DIFFERENT AND TO QUIT TAKING THIS MED. PLEASE ADVISE.

## 2022-11-04 DIAGNOSIS — E11.42 TYPE 2 DIABETES MELLITUS WITH DIABETIC POLYNEUROPATHY, WITHOUT LONG-TERM CURRENT USE OF INSULIN: ICD-10-CM

## 2022-11-04 RX ORDER — LISINOPRIL 10 MG/1
10 TABLET ORAL DAILY
Qty: 90 TABLET | Refills: 3 | Status: SHIPPED | OUTPATIENT
Start: 2022-11-04

## 2022-11-09 ENCOUNTER — TELEPHONE (OUTPATIENT)
Dept: FAMILY MEDICINE CLINIC | Facility: CLINIC | Age: 52
End: 2022-11-09

## 2022-11-09 DIAGNOSIS — E11.42 TYPE 2 DIABETES MELLITUS WITH DIABETIC POLYNEUROPATHY, WITHOUT LONG-TERM CURRENT USE OF INSULIN: Primary | ICD-10-CM

## 2022-11-09 NOTE — TELEPHONE ENCOUNTER
Caitie calling on pt states he is supposed to have refills on Trulicity which last was filled 08/04/22 on a written rx which was given to pt last year but med is not on current med list Bydureon is and it is over 225.00 dollars for pt please advise which med pt is supposed to be on

## 2022-11-14 DIAGNOSIS — E11.42 TYPE 2 DIABETES MELLITUS WITH DIABETIC POLYNEUROPATHY, WITHOUT LONG-TERM CURRENT USE OF INSULIN: ICD-10-CM

## 2022-11-14 DIAGNOSIS — E78.1 HYPERTRIGLYCERIDEMIA: ICD-10-CM

## 2022-11-14 RX ORDER — ATORVASTATIN CALCIUM 20 MG/1
TABLET, FILM COATED ORAL
Qty: 90 TABLET | Refills: 2 | Status: SHIPPED | OUTPATIENT
Start: 2022-11-14

## 2022-12-05 DIAGNOSIS — L84 PRE-ULCERATIVE CORN OR CALLOUS: ICD-10-CM

## 2022-12-05 DIAGNOSIS — E29.1 HYPOGONADISM IN MALE: ICD-10-CM

## 2022-12-06 RX ORDER — UREA 40 %
CREAM (GRAM) TOPICAL
Qty: 28.35 EACH | Refills: 5 | Status: SHIPPED | OUTPATIENT
Start: 2022-12-06

## 2022-12-06 RX ORDER — TESTOSTERONE 16.2 MG/G
GEL TRANSDERMAL
Qty: 150 G | Refills: 0 | Status: SHIPPED | OUTPATIENT
Start: 2022-12-06

## 2022-12-06 RX ORDER — EMPAGLIFLOZIN 25 MG/1
TABLET, FILM COATED ORAL
Qty: 30 TABLET | Refills: 0 | Status: SHIPPED | OUTPATIENT
Start: 2022-12-06 | End: 2023-01-06

## 2022-12-09 ENCOUNTER — PRIOR AUTHORIZATION (OUTPATIENT)
Dept: FAMILY MEDICINE CLINIC | Facility: CLINIC | Age: 52
End: 2022-12-09

## 2022-12-15 DIAGNOSIS — E11.42 TYPE 2 DIABETES MELLITUS WITH DIABETIC POLYNEUROPATHY, WITHOUT LONG-TERM CURRENT USE OF INSULIN: ICD-10-CM

## 2022-12-16 DIAGNOSIS — E11.42 TYPE 2 DIABETES MELLITUS WITH DIABETIC POLYNEUROPATHY, WITHOUT LONG-TERM CURRENT USE OF INSULIN: ICD-10-CM

## 2023-01-06 RX ORDER — EMPAGLIFLOZIN 25 MG/1
TABLET, FILM COATED ORAL
Qty: 30 TABLET | Refills: 2 | Status: SHIPPED | OUTPATIENT
Start: 2023-01-06 | End: 2023-03-31 | Stop reason: SDUPTHER

## 2023-03-14 DIAGNOSIS — M1A.09X0 IDIOPATHIC CHRONIC GOUT OF MULTIPLE SITES WITHOUT TOPHUS: ICD-10-CM

## 2023-03-14 RX ORDER — IBUPROFEN 800 MG/1
800 TABLET ORAL EVERY 8 HOURS PRN
Qty: 90 TABLET | Refills: 5 | Status: SHIPPED | OUTPATIENT
Start: 2023-03-14

## 2023-03-30 DIAGNOSIS — E11.42 TYPE 2 DIABETES MELLITUS WITH DIABETIC POLYNEUROPATHY, WITHOUT LONG-TERM CURRENT USE OF INSULIN: ICD-10-CM

## 2023-03-31 ENCOUNTER — OFFICE VISIT (OUTPATIENT)
Dept: FAMILY MEDICINE CLINIC | Facility: CLINIC | Age: 53
End: 2023-03-31
Payer: COMMERCIAL

## 2023-03-31 VITALS
WEIGHT: 315 LBS | TEMPERATURE: 96.8 F | DIASTOLIC BLOOD PRESSURE: 64 MMHG | RESPIRATION RATE: 16 BRPM | HEART RATE: 92 BPM | OXYGEN SATURATION: 96 % | HEIGHT: 76 IN | SYSTOLIC BLOOD PRESSURE: 120 MMHG | BODY MASS INDEX: 38.36 KG/M2

## 2023-03-31 DIAGNOSIS — Z00.00 WELLNESS EXAMINATION: Primary | ICD-10-CM

## 2023-03-31 DIAGNOSIS — M67.40 GANGLION CYST: ICD-10-CM

## 2023-03-31 DIAGNOSIS — E11.42 TYPE 2 DIABETES MELLITUS WITH DIABETIC POLYNEUROPATHY, WITHOUT LONG-TERM CURRENT USE OF INSULIN: ICD-10-CM

## 2023-03-31 DIAGNOSIS — M1A.09X0 IDIOPATHIC CHRONIC GOUT OF MULTIPLE SITES WITHOUT TOPHUS: ICD-10-CM

## 2023-03-31 DIAGNOSIS — E55.9 VITAMIN D DEFICIENCY: ICD-10-CM

## 2023-03-31 DIAGNOSIS — E29.1 HYPOGONADISM IN MALE: ICD-10-CM

## 2023-03-31 DIAGNOSIS — I10 BENIGN ESSENTIAL HYPERTENSION: ICD-10-CM

## 2023-03-31 DIAGNOSIS — E11.42 DIABETIC PERIPHERAL NEUROPATHY: ICD-10-CM

## 2023-03-31 DIAGNOSIS — I89.0 LYMPHEDEMA: ICD-10-CM

## 2023-03-31 DIAGNOSIS — N40.0 BENIGN PROSTATIC HYPERPLASIA WITHOUT LOWER URINARY TRACT SYMPTOMS: ICD-10-CM

## 2023-03-31 DIAGNOSIS — E78.1 HYPERTRIGLYCERIDEMIA: ICD-10-CM

## 2023-03-31 DIAGNOSIS — E66.01 MORBID OBESITY DUE TO EXCESS CALORIES: ICD-10-CM

## 2023-03-31 PROCEDURE — 99396 PREV VISIT EST AGE 40-64: CPT | Performed by: FAMILY MEDICINE

## 2023-03-31 RX ORDER — DULAGLUTIDE 1.5 MG/.5ML
1.5 INJECTION, SOLUTION SUBCUTANEOUS WEEKLY
Qty: 6 ML | Refills: 1 | Status: SHIPPED | OUTPATIENT
Start: 2023-03-31

## 2023-03-31 NOTE — PATIENT INSTRUCTIONS
"\"6-4-3-Almost None!\"  Healthy Habits Start Early    EAT 5 OR MORE SERVINGS OF VEGETABLES AND FRUITS EVERY DAY.    Help Marcial get three vegetables and two fruits each day. Red, green, yellow, orange...encourage them to try all the colors so they can enjoy different flavors and get more vitamins.    How can I help Marcial do this?  ---------------------------------------------  -BE PATIENT WITH Marcial, remember it may take 10 times before they start to like new food. So, start with small bites and just keep trying.  -Serve at least one vegetable or fruit at every meal. Even try two. Remember, portions do not have to be as big as you think.  -Encourage eating fruits and vegetables instead of drinking them..it's a better way to get fiber and vitamins..so limit the amount of juice to 1/2 cup per day for children 1-6 years and one cup per day for children 7-18 years of age. Try using 1/2 part water and 1/2 part juice.    Spend less than two hours per day watching television and other screen media. Screen media includes video games, movies and computer use for entertainment.    How can I help Marcial do this?  -Turn off the TV at dinner. Dinner is the best time to hang out with your kids and just talk, learn about their day, and tell them about your day. Your kids have a lot to learn from you and dinner is a great time to share.  "

## 2023-03-31 NOTE — PROGRESS NOTES
Subjective   Marcial Desai is a 53 y.o. male. Presents today for   Chief Complaint   Patient presents with   • Diabetes   • Hypertension   • Med Refill   • Annual Exam   • Cyst     C/o knot on clavicle bone x6-7 months       History of Present Illness  Patient here for wellness exam;  Has dm2, htn, hld, gout, hypogonadism and lymphedema;  Trying lose weight, back up gain;  No cp/soa;  Lymphedema about same bu tstable for him;       Cyst on shoulder, nonbothersome - counseled feels like gangloiin cyst as bening  Review of Systems   Respiratory: Negative for shortness of breath.    Cardiovascular: Positive for leg swelling. Negative for chest pain and palpitations.       Patient Active Problem List   Diagnosis   • Ankle pain   • Atopic dermatitis   • Benign essential hypertension   • Type 2 diabetes mellitus with diabetic polyneuropathy, without long-term current use of insulin (HCC)   • Diabetic peripheral neuropathy (HCC)   • Abnormal liver function tests   • Idiopathic chronic gout of multiple sites without tophus   • Hypertriglyceridemia   • Cramps of lower extremity   • Edema of lower extremity   • Lymphedema   • Osteoarthritis   • Morbid obesity due to excess calories (HCC)   • Hypogonadism in male       Social History     Socioeconomic History   • Marital status:    Tobacco Use   • Smoking status: Never   • Smokeless tobacco: Never   Vaping Use   • Vaping Use: Never used   Substance and Sexual Activity   • Alcohol use: No   • Drug use: No   • Sexual activity: Defer       Allergies   Allergen Reactions   • Cortisone Shortness Of Breath       Current Outpatient Medications on File Prior to Visit   Medication Sig Dispense Refill   • ACCU-CHEK TYREL PLUS test strip TEST DAILY 50 each 2   • allopurinol (ZYLOPRIM) 300 MG tablet Take 1 tablet by mouth Daily. 90 tablet 1   • ammonium lactate (Lac-Hydrin) 12 % cream Apply  topically to the appropriate area as directed As Needed for Dry Skin (venosu stasis  dermatitis). 385 g 5   • atorvastatin (LIPITOR) 20 MG tablet TAKE 1 TABLET BY MOUTH AT BEDTIME 90 tablet 2   • Blood Glucose Monitoring Suppl (ACCU-CHEK TYREL PLUS) w/Device kit      • buPROPion XL (WELLBUTRIN XL) 150 MG 24 hr tablet TAKE ONE TABLET BY MOUTH DAILY 90 tablet 3   • cetirizine (zyrTEC) 10 MG tablet Take 1 tablet by mouth Daily. 90 tablet 3   • cyclobenzaprine (FLEXERIL) 10 MG tablet Take 1 tablet by mouth 3 (Three) Times a Day As Needed for Muscle Spasms. 90 tablet 5   • Dulaglutide 0.75 MG/0.5ML solution pen-injector Inject 0.75 mg under the skin into the appropriate area as directed Every 7 (Seven) Days. 90 mL 3   • fluticasone (FLONASE) 50 MCG/ACT nasal spray 2 sprays by Each Nare route Daily. 48 g 3   • furosemide (LASIX) 40 MG tablet TAKE ONE TABLET BY MOUTH DAILY 90 tablet 3   • gabapentin (NEURONTIN) 400 MG capsule TAKE ONE CAPSULE BY MOUTH THREE TIMES A  capsule 0   • glucose blood test strip Check once daily E11.9 non-iddm;  States [employer] brand covered;  Also disp alcohol swabs, lancets. 50 each 12   • glucose monitor monitoring kit Check once daily d x E11.9 non-iddm 1 each 0   • hydrocortisone 2.5 % cream Apply  topically 2 (two) times a day. Wipe bottom twice daily 28 g 0   • ibuprofen (ADVIL,MOTRIN) 800 MG tablet Take 1 tablet by mouth Every 8 (Eight) Hours As Needed for Mild Pain. 90 tablet 5   • indomethacin (INDOCIN) 25 MG capsule 1 po tid x 5 days, then 1 po bid x 5 days, 1 po qd x 5 days then stop 30 capsule 5   • Jardiance 25 MG tablet tablet TAKE ONE TABLET BY MOUTH DAILY 30 tablet 2   • Lancets misc 1 Device Daily. Dispense brand covered by insurance. Dx. e11.9 100 each 12   • lisinopril (PRINIVIL,ZESTRIL) 10 MG tablet Take 1 tablet by mouth Daily. 90 tablet 3   • magnesium oxide (MAGOX) 400 (241.3 Mg) MG tablet tablet TAKE ONE TABLET BY MOUTH DAILY 30 tablet 9   • metFORMIN (GLUCOPHAGE) 1000 MG tablet TAKE 1 AND 1/2 TABLET BY MOUTH EVERY MORNING TAKE ONE TABLET BY MOUTH  EVERY EVENING 235 tablet 03   • mupirocin (BACTROBAN) 2 % ointment Apply  topically to the appropriate area as directed 2 (Two) Times a Day. to affected area(s) 30 each 5   • potassium chloride (K-DUR,KLOR-CON) 10 MEQ CR tablet TAKE THREE TABLETS BY MOUTH TWICE A DAY 54 tablet 0   • sildenafil (VIAGRA) 100 MG tablet 1/2 to 1 po daily prn ED 10 tablet 5   • Testosterone 20.25 MG/ACT (1.62%) gel APPLY 4 PUMP ACTUATIONS (40.5 MG/2.5 GM) TOPICALLY ONCE DAILY IN THE MORNING (APPLY 2 PUMPS ON EACH SHOULDER OR INNER THIGH) 150 g 0   • triamcinolone (KENALOG) 0.1 % cream Apply  topically to the appropriate area as directed 2 (Two) Times a Day. 30 g 2   • urea (CARMOL) 40 % cream APPLY TO AFFECTED AREA(S) ON SECOND TOE DAILY AS DIRECTED 28.35 each 5   • vitamin D3 125 MCG (5000 UT) capsule capsule Take 1 capsule by mouth Daily. 30 capsule 6   • Testosterone 1.62 % gel APPLY 4 PUMP ACTUATIONS (40.5 MG/2.5 GM) TOPICALLY ONCE DAILY (2 PUMPS ON EACH SHOULDER OR INNER THIGH) (Patient not taking: Reported on 3/31/2023) 150 g 4   • tiZANidine (ZANAFLEX) 4 MG tablet Take 1 tablet by mouth Every 8 (Eight) Hours As Needed for Muscle Spasms. 90 tablet 5   • traZODone (DESYREL) 50 MG tablet Take 1 tablet by mouth Every Night. (Patient not taking: Reported on 3/31/2023) 30 tablet 5   • [DISCONTINUED] cefdinir (OMNICEF) 300 MG capsule Take 1 capsule by mouth 2 (Two) Times a Day. (Patient not taking: Reported on 3/31/2023) 20 capsule 0   • [DISCONTINUED] metFORMIN (GLUCOPHAGE) 1000 MG tablet TAKE 1 AND 1/2 TABLETS BY MOUTH IN THE MORNING AND 1 TAB BY MOUTH IN THE EVENING 235 tablet 0     Current Facility-Administered Medications on File Prior to Visit   Medication Dose Route Frequency Provider Last Rate Last Admin   • Testosterone Cypionate (DEPOTESTOTERONE CYPIONATE) injection 100 mg  100 mg Intramuscular Q14 Days Tesfaye Swartz DO   100 mg at 06/25/19 1220       Objective   Vitals:    03/31/23 0834   BP: 120/64   Pulse: 92   Resp: 16  "  Temp: 96.8 °F (36 °C)   TempSrc: Temporal   SpO2: 96%   Weight: (!) 161 kg (356 lb)   Height: 193 cm (76\")   PainSc: 0-No pain     Body mass index is 43.33 kg/m².    Physical Exam  Vitals and nursing note reviewed.   Constitutional:       Appearance: He is well-developed.   HENT:      Head: Normocephalic and atraumatic.   Neck:      Thyroid: No thyromegaly.      Vascular: No JVD.   Cardiovascular:      Rate and Rhythm: Normal rate and regular rhythm.      Heart sounds: Normal heart sounds. No murmur heard.    No friction rub. No gallop.   Pulmonary:      Effort: Pulmonary effort is normal. No respiratory distress.      Breath sounds: Normal breath sounds. No wheezing or rales.   Abdominal:      General: Bowel sounds are normal. There is no distension.      Palpations: Abdomen is soft.      Tenderness: There is no abdominal tenderness. There is no guarding or rebound.   Musculoskeletal:        Arms:       Cervical back: Neck supple.      Right lower leg: Edema present.      Left lower leg: Edema present.   Skin:     General: Skin is warm and dry.   Neurological:      Mental Status: He is alert.   Psychiatric:         Behavior: Behavior normal.         Assessment & Plan   Diagnoses and all orders for this visit:    1. Wellness examination (Primary)    2. Type 2 diabetes mellitus with diabetic polyneuropathy, without long-term current use of insulin (HCC)  -     empagliflozin (Jardiance) 25 MG tablet tablet; Take 1 tablet by mouth Daily.  Dispense: 30 tablet; Refill: 6  -     Microalbumin / Creatinine Urine Ratio - Urine, Clean Catch  -     Lipid Panel  -     Comprehensive Metabolic Panel  -     Hemoglobin A1c  -     Dulaglutide (Trulicity) 1.5 MG/0.5ML solution pen-injector; Inject 1.5 mg under the skin into the appropriate area as directed 1 (One) Time Per Week.  Dispense: 6 mL; Refill: 1    3. Morbid obesity due to excess calories (HCC)  -     Dulaglutide (Trulicity) 1.5 MG/0.5ML solution pen-injector; Inject 1.5 " mg under the skin into the appropriate area as directed 1 (One) Time Per Week.  Dispense: 6 mL; Refill: 1    4. Idiopathic chronic gout of multiple sites without tophus  -     Uric Acid    5. Hypogonadism in male  -     Comprehensive Metabolic Panel  -     CBC & Differential  -     Testosterone    6. Hypertriglyceridemia  -     Lipid Panel  -     Comprehensive Metabolic Panel    7. Vitamin D deficiency  -     Vitamin D,25-Hydroxy    8. Diabetic peripheral neuropathy (HCC)    9. Lymphedema    10. Benign essential hypertension  -     Comprehensive Metabolic Panel    11. Benign prostatic hyperplasia without lower urinary tract symptoms  -     PSA DIAGNOSTIC    12. Ganglion cyst - left clavicle    counseled on die tand exercise  Labs for above  Counseled on weight loss and will go up on trulicity to help more as also dm2         -Follow up: 6 months and prn

## 2023-04-01 LAB
25(OH)D3+25(OH)D2 SERPL-MCNC: 40.8 NG/ML (ref 30–100)
ALBUMIN SERPL-MCNC: 4.7 G/DL (ref 3.5–5.2)
ALBUMIN/CREAT UR: <5 MG/G CREAT (ref 0–29)
ALBUMIN/GLOB SERPL: 1.9 G/DL
ALP SERPL-CCNC: 95 U/L (ref 39–117)
ALT SERPL-CCNC: 53 U/L (ref 1–41)
AST SERPL-CCNC: 43 U/L (ref 1–40)
BASOPHILS # BLD AUTO: 0.02 10*3/MM3 (ref 0–0.2)
BASOPHILS NFR BLD AUTO: 0.4 % (ref 0–1.5)
BILIRUB SERPL-MCNC: 0.5 MG/DL (ref 0–1.2)
BUN SERPL-MCNC: 17 MG/DL (ref 6–20)
BUN/CREAT SERPL: 17.3 (ref 7–25)
CALCIUM SERPL-MCNC: 10 MG/DL (ref 8.6–10.5)
CHLORIDE SERPL-SCNC: 96 MMOL/L (ref 98–107)
CHOLEST SERPL-MCNC: 125 MG/DL (ref 0–200)
CO2 SERPL-SCNC: 28 MMOL/L (ref 22–29)
CREAT SERPL-MCNC: 0.98 MG/DL (ref 0.76–1.27)
CREAT UR-MCNC: 65.8 MG/DL
EGFRCR SERPLBLD CKD-EPI 2021: 92.8 ML/MIN/1.73
EOSINOPHIL # BLD AUTO: 0.1 10*3/MM3 (ref 0–0.4)
EOSINOPHIL NFR BLD AUTO: 2 % (ref 0.3–6.2)
ERYTHROCYTE [DISTWIDTH] IN BLOOD BY AUTOMATED COUNT: 13.5 % (ref 12.3–15.4)
GLOBULIN SER CALC-MCNC: 2.5 GM/DL
GLUCOSE SERPL-MCNC: 169 MG/DL (ref 65–99)
HBA1C MFR BLD: 8.2 % (ref 4.8–5.6)
HCT VFR BLD AUTO: 44 % (ref 37.5–51)
HDLC SERPL-MCNC: 30 MG/DL (ref 40–60)
HGB BLD-MCNC: 15 G/DL (ref 13–17.7)
IMM GRANULOCYTES # BLD AUTO: 0.01 10*3/MM3 (ref 0–0.05)
IMM GRANULOCYTES NFR BLD AUTO: 0.2 % (ref 0–0.5)
LDLC SERPL CALC-MCNC: 44 MG/DL (ref 0–100)
LYMPHOCYTES # BLD AUTO: 1.6 10*3/MM3 (ref 0.7–3.1)
LYMPHOCYTES NFR BLD AUTO: 31.3 % (ref 19.6–45.3)
MCH RBC QN AUTO: 29.7 PG (ref 26.6–33)
MCHC RBC AUTO-ENTMCNC: 34.1 G/DL (ref 31.5–35.7)
MCV RBC AUTO: 87.1 FL (ref 79–97)
MICROALBUMIN UR-MCNC: <3 UG/ML
MONOCYTES # BLD AUTO: 0.37 10*3/MM3 (ref 0.1–0.9)
MONOCYTES NFR BLD AUTO: 7.2 % (ref 5–12)
NEUTROPHILS # BLD AUTO: 3.01 10*3/MM3 (ref 1.7–7)
NEUTROPHILS NFR BLD AUTO: 58.9 % (ref 42.7–76)
NRBC BLD AUTO-RTO: 0 /100 WBC (ref 0–0.2)
PLATELET # BLD AUTO: 151 10*3/MM3 (ref 140–450)
POTASSIUM SERPL-SCNC: 4 MMOL/L (ref 3.5–5.2)
PROT SERPL-MCNC: 7.2 G/DL (ref 6–8.5)
PSA SERPL-MCNC: 0.3 NG/ML (ref 0–4)
RBC # BLD AUTO: 5.05 10*6/MM3 (ref 4.14–5.8)
SODIUM SERPL-SCNC: 137 MMOL/L (ref 136–145)
TESTOST SERPL-MCNC: 229 NG/DL (ref 264–916)
TRIGL SERPL-MCNC: 342 MG/DL (ref 0–150)
URATE SERPL-MCNC: 7.2 MG/DL (ref 3.4–7)
VLDLC SERPL CALC-MCNC: 51 MG/DL (ref 5–40)
WBC # BLD AUTO: 5.11 10*3/MM3 (ref 3.4–10.8)

## 2023-04-12 DIAGNOSIS — I10 BENIGN ESSENTIAL HYPERTENSION: ICD-10-CM

## 2023-04-12 DIAGNOSIS — I10 BENIGN ESSENTIAL HYPERTENSION: Primary | ICD-10-CM

## 2023-04-12 DIAGNOSIS — E29.1 HYPOGONADISM IN MALE: ICD-10-CM

## 2023-04-12 DIAGNOSIS — M1A.09X0 IDIOPATHIC CHRONIC GOUT OF MULTIPLE SITES WITHOUT TOPHUS: ICD-10-CM

## 2023-04-12 RX ORDER — TESTOSTERONE 16.2 MG/G
GEL TRANSDERMAL
Qty: 175 G | Refills: 0 | Status: CANCELLED | OUTPATIENT
Start: 2023-04-12

## 2023-04-12 RX ORDER — ALLOPURINOL 100 MG/1
200 TABLET ORAL 2 TIMES DAILY
Qty: 360 TABLET | Refills: 1 | Status: SHIPPED | OUTPATIENT
Start: 2023-04-12 | End: 2023-04-12 | Stop reason: SDUPTHER

## 2023-04-12 RX ORDER — ALLOPURINOL 100 MG/1
100 TABLET ORAL DAILY
COMMUNITY
End: 2023-04-12 | Stop reason: SDUPTHER

## 2023-04-12 RX ORDER — ALLOPURINOL 100 MG/1
200 TABLET ORAL 2 TIMES DAILY
Qty: 360 TABLET | Refills: 3 | Status: SHIPPED | OUTPATIENT
Start: 2023-04-12

## 2023-04-12 RX ORDER — TESTOSTERONE 16.2 MG/G
GEL TRANSDERMAL
Qty: 175 G | Refills: 0 | Status: SHIPPED | OUTPATIENT
Start: 2023-04-12

## 2023-05-17 DIAGNOSIS — E29.1 HYPOGONADISM IN MALE: ICD-10-CM

## 2023-05-17 RX ORDER — TESTOSTERONE 16.2 MG/G
GEL TRANSDERMAL
Qty: 175 G | Refills: 0 | Status: SHIPPED | OUTPATIENT
Start: 2023-05-17

## 2023-06-04 DIAGNOSIS — J30.1 SEASONAL ALLERGIC RHINITIS DUE TO POLLEN: ICD-10-CM

## 2023-06-05 RX ORDER — CETIRIZINE HYDROCHLORIDE 10 MG/1
TABLET ORAL
Qty: 90 TABLET | Refills: 3 | Status: SHIPPED | OUTPATIENT
Start: 2023-06-05

## 2023-06-08 ENCOUNTER — TELEPHONE (OUTPATIENT)
Dept: FAMILY MEDICINE CLINIC | Facility: CLINIC | Age: 53
End: 2023-06-08
Payer: COMMERCIAL

## 2023-06-08 NOTE — TELEPHONE ENCOUNTER
PATIENT CALLED STATING THAT HIS INSURANCE WILL NO LONGER COVER THE TRULICITY OR THE JARDIANCE - TOTALING ABOUT $1500 PER MONTH FOR BOTH MEDICATIONS, WHICH HE CANNOT AFFORD.    PATIENT WOULD LIKE TO KNOW WHAT DR. MARROQUIN SUGGESTS    PLEASE ADVISE  [phone]

## 2023-06-12 NOTE — TELEPHONE ENCOUNTER
The system still says on forumlary, did insurance give him any idea on what covered?   If not, to replace jardiance try farxiga 10mg 1 po daily, #30 5 ref, make sure use discount card.   To replace trulicity, try rybelsus - give 3mg to take 30 days 1 po daily and send 7mg take 1 po daily #30 5 ref, make sure use discount card.   Let know if still expensive.  RRJ

## 2023-06-13 DIAGNOSIS — E11.42 TYPE 2 DIABETES MELLITUS WITH DIABETIC POLYNEUROPATHY, WITHOUT LONG-TERM CURRENT USE OF INSULIN: Primary | ICD-10-CM

## 2023-06-13 RX ORDER — ORAL SEMAGLUTIDE 3 MG/1
3 TABLET ORAL DAILY
Qty: 30 TABLET | Refills: 0 | Status: SHIPPED | OUTPATIENT
Start: 2023-06-13

## 2023-06-13 RX ORDER — ORAL SEMAGLUTIDE 7 MG/1
7 TABLET ORAL DAILY
Qty: 30 TABLET | Refills: 5 | Status: SHIPPED | OUTPATIENT
Start: 2023-06-13

## 2023-06-13 RX ORDER — DAPAGLIFLOZIN 10 MG/1
10 TABLET, FILM COATED ORAL DAILY
Qty: 30 TABLET | Refills: 5 | Status: SHIPPED | OUTPATIENT
Start: 2023-06-13

## 2023-06-13 RX ORDER — DAPAGLIFLOZIN 10 MG/1
10 TABLET, FILM COATED ORAL DAILY
COMMUNITY
End: 2023-06-13 | Stop reason: SDUPTHER

## 2023-06-13 RX ORDER — ORAL SEMAGLUTIDE 3 MG/1
3 TABLET ORAL DAILY
COMMUNITY
End: 2023-06-13 | Stop reason: SDUPTHER

## 2023-06-13 RX ORDER — ORAL SEMAGLUTIDE 7 MG/1
7 TABLET ORAL DAILY
COMMUNITY
End: 2023-06-13 | Stop reason: SDUPTHER

## 2023-09-01 DIAGNOSIS — R60.0 EDEMA OF LOWER EXTREMITY: ICD-10-CM

## 2023-09-01 DIAGNOSIS — E11.42 TYPE 2 DIABETES MELLITUS WITH DIABETIC POLYNEUROPATHY, WITHOUT LONG-TERM CURRENT USE OF INSULIN: ICD-10-CM

## 2023-09-01 DIAGNOSIS — F32.2 SEVERE SINGLE CURRENT EPISODE OF MAJOR DEPRESSIVE DISORDER, WITHOUT PSYCHOTIC FEATURES: ICD-10-CM

## 2023-09-01 RX ORDER — FUROSEMIDE 40 MG/1
TABLET ORAL
Qty: 90 TABLET | Refills: 3 | Status: SHIPPED | OUTPATIENT
Start: 2023-09-01

## 2023-09-01 RX ORDER — BUPROPION HYDROCHLORIDE 150 MG/1
TABLET ORAL
Qty: 90 TABLET | Refills: 3 | Status: SHIPPED | OUTPATIENT
Start: 2023-09-01

## 2023-09-17 DIAGNOSIS — E78.1 HYPERTRIGLYCERIDEMIA: ICD-10-CM

## 2023-09-17 DIAGNOSIS — E11.42 TYPE 2 DIABETES MELLITUS WITH DIABETIC POLYNEUROPATHY, WITHOUT LONG-TERM CURRENT USE OF INSULIN: ICD-10-CM

## 2023-09-17 RX ORDER — ATORVASTATIN CALCIUM 20 MG/1
TABLET, FILM COATED ORAL
Qty: 90 TABLET | Refills: 0 | Status: SHIPPED | OUTPATIENT
Start: 2023-09-17

## 2023-09-29 DIAGNOSIS — E78.1 HYPERTRIGLYCERIDEMIA: ICD-10-CM

## 2023-09-29 DIAGNOSIS — E11.42 TYPE 2 DIABETES MELLITUS WITH DIABETIC POLYNEUROPATHY, WITHOUT LONG-TERM CURRENT USE OF INSULIN: ICD-10-CM

## 2023-09-29 RX ORDER — ATORVASTATIN CALCIUM 20 MG/1
TABLET, FILM COATED ORAL
Qty: 90 TABLET | Refills: 0 | Status: SHIPPED | OUTPATIENT
Start: 2023-09-29

## 2023-10-05 DIAGNOSIS — E11.42 TYPE 2 DIABETES MELLITUS WITH DIABETIC POLYNEUROPATHY, WITHOUT LONG-TERM CURRENT USE OF INSULIN: ICD-10-CM

## 2023-10-18 ENCOUNTER — OFFICE VISIT (OUTPATIENT)
Dept: FAMILY MEDICINE CLINIC | Facility: CLINIC | Age: 53
End: 2023-10-18
Payer: COMMERCIAL

## 2023-10-18 VITALS
OXYGEN SATURATION: 94 % | BODY MASS INDEX: 38.36 KG/M2 | RESPIRATION RATE: 18 BRPM | SYSTOLIC BLOOD PRESSURE: 122 MMHG | DIASTOLIC BLOOD PRESSURE: 76 MMHG | WEIGHT: 315 LBS | HEIGHT: 76 IN | HEART RATE: 90 BPM

## 2023-10-18 DIAGNOSIS — I89.0 LYMPHEDEMA: ICD-10-CM

## 2023-10-18 DIAGNOSIS — E78.1 HYPERTRIGLYCERIDEMIA: ICD-10-CM

## 2023-10-18 DIAGNOSIS — Z23 NEED FOR PNEUMOCOCCAL 20-VALENT CONJUGATE VACCINATION: ICD-10-CM

## 2023-10-18 DIAGNOSIS — E66.01 MORBID OBESITY DUE TO EXCESS CALORIES: ICD-10-CM

## 2023-10-18 DIAGNOSIS — E55.9 VITAMIN D DEFICIENCY: ICD-10-CM

## 2023-10-18 DIAGNOSIS — N40.0 BENIGN PROSTATIC HYPERPLASIA WITHOUT LOWER URINARY TRACT SYMPTOMS: ICD-10-CM

## 2023-10-18 DIAGNOSIS — E11.42 TYPE 2 DIABETES MELLITUS WITH DIABETIC POLYNEUROPATHY, WITHOUT LONG-TERM CURRENT USE OF INSULIN: Primary | ICD-10-CM

## 2023-10-18 DIAGNOSIS — E29.1 HYPOGONADISM IN MALE: ICD-10-CM

## 2023-10-18 DIAGNOSIS — Z79.899 DRUG THERAPY: ICD-10-CM

## 2023-10-18 DIAGNOSIS — E11.9 ENCOUNTER FOR DIABETIC FOOT EXAM: ICD-10-CM

## 2023-10-18 DIAGNOSIS — Z23 FLU VACCINE NEED: ICD-10-CM

## 2023-10-18 DIAGNOSIS — E11.42 DIABETIC PERIPHERAL NEUROPATHY: ICD-10-CM

## 2023-10-18 DIAGNOSIS — M1A.09X0 IDIOPATHIC CHRONIC GOUT OF MULTIPLE SITES WITHOUT TOPHUS: ICD-10-CM

## 2023-10-18 DIAGNOSIS — I10 BENIGN ESSENTIAL HYPERTENSION: ICD-10-CM

## 2023-10-18 NOTE — PROGRESS NOTES
Subjective   Marcial Desai is a 53 y.o. male. Presents today for   Chief Complaint   Patient presents with    Diabetes     Discuss meds    Hypogonadism    Gout    Hyperlipidemia    Hypertension       Diabetes  He presents for his follow-up diabetic visit. He has type 2 diabetes mellitus. His disease course has been stable. Associated symptoms include foot paresthesias. Pertinent negatives for diabetes include no chest pain. Symptoms are stable.   Gout  This is a chronic problem. The problem occurs constantly. Pertinent negatives include no chest pain. Treatments tried: on suppression tehrapy, due labs.   Hyperlipidemia  This is a chronic problem. The problem is controlled. Recent lipid tests were reviewed and are normal. Exacerbating diseases include diabetes and obesity. Pertinent negatives include no chest pain or shortness of breath. The current treatment provides moderate improvement of lipids.   Hypertension  This is a chronic problem. The current episode started more than 1 year ago. The problem is unchanged. The problem is controlled. Pertinent negatives include no chest pain or shortness of breath. The current treatment provides moderate improvement.       Review of Systems   Respiratory:  Negative for shortness of breath.    Cardiovascular:  Negative for chest pain.   Musculoskeletal:  Positive for gout.       Patient Active Problem List   Diagnosis    Ankle pain    Atopic dermatitis    Benign essential hypertension    Type 2 diabetes mellitus with diabetic polyneuropathy, without long-term current use of insulin    Diabetic peripheral neuropathy    Abnormal liver function tests    Idiopathic chronic gout of multiple sites without tophus    Hypertriglyceridemia    Cramps of lower extremity    Edema of lower extremity    Lymphedema    Osteoarthritis    Morbid obesity due to excess calories    Hypogonadism in male    Ganglion cyst - left clavicle       Social History     Socioeconomic History    Marital status:     Tobacco Use    Smoking status: Never    Smokeless tobacco: Never   Vaping Use    Vaping Use: Never used   Substance and Sexual Activity    Alcohol use: No    Drug use: No    Sexual activity: Defer       Allergies   Allergen Reactions    Cortisone Shortness Of Breath       Current Outpatient Medications on File Prior to Visit   Medication Sig Dispense Refill    ACCU-CHEK TYREL PLUS test strip TEST DAILY 50 each 2    allopurinol (ZYLOPRIM) 100 MG tablet Take 2 tablets by mouth 2 (Two) Times a Day. PT TO TAKE 400MG DAILY 360 tablet 3    ammonium lactate (Lac-Hydrin) 12 % cream Apply  topically to the appropriate area as directed As Needed for Dry Skin (venosu stasis dermatitis). 385 g 5    atorvastatin (LIPITOR) 20 MG tablet TAKE 1 TABLET BY MOUTH AT BEDTIME 90 tablet 0    Blood Glucose Monitoring Suppl (ACCU-CHEK TYREL PLUS) w/Device kit       buPROPion XL (WELLBUTRIN XL) 150 MG 24 hr tablet TAKE ONE TABLET BY MOUTH DAILY 90 tablet 3    cetirizine (zyrTEC) 10 MG tablet TAKE ONE TABLET BY MOUTH DAILY 90 tablet 3    cyclobenzaprine (FLEXERIL) 10 MG tablet Take 1 tablet by mouth 3 (Three) Times a Day As Needed for Muscle Spasms. 90 tablet 5    empagliflozin (Jardiance) 10 MG tablet tablet Take 1 tablet by mouth Daily.      fluticasone (FLONASE) 50 MCG/ACT nasal spray 2 sprays by Each Nare route Daily. 48 g 3    furosemide (LASIX) 40 MG tablet TAKE ONE TABLET BY MOUTH DAILY 90 tablet 3    gabapentin (NEURONTIN) 400 MG capsule TAKE ONE CAPSULE BY MOUTH THREE TIMES A  capsule 0    glucose blood test strip Check once daily E11.9 non-iddm;  [facility];  Also disp alcohol swabs, lancets. 50 each 12    glucose monitor monitoring kit Check once daily d x E11.9 non-iddm 1 each 0    hydrocortisone 2.5 % cream Apply  topically 2 (two) times a day. Wipe bottom twice daily 28 g 0    ibuprofen (ADVIL,MOTRIN) 800 MG tablet Take 1 tablet by mouth Every 8 (Eight) Hours As Needed for Mild Pain. 90 tablet  5    indomethacin (INDOCIN) 25 MG capsule 1 po tid x 5 days, then 1 po bid x 5 days, 1 po qd x 5 days then stop 30 capsule 5    Lancets misc 1 Device Daily. Dispense brand covered by insurance. Dx. e11.9 100 each 12    lisinopril (PRINIVIL,ZESTRIL) 10 MG tablet Take 1 tablet by mouth Daily. 90 tablet 3    magnesium oxide (MAGOX) 400 (241.3 Mg) MG tablet tablet TAKE ONE TABLET BY MOUTH DAILY 30 tablet 9    metFORMIN (GLUCOPHAGE) 1000 MG tablet TAKE 1 AND 1/2 TABLETS BY MOUTH IN THE MORNING and 1 tablet in the evening 235 tablet 0    mupirocin (BACTROBAN) 2 % ointment Apply  topically to the appropriate area as directed 2 (Two) Times a Day. to affected area(s) 30 each 5    potassium chloride (K-DUR,KLOR-CON) 10 MEQ CR tablet TAKE THREE TABLETS BY MOUTH TWICE A DAY 54 tablet 0    sildenafil (VIAGRA) 100 MG tablet 1/2 to 1 po daily prn ED 10 tablet 5    Testosterone 20.25 MG/ACT (1.62%) gel APPLY 6 PUMPS DAILY TO EACH SHOULDERS OR INNER THIGHS 175 g 0    tiZANidine (ZANAFLEX) 4 MG tablet Take 1 tablet by mouth Every 8 (Eight) Hours As Needed for Muscle Spasms. 90 tablet 5    triamcinolone (KENALOG) 0.1 % cream Apply  topically to the appropriate area as directed 2 (Two) Times a Day. 30 g 2    urea (CARMOL) 40 % cream APPLY TO AFFECTED AREA(S) ON SECOND TOE DAILY AS DIRECTED 28.35 each 5    vitamin D3 125 MCG (5000 UT) capsule capsule Take 1 capsule by mouth Daily. 30 capsule 6    [DISCONTINUED] dapagliflozin Propanediol (Farxiga) 10 MG tablet Take 10 mg by mouth Daily. Indications: Type 2 Diabetes (Patient not taking: Reported on 10/18/2023) 30 tablet 5    [DISCONTINUED] Semaglutide (Rybelsus) 3 MG tablet Take 1 tablet by mouth Daily. Indications: Type 2 Diabetes (Patient not taking: Reported on 10/18/2023) 30 tablet 0    [DISCONTINUED] Semaglutide (Rybelsus) 7 MG tablet Take 7 mg by mouth Daily. Indications: Type 2 Diabetes (Patient not taking: Reported on 10/18/2023) 30 tablet 5     Current Facility-Administered  "Medications on File Prior to Visit   Medication Dose Route Frequency Provider Last Rate Last Admin    Testosterone Cypionate (DEPOTESTOTERONE CYPIONATE) injection 100 mg  100 mg Intramuscular Q14 Days Tesfaye Swartz, DO   100 mg at 06/25/19 1220       Objective   Vitals:    10/18/23 0859   BP: 122/76   Pulse: 90   Resp: 18   SpO2: 94%   Weight: (!) 159 kg (350 lb)   Height: 193 cm (75.98\")   PainSc: 0-No pain     Body mass index is 42.62 kg/m².    Physical Exam  Vitals and nursing note reviewed.   Constitutional:       Appearance: He is well-developed.   Musculoskeletal:      Cervical back: Neck supple.   Feet:      Comments: Diabetic foot exam and monofilament completed, see scanned report.        Skin:     General: Skin is warm and dry.   Neurological:      Mental Status: He is alert.   Psychiatric:         Behavior: Behavior normal.         Assessment & Plan   Diagnoses and all orders for this visit:    1. Type 2 diabetes mellitus with diabetic polyneuropathy, without long-term current use of insulin (Primary)  -     Microalbumin / Creatinine Urine Ratio - Urine, Clean Catch  -     Lipid Panel  -     Comprehensive Metabolic Panel  -     Hemoglobin A1c  -     TSH    2. Diabetic peripheral neuropathy    3. Lymphedema    4. Morbid obesity due to excess calories    5. Hypogonadism in male  -     Comprehensive Metabolic Panel  -     CBC (No Diff)  -     Testosterone  -     PSA DIAGNOSTIC    6. Benign essential hypertension  -     Comprehensive Metabolic Panel    7. Hypertriglyceridemia  -     Lipid Panel  -     Comprehensive Metabolic Panel    8. Idiopathic chronic gout of multiple sites without tophus  -     Comprehensive Metabolic Panel  -     CBC (No Diff)  -     Uric Acid    9. Vitamin D deficiency  -     Vitamin D,25-Hydroxy    10. Drug therapy  -     CBC (No Diff)  -     Uric Acid    11. Benign prostatic hyperplasia without lower urinary tract symptoms  -     PSA DIAGNOSTIC      $3500 deductible on jardiance " and trulicity cannot afford, so off;   gave applicatiosn for patient assistance and samples of ozempic and jardiance until can get covered with patient assistance hopefully  -dm2 - continue medications, recheck labs  -hypertension - controlled, continue medications  -HLD - continue statin, recheck lipids.  -chck labs for gout  -I explained at length to patient risks of replacement that can include acne, mood changes, cardiovascular disease, polycythemia and liver dysfunction.  I also warned about concerns regarding prostate and breast cancer.   I discussed with potential for improvement in fatigue and preventing osteoporosis.  I discussed will need to monitor labs regularly.  -neuropathy - work on diet and exercise          Class 3 Severe Obesity (BMI >=40). Obesity-related health conditions include the following: hypertension, diabetes mellitus, and dyslipidemias. Obesity is unchanged. BMI is is above average; BMI management plan is completed. We discussed portion control and increasing exercise.       -Follow up: 6 months and prn

## 2023-10-18 NOTE — PATIENT INSTRUCTIONS
Calorie Counting for Weight Loss  Calories are units of energy. Your body needs a certain number of calories from food to keep going throughout the day. When you eat or drink more calories than your body needs, your body stores the extra calories mostly as fat. When you eat or drink fewer calories than your body needs, your body burns fat to get the energy it needs.  Calorie counting means keeping track of how many calories you eat and drink each day. Calorie counting can be helpful if you need to lose weight. If you eat fewer calories than your body needs, you should lose weight. Ask your health care provider what a healthy weight is for you.  For calorie counting to work, you will need to eat the right number of calories each day to lose a healthy amount of weight per week. A dietitian can help you figure out how many calories you need in a day and will suggest ways to reach your calorie goal.  A healthy amount of weight to lose each week is usually 1-2 lb (0.5-0.9 kg). This usually means that your daily calorie intake should be reduced by 500-750 calories.  Eating 1,200-1,500 calories a day can help most women lose weight.  Eating 1,500-1,800 calories a day can help most men lose weight.  What do I need to know about calorie counting?  Work with your health care provider or dietitian to determine how many calories you should get each day. To meet your daily calorie goal, you will need to:  Find out how many calories are in each food that you would like to eat. Try to do this before you eat.  Decide how much of the food you plan to eat.  Keep a food log. Do this by writing down what you ate and how many calories it had.  To successfully lose weight, it is important to balance calorie counting with a healthy lifestyle that includes regular activity.  Where do I find calorie information?  The number of calories in a food can be found on a Nutrition Facts label. If a food does not have a Nutrition Facts label, try to  look up the calories online or ask your dietitian for help.  Remember that calories are listed per serving. If you choose to have more than one serving of a food, you will have to multiply the calories per serving by the number of servings you plan to eat. For example, the label on a package of bread might say that a serving size is 1 slice and that there are 90 calories in a serving. If you eat 1 slice, you will have eaten 90 calories. If you eat 2 slices, you will have eaten 180 calories.  How do I keep a food log?  After each time that you eat, record the following in your food log as soon as possible:  What you ate. Be sure to include toppings, sauces, and other extras on the food.  How much you ate. This can be measured in cups, ounces, or number of items.  How many calories were in each food and drink.  The total number of calories in the food you ate.  Keep your food log near you, such as in a pocket-sized notebook or on an dania or website on your mobile phone. Some programs will calculate calories for you and show you how many calories you have left to meet your daily goal.  What are some portion-control tips?  Know how many calories are in a serving. This will help you know how many servings you can have of a certain food.  Use a measuring cup to measure serving sizes. You could also try weighing out portions on a kitchen scale. With time, you will be able to estimate serving sizes for some foods.  Take time to put servings of different foods on your favorite plates or in your favorite bowls and cups so you know what a serving looks like.  Try not to eat straight from a food's packaging, such as from a bag or box. Eating straight from the package makes it hard to see how much you are eating and can lead to overeating. Put the amount you would like to eat in a cup or on a plate to make sure you are eating the right portion.  Use smaller plates, glasses, and bowls for smaller portions and to prevent  overeating.  Try not to multitask. For example, avoid watching TV or using your computer while eating. If it is time to eat, sit down at a table and enjoy your food. This will help you recognize when you are full. It will also help you be more mindful of what and how much you are eating.  What are tips for following this plan?  Reading food labels  Check the calorie count compared with the serving size. The serving size may be smaller than what you are used to eating.  Check the source of the calories. Try to choose foods that are high in protein, fiber, and vitamins, and low in saturated fat, trans fat, and sodium.  Shopping  Read nutrition labels while you shop. This will help you make healthy decisions about which foods to buy.  Pay attention to nutrition labels for low-fat or fat-free foods. These foods sometimes have the same number of calories or more calories than the full-fat versions. They also often have added sugar, starch, or salt to make up for flavor that was removed with the fat.  Make a grocery list of lower-calorie foods and stick to it.  Cooking  Try to cook your favorite foods in a healthier way. For example, try baking instead of frying.  Use low-fat dairy products.  Meal planning  Use more fruits and vegetables. One-half of your plate should be fruits and vegetables.  Include lean proteins, such as chicken, turkey, and fish.  Lifestyle  Each week, aim to do one of the followin minutes of moderate exercise, such as walking.  75 minutes of vigorous exercise, such as running.  General information  Know how many calories are in the foods you eat most often. This will help you calculate calorie counts faster.  Find a way of tracking calories that works for you. Get creative. Try different apps or programs if writing down calories does not work for you.  What foods should I eat?    Eat nutritious foods. It is better to have a nutritious, high-calorie food, such as an avocado, than a food with  few nutrients, such as a bag of potato chips.  Use your calories on foods and drinks that will fill you up and will not leave you hungry soon after eating.  Examples of foods that fill you up are nuts and nut butters, vegetables, lean proteins, and high-fiber foods such as whole grains. High-fiber foods are foods with more than 5 g of fiber per serving.  Pay attention to calories in drinks. Low-calorie drinks include water and unsweetened drinks.  The items listed above may not be a complete list of foods and beverages you can eat. Contact a dietitian for more information.  What foods should I limit?  Limit foods or drinks that are not good sources of vitamins, minerals, or protein or that are high in unhealthy fats. These include:  Candy.  Other sweets.  Sodas, specialty coffee drinks, alcohol, and juice.  The items listed above may not be a complete list of foods and beverages you should avoid. Contact a dietitian for more information.  How do I count calories when eating out?  Pay attention to portions. Often, portions are much larger when eating out. Try these tips to keep portions smaller:  Consider sharing a meal instead of getting your own.  If you get your own meal, eat only half of it. Before you start eating, ask for a container and put half of your meal into it.  When available, consider ordering smaller portions from the menu instead of full portions.  Pay attention to your food and drink choices. Knowing the way food is cooked and what is included with the meal can help you eat fewer calories.  If calories are listed on the menu, choose the lower-calorie options.  Choose dishes that include vegetables, fruits, whole grains, low-fat dairy products, and lean proteins.  Choose items that are boiled, broiled, grilled, or steamed. Avoid items that are buttered, battered, fried, or served with cream sauce. Items labeled as crispy are usually fried, unless stated otherwise.  Choose water, low-fat milk,  unsweetened iced tea, or other drinks without added sugar. If you want an alcoholic beverage, choose a lower-calorie option, such as a glass of wine or light beer.  Ask for dressings, sauces, and syrups on the side. These are usually high in calories, so you should limit the amount you eat.  If you want a salad, choose a garden salad and ask for grilled meats. Avoid extra toppings such as roque, cheese, or fried items. Ask for the dressing on the side, or ask for olive oil and vinegar or lemon to use as dressing.  Estimate how many servings of a food you are given. Knowing serving sizes will help you be aware of how much food you are eating at restaurants.  Where to find more information  Centers for Disease Control and Prevention: www.cdc.gov  [place] Department of Agriculture: myplate.gov  Summary  Calorie counting means keeping track of how many calories you eat and drink each day. If you eat fewer calories than your body needs, you should lose weight.  A healthy amount of weight to lose per week is usually 1-2 lb (0.5-0.9 kg). This usually means reducing your daily calorie intake by 500-750 calories.  The number of calories in a food can be found on a Nutrition Facts label. If a food does not have a Nutrition Facts label, try to look up the calories online or ask your dietitian for help.  Use smaller plates, glasses, and bowls for smaller portions and to prevent overeating.  Use your calories on foods and drinks that will fill you up and not leave you hungry shortly after a meal.  This information is not intended to replace advice given to you by your health care provider. Make sure you discuss any questions you have with your health care provider.  Document Revised: 01/28/2021 Document Reviewed: 01/28/2021  Elsevier Patient Education © 2022 [employer]    Exercising to Lose Weight  Getting regular exercise is important for everyone. It is especially important if you are overweight. Being overweight increases  your risk of heart disease, stroke, diabetes, high blood pressure, and several types of cancer. Exercising, and reducing the calories you consume, can help you lose weight and improve fitness and health.  Exercise can be moderate or vigorous intensity. To lose weight, most people need to do a certain amount of moderate or vigorous-intensity exercise each week.  How can exercise affect me?  You lose weight when you exercise enough to burn more calories than you eat. Exercise also reduces body fat and builds muscle. The more muscle you have, the more calories you burn. Exercise also:  Improves mood.  Reduces stress and tension.  Improves your overall fitness, flexibility, and endurance.  Increases bone strength.  Moderate-intensity exercise  Moderate-intensity exercise is any activity that gets you moving enough to burn at least three times more energy (calories) than if you were sitting.  Examples of moderate exercise include:  Walking a mile in 15 minutes.  Doing light yard work.  Biking at an easy pace.  Most people should get at least 150 minutes of moderate-intensity exercise a week to maintain their body weight.  Vigorous-intensity exercise  Vigorous-intensity exercise is any activity that gets you moving enough to burn at least six times more calories than if you were sitting. When you exercise at this intensity, you should be working hard enough that you are not able to carry on a conversation.  Examples of vigorous exercise include:  Running.  Playing a team sport, such as football, basketball, and soccer.  Jumping rope.  Most people should get at least 75 minutes a week of vigorous exercise to maintain their body weight.  What actions can I take to lose weight?  The amount of exercise you need to lose weight depends on:  Your age.  The type of exercise.  Any health conditions you have.  Your overall physical ability.  Talk to your health care provider about how much exercise you need and what types of  activities are safe for you.  Nutrition    Make changes to your diet as told by your health care provider or diet and nutrition specialist (dietitian). This may include:  Eating fewer calories.  Eating more protein.  Eating less unhealthy fats.  Eating a diet that includes fresh fruits and vegetables, whole grains, low-fat dairy products, and lean protein.  Avoiding foods with added fat, salt, and sugar.  Drink plenty of water while you exercise to prevent dehydration or heat stroke.  Activity  Choose an activity that you enjoy and set realistic goals. Your health care provider can help you make an exercise plan that works for you.  Exercise at a moderate or vigorous intensity most days of the week.  The intensity of exercise may vary from person to person. You can tell how intense a workout is for you by paying attention to your breathing and heartbeat. Most people will notice their breathing and heartbeat get faster with more intense exercise.  Do resistance training twice each week, such as:  Push-ups.  Sit-ups.  Lifting weights.  Using resistance bands.  Getting short amounts of exercise can be just as helpful as long, structured periods of exercise. If you have trouble finding time to exercise, try doing these things as part of your daily routine:  Get up, stretch, and walk around every 30 minutes throughout the day.  Go for a walk during your lunch break.  Park your car farther away from your destination.  If you take public transportation, get off one stop early and walk the rest of the way.  Make phone calls while standing up and walking around.  Take the stairs instead of elevators or escalators.  Wear comfortable clothes and shoes with good support.  Do not exercise so much that you hurt yourself, feel dizzy, or get very short of breath.  Where to find more information  [place] Department of Health and Human Services: www.hhs.gov  Centers for Disease Control and Prevention: www.cdc.gov  Contact a health care  provider:  Before starting a new exercise program.  If you have questions or concerns about your weight.  If you have a medical problem that keeps you from exercising.  Get help right away if:  You have any of the following while exercising:  Injury.  Dizziness.  Difficulty breathing or shortness of breath that does not go away when you stop exercising.  Chest pain.  Rapid heartbeat.  These symptoms may represent a serious problem that is an emergency. Do not wait to see if the symptoms will go away. Get medical help right away. Call your local emergency services (911 in the [place] Do not drive yourself to the hospital.  Summary  Getting regular exercise is especially important if you are overweight.  Being overweight increases your risk of heart disease, stroke, diabetes, high blood pressure, and several types of cancer.  Losing weight happens when you burn more calories than you eat.  Reducing the amount of calories you eat, and getting regular moderate or vigorous exercise each week, helps you lose weight.  This information is not intended to replace advice given to you by your health care provider. Make sure you discuss any questions you have with your health care provider.  Document Revised: 02/13/2022 Document Reviewed: 02/13/2022  Elsevier Patient Education © 2022 [employer]

## 2023-10-19 LAB
25(OH)D3+25(OH)D2 SERPL-MCNC: 24.6 NG/ML (ref 30–100)
ALBUMIN SERPL-MCNC: 4.8 G/DL (ref 3.5–5.2)
ALBUMIN/CREAT UR: <4 MG/G CREAT (ref 0–29)
ALBUMIN/GLOB SERPL: 2 G/DL
ALP SERPL-CCNC: 89 U/L (ref 39–117)
ALT SERPL-CCNC: 52 U/L (ref 1–41)
AST SERPL-CCNC: 52 U/L (ref 1–40)
BILIRUB SERPL-MCNC: 1 MG/DL (ref 0–1.2)
BUN SERPL-MCNC: 12 MG/DL (ref 6–20)
BUN/CREAT SERPL: 13 (ref 7–25)
CALCIUM SERPL-MCNC: 9.6 MG/DL (ref 8.6–10.5)
CHLORIDE SERPL-SCNC: 97 MMOL/L (ref 98–107)
CHOLEST SERPL-MCNC: 117 MG/DL (ref 0–200)
CO2 SERPL-SCNC: 26.5 MMOL/L (ref 22–29)
CREAT SERPL-MCNC: 0.92 MG/DL (ref 0.76–1.27)
CREAT UR-MCNC: 71 MG/DL
EGFRCR SERPLBLD CKD-EPI 2021: 100.1 ML/MIN/1.73
ERYTHROCYTE [DISTWIDTH] IN BLOOD BY AUTOMATED COUNT: 13.6 % (ref 12.3–15.4)
GLOBULIN SER CALC-MCNC: 2.4 GM/DL
GLUCOSE SERPL-MCNC: 174 MG/DL (ref 65–99)
HBA1C MFR BLD: 8 % (ref 4.8–5.6)
HCT VFR BLD AUTO: 44.3 % (ref 37.5–51)
HDLC SERPL-MCNC: 30 MG/DL (ref 40–60)
HGB BLD-MCNC: 14.9 G/DL (ref 13–17.7)
LDLC SERPL CALC-MCNC: 46 MG/DL (ref 0–100)
MCH RBC QN AUTO: 29.4 PG (ref 26.6–33)
MCHC RBC AUTO-ENTMCNC: 33.6 G/DL (ref 31.5–35.7)
MCV RBC AUTO: 87.4 FL (ref 79–97)
MICROALBUMIN UR-MCNC: <3 UG/ML
PLATELET # BLD AUTO: 148 10*3/MM3 (ref 140–450)
POTASSIUM SERPL-SCNC: 4 MMOL/L (ref 3.5–5.2)
PROT SERPL-MCNC: 7.2 G/DL (ref 6–8.5)
PSA SERPL-MCNC: 0.45 NG/ML (ref 0–4)
RBC # BLD AUTO: 5.07 10*6/MM3 (ref 4.14–5.8)
SODIUM SERPL-SCNC: 137 MMOL/L (ref 136–145)
TESTOST SERPL-MCNC: 440 NG/DL (ref 264–916)
TRIGL SERPL-MCNC: 263 MG/DL (ref 0–150)
TSH SERPL DL<=0.005 MIU/L-ACNC: 1.87 UIU/ML (ref 0.27–4.2)
URATE SERPL-MCNC: 7 MG/DL (ref 3.4–7)
VLDLC SERPL CALC-MCNC: 41 MG/DL (ref 5–40)
WBC # BLD AUTO: 5.24 10*3/MM3 (ref 3.4–10.8)

## 2023-10-23 NOTE — PROGRESS NOTES
Call results to patient.  Cholesterol well controlled  A1c up some, recommend apply for assistance for trulicity and jardiance, let me know if approved.  Vitamin D low again, still taking vitamin D?    Liver enzyme elevation stable

## 2023-11-13 ENCOUNTER — OFFICE VISIT (OUTPATIENT)
Dept: FAMILY MEDICINE CLINIC | Facility: CLINIC | Age: 53
End: 2023-11-13
Payer: MEDICARE

## 2023-11-13 VITALS
TEMPERATURE: 96.6 F | HEIGHT: 76 IN | BODY MASS INDEX: 38.36 KG/M2 | DIASTOLIC BLOOD PRESSURE: 64 MMHG | HEART RATE: 72 BPM | SYSTOLIC BLOOD PRESSURE: 114 MMHG | WEIGHT: 315 LBS | OXYGEN SATURATION: 96 %

## 2023-11-13 DIAGNOSIS — R05.1 ACUTE COUGH: ICD-10-CM

## 2023-11-13 DIAGNOSIS — L03.115 CELLULITIS OF RIGHT LEG: Primary | ICD-10-CM

## 2023-11-13 PROCEDURE — 99214 OFFICE O/P EST MOD 30 MIN: CPT | Performed by: NURSE PRACTITIONER

## 2023-11-13 RX ORDER — BENZONATATE 100 MG/1
100 CAPSULE ORAL
Qty: 10 CAPSULE | Refills: 0 | Status: SHIPPED | OUTPATIENT
Start: 2023-11-13 | End: 2023-11-23

## 2023-11-13 RX ORDER — CEPHALEXIN 500 MG/1
500 CAPSULE ORAL 3 TIMES DAILY
Qty: 21 CAPSULE | Refills: 0 | Status: SHIPPED | OUTPATIENT
Start: 2023-11-13 | End: 2023-11-20

## 2023-11-13 NOTE — PROGRESS NOTES
Subjective   Marcial Desai is a 53 y.o. male. Presents today for   Chief Complaint   Patient presents with    Rash     Rt lower leg Redness and Warm to touch X's 2 days       History Of Present Illness  He has a cellulitis on his right lower extremity that began yesterday - he ran fever on Saturday and Sunday the fever broke.  Last evening he noticed the red rash circumfrentially around his lower leg that is  2.5 cm wide.  He also has an acute cough that has been present x 2 weeks without other symptoms.      Patient Active Problem List   Diagnosis    Ankle pain    Atopic dermatitis    Benign essential hypertension    Type 2 diabetes mellitus with diabetic polyneuropathy, without long-term current use of insulin    Diabetic peripheral neuropathy    Abnormal liver function tests    Idiopathic chronic gout of multiple sites without tophus    Hypertriglyceridemia    Cramps of lower extremity    Edema of lower extremity    Lymphedema    Osteoarthritis    Morbid obesity due to excess calories    Hypogonadism in male    Ganglion cyst - left clavicle       Social History     Socioeconomic History    Marital status:    Tobacco Use    Smoking status: Never    Smokeless tobacco: Never   Vaping Use    Vaping Use: Never used   Substance and Sexual Activity    Alcohol use: No    Drug use: No    Sexual activity: Defer       Allergies   Allergen Reactions    Cortisone Shortness Of Breath       Current Outpatient Medications on File Prior to Visit   Medication Sig Dispense Refill    ACCU-CHEK TYREL PLUS test strip TEST DAILY 50 each 2    allopurinol (ZYLOPRIM) 100 MG tablet Take 2 tablets by mouth 2 (Two) Times a Day. PT TO TAKE 400MG DAILY 360 tablet 3    ammonium lactate (Lac-Hydrin) 12 % cream Apply  topically to the appropriate area as directed As Needed for Dry Skin (venosu stasis dermatitis). 385 g 5    atorvastatin (LIPITOR) 20 MG tablet TAKE 1 TABLET BY MOUTH AT BEDTIME 90 tablet 0    Blood Glucose Monitoring Suppl  (ACCU-CHEK TYREL PLUS) w/Device kit       buPROPion XL (WELLBUTRIN XL) 150 MG 24 hr tablet TAKE ONE TABLET BY MOUTH DAILY 90 tablet 3    cetirizine (zyrTEC) 10 MG tablet TAKE ONE TABLET BY MOUTH DAILY 90 tablet 3    cyclobenzaprine (FLEXERIL) 10 MG tablet Take 1 tablet by mouth 3 (Three) Times a Day As Needed for Muscle Spasms. 90 tablet 5    empagliflozin (Jardiance) 10 MG tablet tablet Take 1 tablet by mouth Daily.      fluticasone (FLONASE) 50 MCG/ACT nasal spray 2 sprays by Each Nare route Daily. 48 g 3    furosemide (LASIX) 40 MG tablet TAKE ONE TABLET BY MOUTH DAILY 90 tablet 3    gabapentin (NEURONTIN) 400 MG capsule TAKE ONE CAPSULE BY MOUTH THREE TIMES A  capsule 0    glucose blood test strip Check once daily E11.9 non-iddm;  States [employer] brand covered;  Also disp alcohol swabs, lancets. 50 each 12    glucose monitor monitoring kit Check once daily d x E11.9 non-iddm 1 each 0    hydrocortisone 2.5 % cream Apply  topically 2 (two) times a day. Wipe bottom twice daily 28 g 0    ibuprofen (ADVIL,MOTRIN) 800 MG tablet Take 1 tablet by mouth Every 8 (Eight) Hours As Needed for Mild Pain. 90 tablet 5    indomethacin (INDOCIN) 25 MG capsule 1 po tid x 5 days, then 1 po bid x 5 days, 1 po qd x 5 days then stop 30 capsule 5    Lancets misc 1 Device Daily. Dispense brand covered by insurance. Dx. e11.9 100 each 12    lisinopril (PRINIVIL,ZESTRIL) 10 MG tablet Take 1 tablet by mouth Daily. 90 tablet 3    magnesium oxide (MAGOX) 400 (241.3 Mg) MG tablet tablet TAKE ONE TABLET BY MOUTH DAILY 30 tablet 9    metFORMIN (GLUCOPHAGE) 1000 MG tablet TAKE 1 AND 1/2 TABLETS BY MOUTH IN THE MORNING and 1 tablet in the evening 235 tablet 0    mupirocin (BACTROBAN) 2 % ointment Apply  topically to the appropriate area as directed 2 (Two) Times a Day. to affected area(s) 30 each 5    potassium chloride (K-DUR,KLOR-CON) 10 MEQ CR tablet TAKE THREE TABLETS BY MOUTH TWICE A DAY 54 tablet 0    sildenafil (VIAGRA) 100 MG tablet  "1/2 to 1 po daily prn ED 10 tablet 5    Testosterone 20.25 MG/ACT (1.62%) gel APPLY 6 PUMPS DAILY TO EACH SHOULDERS OR INNER THIGHS 175 g 0    tiZANidine (ZANAFLEX) 4 MG tablet Take 1 tablet by mouth Every 8 (Eight) Hours As Needed for Muscle Spasms. 90 tablet 5    triamcinolone (KENALOG) 0.1 % cream Apply  topically to the appropriate area as directed 2 (Two) Times a Day. 30 g 2    urea (CARMOL) 40 % cream APPLY TO AFFECTED AREA(S) ON SECOND TOE DAILY AS DIRECTED 28.35 each 5    vitamin D3 125 MCG (5000 UT) capsule capsule Take 1 capsule by mouth Daily. 30 capsule 6     Current Facility-Administered Medications on File Prior to Visit   Medication Dose Route Frequency Provider Last Rate Last Admin    Testosterone Cypionate (DEPOTESTOTERONE CYPIONATE) injection 100 mg  100 mg Intramuscular Q14 Days Tesfaye Swartz DO   100 mg at 06/25/19 1220       Objective   Vitals:    11/13/23 0912   BP: 114/64   BP Location: Right arm   Patient Position: Sitting   Cuff Size: Pediatric   Pulse: 72   Temp: 96.6 °F (35.9 °C)   SpO2: 96%   Weight: (!) 157 kg (346 lb 3.2 oz)   Height: 193 cm (75.98\")     Body mass index is 42.16 kg/m².  Discussed weight and BMI.  Encouraged healthy diet and regular exercise.      Physical Exam  Constitutional:       Appearance: He is obese.   HENT:      Head: Normocephalic and atraumatic.      Mouth/Throat:      Mouth: Mucous membranes are moist.   Eyes:      Pupils: Pupils are equal, round, and reactive to light.   Cardiovascular:      Rate and Rhythm: Normal rate and regular rhythm.      Pulses: Normal pulses.      Heart sounds: Normal heart sounds.   Pulmonary:      Effort: Pulmonary effort is normal.      Breath sounds: Normal breath sounds.   Abdominal:      General: Bowel sounds are normal.   Musculoskeletal:         General: Normal range of motion.   Skin:     General: Skin is warm and dry.      Findings: Rash present.      Comments: 2.5 cm rashed area running circumfrentially around the " right lower extremity.   Neurological:      General: No focal deficit present.      Mental Status: He is alert.   Psychiatric:         Mood and Affect: Mood normal.         Procedures     Assessment & Plan   Diagnoses and all orders for this visit:    1. Cellulitis of right leg (Primary)  -     cephalexin (Keflex) 500 MG capsule; Take 1 capsule by mouth 3 (Three) Times a Day for 7 days.  Dispense: 21 capsule; Refill: 0    2. Acute cough  -     benzonatate (Tessalon Perles) 100 MG capsule; Take 1 capsule by mouth every night at bedtime for 10 days.  Dispense: 10 capsule; Refill: 0            Discussed Care Gaps, ordered referrals and encouraged vaccination updates.       - Pt agrees with plan of care and denies further questions/concerns today  - This document is intended for medical expert use only. Persons  reading this document without medical staff guidance may result in misinterpretation and unintended morbidity     Go to the ER for any possible life-threatening symptoms such as chest pain or shortness of air.      Please allow 3-5 business days for recommendations based on new results      I personally spent time with this patient, preparing for the visit, reviewing tests, obtaining and/or reviewing a separately obtained history, performing a medically appropriate examination and/or evaluation, counseling and educating the patient/family/caregiver, ordering medications,  documenting information in the medical record and indepentently interpreting results.             Return in about 23 weeks (around 4/22/2024) for Next scheduled follow up with Dr. Weber.

## 2023-11-20 ENCOUNTER — TELEPHONE (OUTPATIENT)
Dept: FAMILY MEDICINE CLINIC | Facility: CLINIC | Age: 53
End: 2023-11-20
Payer: MEDICARE

## 2023-11-21 DIAGNOSIS — L03.115 CELLULITIS OF RIGHT LEG: ICD-10-CM

## 2023-11-21 RX ORDER — CEPHALEXIN 500 MG/1
500 CAPSULE ORAL 3 TIMES DAILY
Qty: 21 CAPSULE | Refills: 0 | Status: SHIPPED | OUTPATIENT
Start: 2023-11-21 | End: 2023-11-28

## 2023-11-29 RX ORDER — LISINOPRIL 10 MG/1
10 TABLET ORAL DAILY
Qty: 90 TABLET | Refills: 1 | Status: SHIPPED | OUTPATIENT
Start: 2023-11-29

## 2024-01-16 ENCOUNTER — TELEPHONE (OUTPATIENT)
Dept: FAMILY MEDICINE CLINIC | Facility: CLINIC | Age: 54
End: 2024-01-16

## 2024-01-29 DIAGNOSIS — E29.1 HYPOGONADISM IN MALE: ICD-10-CM

## 2024-01-29 RX ORDER — TESTOSTERONE 16.2 MG/G
GEL TRANSDERMAL
Qty: 150 G | Refills: 2 | Status: SHIPPED | OUTPATIENT
Start: 2024-01-29

## 2024-01-31 DIAGNOSIS — E11.42 TYPE 2 DIABETES MELLITUS WITH DIABETIC POLYNEUROPATHY, WITHOUT LONG-TERM CURRENT USE OF INSULIN: Primary | ICD-10-CM

## 2024-01-31 LAB — HBA1C MFR BLD: 8.1 % (ref 4.8–5.9)

## 2024-02-04 NOTE — PROGRESS NOTES
Call results to patient.  A1C received from insurance.  It is above goal.  Was jardiance 10mg 1 daily covered?  If so, increase to 25mg daily, ok send #[phone] ref.

## 2024-02-08 DIAGNOSIS — E29.1 HYPOGONADISM IN MALE: ICD-10-CM

## 2024-02-08 DIAGNOSIS — R00.2 PALPITATIONS: Primary | ICD-10-CM

## 2024-02-08 RX ORDER — TESTOSTERONE 16.2 MG/G
GEL TRANSDERMAL
Qty: 150 G | Refills: 2 | Status: SHIPPED | OUTPATIENT
Start: 2024-02-08

## 2024-02-09 LAB
ALBUMIN SERPL-MCNC: 4.5 G/DL (ref 3.8–4.9)
ALBUMIN/GLOB SERPL: 1.7 {RATIO} (ref 1.2–2.2)
ALP SERPL-CCNC: 99 IU/L (ref 44–121)
ALT SERPL-CCNC: 41 IU/L (ref 0–44)
AST SERPL-CCNC: 31 IU/L (ref 0–40)
BILIRUB SERPL-MCNC: 0.7 MG/DL (ref 0–1.2)
BUN SERPL-MCNC: 15 MG/DL (ref 6–24)
BUN/CREAT SERPL: 14 (ref 9–20)
CALCIUM SERPL-MCNC: 9.5 MG/DL (ref 8.7–10.2)
CHLORIDE SERPL-SCNC: 95 MMOL/L (ref 96–106)
CO2 SERPL-SCNC: 24 MMOL/L (ref 20–29)
CREAT SERPL-MCNC: 1.07 MG/DL (ref 0.76–1.27)
EGFRCR SERPLBLD CKD-EPI 2021: 83 ML/MIN/1.73
ERYTHROCYTE [DISTWIDTH] IN BLOOD BY AUTOMATED COUNT: 14.2 % (ref 11.6–15.4)
GLOBULIN SER CALC-MCNC: 2.6 G/DL (ref 1.5–4.5)
GLUCOSE SERPL-MCNC: 262 MG/DL (ref 70–99)
HBA1C MFR BLD: 8.8 % (ref 4.8–5.6)
HCT VFR BLD AUTO: 48.7 % (ref 37.5–51)
HGB BLD-MCNC: 15.8 G/DL (ref 13–17.7)
MAGNESIUM SERPL-MCNC: 2.2 MG/DL (ref 1.6–2.3)
MCH RBC QN AUTO: 28.8 PG (ref 26.6–33)
MCHC RBC AUTO-ENTMCNC: 32.4 G/DL (ref 31.5–35.7)
MCV RBC AUTO: 89 FL (ref 79–97)
PLATELET # BLD AUTO: 161 X10E3/UL (ref 150–450)
POTASSIUM SERPL-SCNC: 3.9 MMOL/L (ref 3.5–5.2)
PROT SERPL-MCNC: 7.1 G/DL (ref 6–8.5)
RBC # BLD AUTO: 5.48 X10E6/UL (ref 4.14–5.8)
SODIUM SERPL-SCNC: 136 MMOL/L (ref 134–144)
TSH SERPL DL<=0.005 MIU/L-ACNC: 1.78 UIU/ML (ref 0.45–4.5)
WBC # BLD AUTO: 6.5 X10E3/UL (ref 3.4–10.8)

## 2024-02-19 ENCOUNTER — TELEPHONE (OUTPATIENT)
Dept: FAMILY MEDICINE CLINIC | Facility: CLINIC | Age: 54
End: 2024-02-19
Payer: MEDICARE

## 2024-02-19 RX ORDER — DULAGLUTIDE 1.5 MG/.5ML
1.5 INJECTION, SOLUTION SUBCUTANEOUS WEEKLY
Qty: 6 ML | Refills: 3 | Status: SHIPPED | OUTPATIENT
Start: 2024-02-19

## 2024-02-19 NOTE — TELEPHONE ENCOUNTER
----- Message from Shanita Mueller MA sent at 2/7/2024 11:34 AM EST -----  Per pt, he was getting samples of the jardiance 25mg daily. He now has his trulicity also. He was out of the trulicity for a while. We were giving him samples of the ozempic until his insurance changed. Please advise how to proceed. He is aware of his results

## 2024-02-19 NOTE — TELEPHONE ENCOUNTER
If has trulicity 0.75mg weekly and taking, send in 1.5mg weekly 3 months supply 3 ref and have go up on dose to this.   RRJ

## 2024-03-01 ENCOUNTER — HOSPITAL ENCOUNTER (OUTPATIENT)
Dept: CARDIOLOGY | Facility: HOSPITAL | Age: 54
Discharge: HOME | End: 2024-03-01
Payer: MEDICARE

## 2024-03-01 DIAGNOSIS — R00.2 PALPITATIONS: ICD-10-CM

## 2024-03-01 PROCEDURE — 93246 EXT ECG>7D<15D RECORDING: CPT

## 2024-03-22 ENCOUNTER — TELEPHONE (OUTPATIENT)
Dept: FAMILY MEDICINE CLINIC | Facility: CLINIC | Age: 54
End: 2024-03-22
Payer: MEDICARE

## 2024-03-22 DIAGNOSIS — E11.42 TYPE 2 DIABETES MELLITUS WITH DIABETIC POLYNEUROPATHY, WITHOUT LONG-TERM CURRENT USE OF INSULIN: Primary | ICD-10-CM

## 2024-03-22 NOTE — TELEPHONE ENCOUNTER
Caller: Marcial Desai    Relationship: Self    Best call back number: [phone]    Who are you requesting to speak with (clinical staff, provider,  specific staff member): DR MARROQUIN    What was the call regarding: PATIENT STATED THAT HE HAD BEEN WAITING A MONTH AND A HALF TO GET TRULICITY FROM HIS PHARMACY BUT HE JUST RECEIVED A MESSAGE SAYING THAT THEY  HAS IT ON BACK ORDERS. PATIENT IS WANTING TO KNOW WHAT DR MARROQUIN WOULD LIKE TO DO OR IF HE WOULD WANT HIM TO GO BACK ON [facility].PLEASE ADVISE.    PREFERRED PHARMACY:[facility] [id number] - [facility] [address] [place] [address] AT [facility] - [phone]  - [phone]  [phone]

## 2024-03-24 DIAGNOSIS — E78.1 HYPERTRIGLYCERIDEMIA: ICD-10-CM

## 2024-03-24 DIAGNOSIS — E11.42 TYPE 2 DIABETES MELLITUS WITH DIABETIC POLYNEUROPATHY, WITHOUT LONG-TERM CURRENT USE OF INSULIN: ICD-10-CM

## 2024-03-24 RX ORDER — BISOPROLOL FUMARATE 5 MG/1
5 TABLET, FILM COATED ORAL DAILY
Qty: 90 TABLET | Refills: 1 | Status: SHIPPED | OUTPATIENT
Start: 2024-03-24

## 2024-03-24 RX ORDER — ATORVASTATIN CALCIUM 20 MG/1
TABLET, FILM COATED ORAL
Qty: 90 TABLET | Refills: 0 | Status: SHIPPED | OUTPATIENT
Start: 2024-03-24

## 2024-04-04 DIAGNOSIS — E78.1 HYPERTRIGLYCERIDEMIA: ICD-10-CM

## 2024-04-04 DIAGNOSIS — E11.42 TYPE 2 DIABETES MELLITUS WITH DIABETIC POLYNEUROPATHY, WITHOUT LONG-TERM CURRENT USE OF INSULIN: ICD-10-CM

## 2024-04-04 RX ORDER — ATORVASTATIN CALCIUM 20 MG/1
TABLET, FILM COATED ORAL
Qty: 90 TABLET | Refills: 0 | Status: SHIPPED | OUTPATIENT
Start: 2024-04-04

## 2024-04-22 ENCOUNTER — OFFICE VISIT (OUTPATIENT)
Dept: FAMILY MEDICINE CLINIC | Facility: CLINIC | Age: 54
End: 2024-04-22
Payer: MEDICARE

## 2024-04-22 VITALS
DIASTOLIC BLOOD PRESSURE: 66 MMHG | WEIGHT: 315 LBS | SYSTOLIC BLOOD PRESSURE: 118 MMHG | BODY MASS INDEX: 39.17 KG/M2 | HEART RATE: 72 BPM | OXYGEN SATURATION: 97 % | HEIGHT: 75 IN

## 2024-04-22 DIAGNOSIS — I89.0 LYMPHEDEMA: ICD-10-CM

## 2024-04-22 DIAGNOSIS — Z00.00 MEDICARE ANNUAL WELLNESS VISIT, SUBSEQUENT: Primary | ICD-10-CM

## 2024-04-22 DIAGNOSIS — N40.0 BENIGN PROSTATIC HYPERPLASIA WITHOUT LOWER URINARY TRACT SYMPTOMS: ICD-10-CM

## 2024-04-22 DIAGNOSIS — E29.1 HYPOGONADISM IN MALE: ICD-10-CM

## 2024-04-22 DIAGNOSIS — E78.1 HYPERTRIGLYCERIDEMIA: ICD-10-CM

## 2024-04-22 DIAGNOSIS — E11.42 DIABETIC PERIPHERAL NEUROPATHY: ICD-10-CM

## 2024-04-22 DIAGNOSIS — E11.42 TYPE 2 DIABETES MELLITUS WITH DIABETIC POLYNEUROPATHY, WITHOUT LONG-TERM CURRENT USE OF INSULIN: ICD-10-CM

## 2024-04-22 DIAGNOSIS — E55.9 VITAMIN D DEFICIENCY: ICD-10-CM

## 2024-04-22 DIAGNOSIS — M1A.09X0 IDIOPATHIC CHRONIC GOUT OF MULTIPLE SITES WITHOUT TOPHUS: ICD-10-CM

## 2024-04-22 DIAGNOSIS — I10 BENIGN ESSENTIAL HYPERTENSION: ICD-10-CM

## 2024-04-22 PROCEDURE — G0439 PPPS, SUBSEQ VISIT: HCPCS | Performed by: FAMILY MEDICINE

## 2024-04-22 PROCEDURE — 1170F FXNL STATUS ASSESSED: CPT | Performed by: FAMILY MEDICINE

## 2024-04-22 PROCEDURE — 3052F HG A1C>EQUAL 8.0%<EQUAL 9.0%: CPT | Performed by: FAMILY MEDICINE

## 2024-04-22 PROCEDURE — 3078F DIAST BP <80 MM HG: CPT | Performed by: FAMILY MEDICINE

## 2024-04-22 PROCEDURE — 99214 OFFICE O/P EST MOD 30 MIN: CPT | Performed by: FAMILY MEDICINE

## 2024-04-22 PROCEDURE — 1159F MED LIST DOCD IN RCRD: CPT | Performed by: FAMILY MEDICINE

## 2024-04-22 PROCEDURE — 1160F RVW MEDS BY RX/DR IN RCRD: CPT | Performed by: FAMILY MEDICINE

## 2024-04-22 PROCEDURE — 3074F SYST BP LT 130 MM HG: CPT | Performed by: FAMILY MEDICINE

## 2024-04-22 RX ORDER — EMPAGLIFLOZIN 25 MG/1
25 TABLET, FILM COATED ORAL DAILY
COMMUNITY
Start: 2024-04-08

## 2024-04-22 NOTE — PROGRESS NOTES
QUICK REFERENCE INFORMATION:  The ABCs of the Annual Wellness Visit    subsequent Medicare Wellness Visit    HEALTH RISK ASSESSMENT    1970  Marcial Desai is a 54 y.o. male who presents for an Subsequent Wellness Visit.    The following portions of the patient's history were reviewed and   updated as appropriate: allergies, current medications, past family history, past medical history, past social history, past surgical history, and problem list.    Compared to one year ago, the patient feels his physical   health is the same.    Compared to one year ago, the patient feels his mental   health is the same.    Recent Hospitalizations:  He was not admitted to the hospital during the last year.     Current Medical Providers:  Patient Care Team:  Tesfaye Swartz DO as PCP - General    I reviewed list of current Medical providers and suppliers with patient and are listed above to the best of the patient's and my knowledge.    Smoking Status:  Social History     Tobacco Use   Smoking Status Never   Smokeless Tobacco Never       Alcohol Consumption:  Social History     Substance and Sexual Activity   Alcohol Use No       Depression Screen:       2024     8:00 AM   PHQ-2/PHQ-9 Depression Screening   Little Interest or Pleasure in Doing Things 0-->not at all   Feeling Down, Depressed or Hopeless 0-->not at all   PHQ-9: Brief Depression Severity Measure Score 0       Health Habits and Functional and Cognitive Screenin/22/2024     8:00 AM   Functional & Cognitive Status   Do you have difficulty preparing food and eating? No   Do you have difficulty bathing yourself, getting dressed or grooming yourself? No   Do you have difficulty using the toilet? No   Do you have difficulty moving around from place to place? No   Do you have trouble with steps or getting out of a bed or a chair? No   Current Diet Well Balanced Diet   Dental Exam Up to date   Eye Exam Up to date   Exercise (times per week) 2 times per week    Current Exercises Include Yard Work   Do you need help using the phone?  No   Are you deaf or do you have serious difficulty hearing?  No   Do you need help to go to places out of walking distance? No   Do you need help shopping? No   Do you need help preparing meals?  No   Do you need help with housework?  No   Do you need help with laundry? No   Do you need help taking your medications? No   Do you need help managing money? No   Do you ever drive or ride in a car without wearing a seat belt? No   Have you felt unusual stress, anger or loneliness in the last month? No   Who do you live with? Spouse   If you need help, do you have trouble finding someone available to you? No   Have you been bothered in the last four weeks by sexual problems? No   Do you have difficulty concentrating, remembering or making decisions? No       Does the patient have evidence of cognitive impairment? No    Aspirin use counseling: Does not need ASA (and currently is not on it)    Recent Lab Results:  CMP:  Lab Results   Component Value Date    BUN 15 02/08/2024    CREATININE 1.07 02/08/2024    EGFRIFNONA 91 11/11/2021    EGFRIFAFRI 105 11/11/2021    BCR 14 02/08/2024     02/08/2024    K 3.9 02/08/2024    CO2 24 02/08/2024    CALCIUM 9.5 02/08/2024    PROTENTOTREF 7.1 02/08/2024    ALBUMIN 4.5 02/08/2024    LABGLOBREF 2.6 02/08/2024    LABIL2 1.7 02/08/2024    BILITOT 0.7 02/08/2024    ALKPHOS 99 02/08/2024    AST 31 02/08/2024    ALT 41 02/08/2024     Lipid Panel:  Lab Results   Component Value Date    TRIG 263 (H) 10/18/2023    HDL 30 (L) 10/18/2023    VLDL 41 (H) 10/18/2023     HbA1c:  Lab Results   Component Value Date    HGBA1C 8.8 (H) 02/08/2024       Visual Acuity:  No results found.    Age-appropriate Screening Schedule:  Refer to the list below for future screening recommendations based on patient's age, sex and/or medical conditions. Orders for these recommended tests are listed in the plan section. The patient has been  provided with a written plan.    Health Maintenance   Topic Date Due    Hepatitis B (1 of 3 - 19+ 3-dose series) Never done    ZOSTER VACCINE (1 of 2) Never done    COVID-19 Vaccine (3 - 2023-24 season) 09/01/2023    INFLUENZA VACCINE  08/01/2024    HEMOGLOBIN A1C  08/08/2024    DIABETIC FOOT EXAM  10/18/2024    LIPID PANEL  10/18/2024    URINE MICROALBUMIN  10/18/2024    BMI FOLLOWUP  10/18/2024    DIABETIC EYE EXAM  01/21/2025    COLORECTAL CANCER SCREENING  04/11/2025    ANNUAL WELLNESS VISIT  04/22/2025    TDAP/TD VACCINES (2 - Td or Tdap) 01/19/2026    HEPATITIS C SCREENING  Completed    Pneumococcal Vaccine 0-64  Completed          Outpatient Medications Prior to Visit   Medication Sig Dispense Refill    ACCU-CHEK TYREL PLUS test strip TEST DAILY 50 each 2    allopurinol (ZYLOPRIM) 100 MG tablet Take 2 tablets by mouth 2 (Two) Times a Day. PT TO TAKE 400MG DAILY 360 tablet 3    ammonium lactate (Lac-Hydrin) 12 % cream Apply  topically to the appropriate area as directed As Needed for Dry Skin (venosu stasis dermatitis). 385 g 5    atorvastatin (LIPITOR) 20 MG tablet TAKE 1 TABLET BY MOUTH AT BEDTIME 90 tablet 0    bisoprolol (ZEBeta) 5 MG tablet Take 1 tablet by mouth Daily. 90 tablet 1    Blood Glucose Monitoring Suppl (ACCU-CHEK TYREL PLUS) w/Device kit       buPROPion XL (WELLBUTRIN XL) 150 MG 24 hr tablet TAKE ONE TABLET BY MOUTH DAILY 90 tablet 3    cetirizine (zyrTEC) 10 MG tablet TAKE ONE TABLET BY MOUTH DAILY 90 tablet 3    cyclobenzaprine (FLEXERIL) 10 MG tablet Take 1 tablet by mouth 3 (Three) Times a Day As Needed for Muscle Spasms. 90 tablet 5    Dulaglutide 3 MG/0.5ML solution pen-injector Inject 0.5 mL under the skin into the appropriate area as directed 1 (One) Time Per Week. 2 mL 5    fluticasone (FLONASE) 50 MCG/ACT nasal spray 2 sprays by Each Nare route Daily. 48 g 3    furosemide (LASIX) 40 MG tablet TAKE ONE TABLET BY MOUTH DAILY 90 tablet 3    gabapentin (NEURONTIN) 400 MG capsule TAKE  ONE CAPSULE BY MOUTH THREE TIMES A  capsule 0    glucose blood test strip Check once daily E11.9 non-iddm;  States [employer] brand covered;  Also disp alcohol swabs, lancets. 50 each 12    glucose monitor monitoring kit Check once daily d x E11.9 non-iddm 1 each 0    hydrocortisone 2.5 % cream Apply  topically 2 (two) times a day. Wipe bottom twice daily 28 g 0    ibuprofen (ADVIL,MOTRIN) 800 MG tablet Take 1 tablet by mouth Every 8 (Eight) Hours As Needed for Mild Pain. 90 tablet 5    indomethacin (INDOCIN) 25 MG capsule 1 po tid x 5 days, then 1 po bid x 5 days, 1 po qd x 5 days then stop 30 capsule 5    Jardiance 25 MG tablet tablet Take 1 tablet by mouth Daily.      Lancets misc 1 Device Daily. Dispense brand covered by insurance. Dx. e11.9 100 each 12    lisinopril (PRINIVIL,ZESTRIL) 10 MG tablet TAKE ONE TABLET BY MOUTH DAILY 90 tablet 1    magnesium oxide (MAGOX) 400 (241.3 Mg) MG tablet tablet TAKE ONE TABLET BY MOUTH DAILY 30 tablet 9    metFORMIN (GLUCOPHAGE) 1000 MG tablet TAKE 1 AND 1/2 TABLETS BY MOUTH IN THE MORNING  and 1 tablet in the evening 235 tablet 0    mupirocin (BACTROBAN) 2 % ointment Apply  topically to the appropriate area as directed 2 (Two) Times a Day. to affected area(s) 30 each 5    potassium chloride (K-DUR,KLOR-CON) 10 MEQ CR tablet TAKE THREE TABLETS BY MOUTH TWICE A DAY 54 tablet 0    sildenafil (VIAGRA) 100 MG tablet 1/2 to 1 po daily prn ED 10 tablet 5    Testosterone 20.25 MG/ACT (1.62%) gel APPLY 6 PUMPS DAILY TO EACH SHOULDERS OR INNER THIGHS 150 g 2    tiZANidine (ZANAFLEX) 4 MG tablet Take 1 tablet by mouth Every 8 (Eight) Hours As Needed for Muscle Spasms. 90 tablet 5    triamcinolone (KENALOG) 0.1 % cream Apply  topically to the appropriate area as directed 2 (Two) Times a Day. 30 g 2    urea (CARMOL) 40 % cream APPLY TO AFFECTED AREA(S) ON SECOND TOE DAILY AS DIRECTED 28.35 each 5    vitamin D3 125 MCG (5000 UT) capsule capsule Take 1 capsule by mouth Daily. 30  capsule 6    Dulaglutide (Trulicity) 1.5 MG/0.5ML solution pen-injector Inject 1.5 mg under the skin into the appropriate area as directed 1 (One) Time Per Week. (Patient not taking: Reported on 4/22/2024) 6 mL 3     Facility-Administered Medications Prior to Visit   Medication Dose Route Frequency Provider Last Rate Last Admin    Testosterone Cypionate (DEPOTESTOTERONE CYPIONATE) injection 100 mg  100 mg Intramuscular Q14 Days Tesfaye Swartz, DO   100 mg at 06/25/19 1220       Patient Active Problem List   Diagnosis    Ankle pain    Atopic dermatitis    Benign essential hypertension    Type 2 diabetes mellitus with diabetic polyneuropathy, without long-term current use of insulin    Diabetic peripheral neuropathy    Abnormal liver function tests    Idiopathic chronic gout of multiple sites without tophus    Hypertriglyceridemia    Cramps of lower extremity    Edema of lower extremity    Lymphedema    Osteoarthritis    Morbid obesity due to excess calories    Hypogonadism in male    Ganglion cyst - left clavicle       Advance Care Planning:  ACP discussion was held with the patient during this visit. Patient does not have an advance directive, information provided.    Identification of Risk Factors:  Risk factors include: Obesity/Overweight .    Review of Systems   Respiratory:  Negative for shortness of breath.    Cardiovascular:  Negative for chest pain, palpitations, orthopnea and PND.   Musculoskeletal:  Positive for gout.       Compared to one year ago, the patient feels his physical health is the same.  Compared to one year ago, the patient feels his mental health is the same.    Objective     Physical Exam  Vitals and nursing note reviewed.   Constitutional:       Appearance: He is well-developed.   HENT:      Head: Normocephalic and atraumatic.   Neck:      Thyroid: No thyromegaly.      Vascular: No JVD.   Cardiovascular:      Rate and Rhythm: Normal rate and regular rhythm.      Heart sounds: Normal  "heart sounds. No murmur heard.     No friction rub. No gallop.   Pulmonary:      Effort: Pulmonary effort is normal. No respiratory distress.      Breath sounds: Normal breath sounds. No wheezing or rales.   Abdominal:      General: Bowel sounds are normal. There is no distension.      Palpations: Abdomen is soft.      Tenderness: There is no abdominal tenderness. There is no guarding or rebound.   Musculoskeletal:      Cervical back: Neck supple.      Right lower leg: Edema present.      Left lower leg: Edema present.   Skin:     General: Skin is warm and dry.   Neurological:      Mental Status: He is alert.   Psychiatric:         Behavior: Behavior normal.         Vitals:    04/22/24 0842   BP: 118/66   Pulse: 72   SpO2: 97%   Weight: (!) 154 kg (340 lb)   Height: 190.5 cm (75\")   PainSc: 0-No pain     Body mass index is 42.5 kg/m².           Assessment & Plan   Patient Self-Management and Personalized Health Advice  The patient has been provided with information about: diet and exercise and preventive services including:   Annual Wellness Visit (AWV).    Visit Diagnoses:    ICD-10-CM ICD-9-CM   1. Medicare annual wellness visit, subsequent  Z00.00 V70.0   2. Type 2 diabetes mellitus with diabetic polyneuropathy, without long-term current use of insulin  E11.42 250.60     357.2   3. Hypertriglyceridemia  E78.1 272.1   4. Hypogonadism in male  E29.1 257.2   5. Benign essential hypertension  I10 401.1   6. Diabetic peripheral neuropathy  E11.42 250.60     357.2   7. Idiopathic chronic gout of multiple sites without tophus  M1A.09X0 274.02   8. Lymphedema  I89.0 457.1   9. Vitamin D deficiency  E55.9 268.9   10. Benign prostatic hyperplasia without lower urinary tract symptoms  N40.0 600.00       Orders Placed This Encounter   Procedures    Comprehensive Metabolic Panel     Order Specific Question:   Release to patient     Answer:   Routine Release [[id number]]    Testosterone     Order Specific Question:   Release " to patient     Answer:   Routine Release [[id number]]    PSA DIAGNOSTIC     Order Specific Question:   Release to patient     Answer:   Routine Release [[id number]]    Hemoglobin A1c     Order Specific Question:   Release to patient     Answer:   Routine Release [[id number]]    Lipid Panel     Order Specific Question:   Release to patient     Answer:   Routine Release [[id number]]    Uric Acid     Order Specific Question:   Release to patient     Answer:   Routine Release [[id number]]    Vitamin D,25-Hydroxy     Order Specific Question:   Release to patient     Answer:   Routine Release [[id number]]    Microalbumin / Creatinine Urine Ratio - Urine, Clean Catch     Order Specific Question:   Release to patient     Answer:   Routine Release [[id number]]    CBC & Differential     Order Specific Question:   Manual Differential     Answer:   No     Order Specific Question:   Release to patient     Answer:   Routine Release [[id number]]       Outpatient Encounter Medications as of 4/22/2024   Medication Sig Dispense Refill    ACCU-CHEK TYREL PLUS test strip TEST DAILY 50 each 2    allopurinol (ZYLOPRIM) 100 MG tablet Take 2 tablets by mouth 2 (Two) Times a Day. PT TO TAKE 400MG DAILY 360 tablet 3    ammonium lactate (Lac-Hydrin) 12 % cream Apply  topically to the appropriate area as directed As Needed for Dry Skin (venosu stasis dermatitis). 385 g 5    atorvastatin (LIPITOR) 20 MG tablet TAKE 1 TABLET BY MOUTH AT BEDTIME 90 tablet 0    bisoprolol (ZEBeta) 5 MG tablet Take 1 tablet by mouth Daily. 90 tablet 1    Blood Glucose Monitoring Suppl (ACCU-CHEK TYREL PLUS) w/Device kit       buPROPion XL (WELLBUTRIN XL) 150 MG 24 hr tablet TAKE ONE TABLET BY MOUTH DAILY 90 tablet 3    cetirizine (zyrTEC) 10 MG tablet TAKE ONE TABLET BY MOUTH DAILY 90 tablet 3    cyclobenzaprine (FLEXERIL) 10 MG tablet Take 1 tablet by mouth 3 (Three) Times a Day As Needed for Muscle Spasms. 90 tablet 5    Dulaglutide 3 MG/0.5ML solution  pen-injector Inject 0.5 mL under the skin into the appropriate area as directed 1 (One) Time Per Week. 2 mL 5    fluticasone (FLONASE) 50 MCG/ACT nasal spray 2 sprays by Each Nare route Daily. 48 g 3    furosemide (LASIX) 40 MG tablet TAKE ONE TABLET BY MOUTH DAILY 90 tablet 3    gabapentin (NEURONTIN) 400 MG capsule TAKE ONE CAPSULE BY MOUTH THREE TIMES A  capsule 0    glucose blood test strip Check once daily E11.9 non-iddm;  States [employer] brand covered;  Also disp alcohol swabs, lancets. 50 each 12    glucose monitor monitoring kit Check once daily d x E11.9 non-iddm 1 each 0    hydrocortisone 2.5 % cream Apply  topically 2 (two) times a day. Wipe bottom twice daily 28 g 0    ibuprofen (ADVIL,MOTRIN) 800 MG tablet Take 1 tablet by mouth Every 8 (Eight) Hours As Needed for Mild Pain. 90 tablet 5    indomethacin (INDOCIN) 25 MG capsule 1 po tid x 5 days, then 1 po bid x 5 days, 1 po qd x 5 days then stop 30 capsule 5    Jardiance 25 MG tablet tablet Take 1 tablet by mouth Daily.      Lancets misc 1 Device Daily. Dispense brand covered by insurance. Dx. e11.9 100 each 12    lisinopril (PRINIVIL,ZESTRIL) 10 MG tablet TAKE ONE TABLET BY MOUTH DAILY 90 tablet 1    magnesium oxide (MAGOX) 400 (241.3 Mg) MG tablet tablet TAKE ONE TABLET BY MOUTH DAILY 30 tablet 9    metFORMIN (GLUCOPHAGE) 1000 MG tablet TAKE 1 AND 1/2 TABLETS BY MOUTH IN THE MORNING  and 1 tablet in the evening 235 tablet 0    mupirocin (BACTROBAN) 2 % ointment Apply  topically to the appropriate area as directed 2 (Two) Times a Day. to affected area(s) 30 each 5    potassium chloride (K-DUR,KLOR-CON) 10 MEQ CR tablet TAKE THREE TABLETS BY MOUTH TWICE A DAY 54 tablet 0    sildenafil (VIAGRA) 100 MG tablet 1/2 to 1 po daily prn ED 10 tablet 5    Testosterone 20.25 MG/ACT (1.62%) gel APPLY 6 PUMPS DAILY TO EACH SHOULDERS OR INNER THIGHS 150 g 2    tiZANidine (ZANAFLEX) 4 MG tablet Take 1 tablet by mouth Every 8 (Eight) Hours As Needed for Muscle  Spasms. 90 tablet 5    triamcinolone (KENALOG) 0.1 % cream Apply  topically to the appropriate area as directed 2 (Two) Times a Day. 30 g 2    urea (CARMOL) 40 % cream APPLY TO AFFECTED AREA(S) ON SECOND TOE DAILY AS DIRECTED 28.35 each 5    vitamin D3 125 MCG (5000 UT) capsule capsule Take 1 capsule by mouth Daily. 30 capsule 6    Dulaglutide (Trulicity) 1.5 MG/0.5ML solution pen-injector Inject 1.5 mg under the skin into the appropriate area as directed 1 (One) Time Per Week. (Patient not taking: Reported on 4/22/2024) 6 mL 3     Facility-Administered Encounter Medications as of 4/22/2024   Medication Dose Route Frequency Provider Last Rate Last Admin    Testosterone Cypionate (DEPOTESTOTERONE CYPIONATE) injection 100 mg  100 mg Intramuscular Q14 Days Tesfaye Swartz DO   100 mg at 06/25/19 1220       Reviewed use of high risk medication in the elderly: yes  Reviewed for potential of harmful drug interactions in the elderly: yes  No opioid medication identified on active medication list. I have reviewed chart for other potential  high risk medication/s and harmful drug interactions in the elderly.    Social History     Socioeconomic History    Marital status:    Tobacco Use    Smoking status: Never    Smokeless tobacco: Never   Vaping Use    Vaping status: Never Used   Substance and Sexual Activity    Alcohol use: No    Drug use: No    Sexual activity: Defer     Patient was screened for OUD and PARISH risk factors.  Marcial Desai's pain level was assessed as well today.  I included a review current recommendations on pain management, best use practices, current CDC guidelines, alternatives to opioids including OTC & Rx topicals, non-opioid oral medications (eg nsaids, acetominophen) and life style interventions such as yoga, delgado chi, stretching, regular exercise, PT/OT and referral to specialty care like Pain Management that specialize in pain treatment when appropriate.   I also included a review of serious  risks of opioid use and substance use disorder.  I have included all of this in the after visit summary along with other risk factors identified that are pertinent to the patient today.      Follow Up:  Return in about 6 months (around 10/22/2024), or if symptoms worsen or fail to improve.     An After Visit Summary and PPPS with all of these plans were given to the patient.           ++++++++++++++++++++++++++++++++++++++++++++++++++++++++++++++++++   Additional E&M Note during same encounter follows:  Patient has multiple medical problems which are significant and separately identifiable that require additional work above and beyond the Medicare Wellness Visit.   Chief Complaint   Patient presents with    Diabetes    Medicare Wellness-subsequent    Gout    Hyperlipidemia    Hypertension   Answers submitted by the patient for this visit:  Primary Reason for Visit (Submitted on 4/20/2024)  What is the primary reason for your visit?: Other  Other (Submitted on 4/20/2024)  Please describe your symptoms.: Heart monitor check up and regular check up  Have you had these symptoms before?: No  How long have you been having these symptoms?: Greater than 2 weeks  Please list any medications you are currently taking for this condition.: Bisinproil  Diabetes  He presents for his follow-up diabetic visit. He has type 2 diabetes mellitus. His disease course has been stable. Pertinent negatives for diabetes include no chest pain and no foot paresthesias. Symptoms are stable.   Gout  This is a chronic problem. The problem occurs constantly. The problem has been unchanged. Pertinent negatives include no chest pain.   Hyperlipidemia  This is a chronic problem. Pertinent negatives include no chest pain or shortness of breath. Current antihyperlipidemic treatment includes statins. The current treatment provides moderate improvement of lipids.   Hypertension  This is a chronic problem. The problem is unchanged. The problem is  "controlled. Pertinent negatives include no chest pain, orthopnea, palpitations, peripheral edema, PND or shortness of breath.     Had holter and sinus tach, placedon beta blocker and helping;  no further;     No gout attacks  On testosterone, doing well; no urinary issues;      Fell trying to mow, some bruising but no other injuries and ok motion hand.  Off balance and falls a lot  Review of Systems   Cardiovascular:  Negative for chest pain, orthopnea, palpitations and paroxysmal nocturnal dyspnea.   Respiratory:  Negative for shortness of breath.    Musculoskeletal:  Positive for gout.       Social History     Tobacco Use    Smoking status: Never    Smokeless tobacco: Never   Substance Use Topics    Alcohol use: No     O:   Vitals:    04/22/24 0842   BP: 118/66   Pulse: 72   SpO2: 97%   Weight: (!) 154 kg (340 lb)   Height: 190.5 cm (75\")   PainSc: 0-No pain     Body mass index is 42.5 kg/m².  Vitals and nursing note reviewed.   Constitutional:       Appearance: Well-developed.   HENT:      Head: Normocephalic and atraumatic.   Neck:      Thyroid: No thyromegaly.      Vascular: No JVD.   Pulmonary:      Effort: Pulmonary effort is normal. No respiratory distress.      Breath sounds: Normal breath sounds. No wheezing. No rales.   Cardiovascular:      Normal rate. Regular rhythm. Normal heart sounds.      No gallop.  No friction rub.   Edema:     Pretibial: bilateral edema of the pretibial area.  Abdominal:      General: Bowel sounds are normal. There is no distension.      Palpations: Abdomen is soft.      Tenderness: There is no abdominal tenderness. There is no guarding or rebound.   Musculoskeletal:      Cervical back: Neck supple. Skin:     General: Skin is warm and dry.   Neurological:      Mental Status: Alert.   Psychiatric:         Behavior: Behavior normal.       Date of Study: 3/1/24   Ordering Provider: Tesfaye Swartz DO   Clinical Indications: Palpitations [R00.2 (ICD-10-CM)]          Reading " Physicians  Performing Staff   Cardiology: William Sawant MD    Tech: Leena Aguayo        Interpretation Summary         A normal monitor study.    The triggered events correlated with sinus tachycardia, but no arrhythmia.     Diagnoses and all orders for this visit:    1. Medicare annual wellness visit, subsequent (Primary)    2. Type 2 diabetes mellitus with diabetic polyneuropathy, without long-term current use of insulin  -     Comprehensive Metabolic Panel  -     Hemoglobin A1c  -     Lipid Panel  -     Microalbumin / Creatinine Urine Ratio - Urine, Clean Catch    3. Hypertriglyceridemia  -     Comprehensive Metabolic Panel  -     Lipid Panel    4. Hypogonadism in male  -     Comprehensive Metabolic Panel  -     Testosterone  -     CBC & Differential    5. Benign essential hypertension  -     Comprehensive Metabolic Panel    6. Diabetic peripheral neuropathy    7. Idiopathic chronic gout of multiple sites without tophus  -     Uric Acid    8. Lymphedema    9. Vitamin D deficiency  -     Vitamin D,25-Hydroxy    10. Benign prostatic hyperplasia without lower urinary tract symptoms  -     PSA DIAGNOSTIC    -dm2 - continue medications, recheck labs;  on 3mg trulicity and tolerating well, consider going to 4.5mg; has hard time getting jardiance as short stock.  -HLD - continue statin, recheck lipids.  -hypertension - controlled, continue medications  -I explained at length to patient risks of replacement that can include acne, mood changes, cardiovascular disease, polycythemia and liver dysfunction.  I also warned about concerns regarding prostate and breast cancer.   I discussed with potential for improvement in fatigue and preventing osteoporosis.  I discussed will need to monitor labs regularly.  -neuropathy - tight blood sugar control  -due check vitmainD and psa  -due check uric acid, continue allopurinol  -lymphedema - contineu compression hose      Return in about 6 months (around 10/22/2024), or if  symptoms worsen or fail to improve.

## 2024-04-22 NOTE — PATIENT INSTRUCTIONS
Advance Care Planning and Advance Directives     You make decisions on a daily basis - decisions about where you want to live, your career, your home, your life. Perhaps one of the most important decisions you face is your choice for future medical care. Take time to talk with your family and your healthcare team and start planning today.  Advance Care Planning is a process that can help you:  Understand possible future healthcare decisions in light of your own experiences  Reflect on those decision in light of your goals and values  Discuss your decisions with those closest to you and the healthcare professionals that care for you  Make a plan by creating a document that reflects your wishes    Surrogate Decision Maker  In the event of a medical emergency, which has left you unable to communicate or to make your own decisions, you would need someone to make decisions for you.  It is important to discuss your preferences for medical treatment with this person while you are in good health.     Qualities of a surrogate decision maker:  Willing to take on this role and responsibility  Knows what you want for future medical care  Willing to follow your wishes even if they don't agree with them  Able to make difficult medical decisions under stressful circumstances    Advance Directives  These are legal documents you can create that will guide your healthcare team and decision maker(s) when needed. These documents can be stored in the electronic medical record.    Living Will - a legal document to guide your care if you have a terminal condition or a serious illness and are unable to communicate. States vary by statute in document names/types, but most forms may include one or more of the following:        -  Directions regarding life-prolonging treatments        -  Directions regarding artificially provided nutrition/hydration        -  Choosing a healthcare decision maker        -  Direction regarding organ/tissue  donation    Durable Power of  for Healthcare - this document names an -in-fact to make medical decisions for you, but it may also allow this person to make personal and financial decisions for you. Please seek the advice of an  if you need this type of document.    **Advance Directives are not required and no one may discriminate against you if you do not sign one.    Medical Orders  Many states allow specific forms/orders signed by your physician to record your wishes for medical treatment in your current state of health. This form, signed in personal communication with your physician, addresses resuscitation and other medical interventions that you may or may not want.      For more information or to schedule a time with a [facility] Advance Care Planning Facilitator contact: [facility]/ACP or call [phone] and someone will contact you directly.Calorie Counting for Weight Loss  Calories are units of energy. Your body needs a certain number of calories from food to keep going throughout the day. When you eat or drink more calories than your body needs, your body stores the extra calories mostly as fat. When you eat or drink fewer calories than your body needs, your body burns fat to get the energy it needs.  Calorie counting means keeping track of how many calories you eat and drink each day. Calorie counting can be helpful if you need to lose weight. If you eat fewer calories than your body needs, you should lose weight. Ask your health care provider what a healthy weight is for you.  For calorie counting to work, you will need to eat the right number of calories each day to lose a healthy amount of weight per week. A dietitian can help you figure out how many calories you need in a day and will suggest ways to reach your calorie goal.  A healthy amount of weight to lose each week is usually 1-2 lb (0.5-0.9 kg). This usually means that your daily calorie intake should be  reduced by 500-750 calories.  Eating 1,200-1,500 calories a day can help most women lose weight.  Eating 1,500-1,800 calories a day can help most men lose weight.  What do I need to know about calorie counting?  Work with your health care provider or dietitian to determine how many calories you should get each day. To meet your daily calorie goal, you will need to:  Find out how many calories are in each food that you would like to eat. Try to do this before you eat.  Decide how much of the food you plan to eat.  Keep a food log. Do this by writing down what you ate and how many calories it had.  To successfully lose weight, it is important to balance calorie counting with a healthy lifestyle that includes regular activity.  Where do I find calorie information?  The number of calories in a food can be found on a Nutrition Facts label. If a food does not have a Nutrition Facts label, try to look up the calories online or ask your dietitian for help.  Remember that calories are listed per serving. If you choose to have more than one serving of a food, you will have to multiply the calories per serving by the number of servings you plan to eat. For example, the label on a package of bread might say that a serving size is 1 slice and that there are 90 calories in a serving. If you eat 1 slice, you will have eaten 90 calories. If you eat 2 slices, you will have eaten 180 calories.  How do I keep a food log?  After each time that you eat, record the following in your food log as soon as possible:  What you ate. Be sure to include toppings, sauces, and other extras on the food.  How much you ate. This can be measured in cups, ounces, or number of items.  How many calories were in each food and drink.  The total number of calories in the food you ate.  Keep your food log near you, such as in a pocket-sized notebook or on an dania or website on your mobile phone. Some programs will calculate calories for you and show you how  many calories you have left to meet your daily goal.  What are some portion-control tips?  Know how many calories are in a serving. This will help you know how many servings you can have of a certain food.  Use a measuring cup to measure serving sizes. You could also try weighing out portions on a kitchen scale. With time, you will be able to estimate serving sizes for some foods.  Take time to put servings of different foods on your favorite plates or in your favorite bowls and cups so you know what a serving looks like.  Try not to eat straight from a food's packaging, such as from a bag or box. Eating straight from the package makes it hard to see how much you are eating and can lead to overeating. Put the amount you would like to eat in a cup or on a plate to make sure you are eating the right portion.  Use smaller plates, glasses, and bowls for smaller portions and to prevent overeating.  Try not to multitask. For example, avoid watching TV or using your computer while eating. If it is time to eat, sit down at a table and enjoy your food. This will help you recognize when you are full. It will also help you be more mindful of what and how much you are eating.  What are tips for following this plan?  Reading food labels  Check the calorie count compared with the serving size. The serving size may be smaller than what you are used to eating.  Check the source of the calories. Try to choose foods that are high in protein, fiber, and vitamins, and low in saturated fat, trans fat, and sodium.  Shopping  Read nutrition labels while you shop. This will help you make healthy decisions about which foods to buy.  Pay attention to nutrition labels for low-fat or fat-free foods. These foods sometimes have the same number of calories or more calories than the full-fat versions. They also often have added sugar, starch, or salt to make up for flavor that was removed with the fat.  Make a grocery list of lower-calorie foods  and stick to it.  Cooking  Try to cook your favorite foods in a healthier way. For example, try baking instead of frying.  Use low-fat dairy products.  Meal planning  Use more fruits and vegetables. One-half of your plate should be fruits and vegetables.  Include lean proteins, such as chicken, turkey, and fish.  Lifestyle  Each week, aim to do one of the followin minutes of moderate exercise, such as walking.  75 minutes of vigorous exercise, such as running.  General information  Know how many calories are in the foods you eat most often. This will help you calculate calorie counts faster.  Find a way of tracking calories that works for you. Get creative. Try different apps or programs if writing down calories does not work for you.  What foods should I eat?    Eat nutritious foods. It is better to have a nutritious, high-calorie food, such as an avocado, than a food with few nutrients, such as a bag of potato chips.  Use your calories on foods and drinks that will fill you up and will not leave you hungry soon after eating.  Examples of foods that fill you up are nuts and nut butters, vegetables, lean proteins, and high-fiber foods such as whole grains. High-fiber foods are foods with more than 5 g of fiber per serving.  Pay attention to calories in drinks. Low-calorie drinks include water and unsweetened drinks.  The items listed above may not be a complete list of foods and beverages you can eat. Contact a dietitian for more information.  What foods should I limit?  Limit foods or drinks that are not good sources of vitamins, minerals, or protein or that are high in unhealthy fats. These include:  Candy.  Other sweets.  Sodas, specialty coffee drinks, alcohol, and juice.  The items listed above may not be a complete list of foods and beverages you should avoid. Contact a dietitian for more information.  How do I count calories when eating out?  Pay attention to portions. Often, portions are much larger  when eating out. Try these tips to keep portions smaller:  Consider sharing a meal instead of getting your own.  If you get your own meal, eat only half of it. Before you start eating, ask for a container and put half of your meal into it.  When available, consider ordering smaller portions from the menu instead of full portions.  Pay attention to your food and drink choices. Knowing the way food is cooked and what is included with the meal can help you eat fewer calories.  If calories are listed on the menu, choose the lower-calorie options.  Choose dishes that include vegetables, fruits, whole grains, low-fat dairy products, and lean proteins.  Choose items that are boiled, broiled, grilled, or steamed. Avoid items that are buttered, battered, fried, or served with cream sauce. Items labeled as crispy are usually fried, unless stated otherwise.  Choose water, low-fat milk, unsweetened iced tea, or other drinks without added sugar. If you want an alcoholic beverage, choose a lower-calorie option, such as a glass of wine or light beer.  Ask for dressings, sauces, and syrups on the side. These are usually high in calories, so you should limit the amount you eat.  If you want a salad, choose a garden salad and ask for grilled meats. Avoid extra toppings such as roque, cheese, or fried items. Ask for the dressing on the side, or ask for olive oil and vinegar or lemon to use as dressing.  Estimate how many servings of a food you are given. Knowing serving sizes will help you be aware of how much food you are eating at restaurants.  Where to find more information  Centers for Disease Control and Prevention: www.cdc.gov  [place] Department of Agriculture: myplate.gov  Summary  Calorie counting means keeping track of how many calories you eat and drink each day. If you eat fewer calories than your body needs, you should lose weight.  A healthy amount of weight to lose per week is usually 1-2 lb (0.5-0.9 kg). This usually  means reducing your daily calorie intake by 500-750 calories.  The number of calories in a food can be found on a Nutrition Facts label. If a food does not have a Nutrition Facts label, try to look up the calories online or ask your dietitian for help.  Use smaller plates, glasses, and bowls for smaller portions and to prevent overeating.  Use your calories on foods and drinks that will fill you up and not leave you hungry shortly after a meal.  This information is not intended to replace advice given to you by your health care provider. Make sure you discuss any questions you have with your health care provider.  Document Revised: 01/28/2021 Document Reviewed: 01/28/2021  [employer] Patient Education © 2022 [employer]    Exercising to Lose Weight  Getting regular exercise is important for everyone. It is especially important if you are overweight. Being overweight increases your risk of heart disease, stroke, diabetes, high blood pressure, and several types of cancer. Exercising, and reducing the calories you consume, can help you lose weight and improve fitness and health.  Exercise can be moderate or vigorous intensity. To lose weight, most people need to do a certain amount of moderate or vigorous-intensity exercise each week.  How can exercise affect me?  You lose weight when you exercise enough to burn more calories than you eat. Exercise also reduces body fat and builds muscle. The more muscle you have, the more calories you burn. Exercise also:  Improves mood.  Reduces stress and tension.  Improves your overall fitness, flexibility, and endurance.  Increases bone strength.  Moderate-intensity exercise  Moderate-intensity exercise is any activity that gets you moving enough to burn at least three times more energy (calories) than if you were sitting.  Examples of moderate exercise include:  Walking a mile in 15 minutes.  Doing light yard work.  Biking at an easy pace.  Most people should get at least 150  minutes of moderate-intensity exercise a week to maintain their body weight.  Vigorous-intensity exercise  Vigorous-intensity exercise is any activity that gets you moving enough to burn at least six times more calories than if you were sitting. When you exercise at this intensity, you should be working hard enough that you are not able to carry on a conversation.  Examples of vigorous exercise include:  Running.  Playing a team sport, such as football, basketball, and soccer.  Jumping rope.  Most people should get at least 75 minutes a week of vigorous exercise to maintain their body weight.  What actions can I take to lose weight?  The amount of exercise you need to lose weight depends on:  Your age.  The type of exercise.  Any health conditions you have.  Your overall physical ability.  Talk to your health care provider about how much exercise you need and what types of activities are safe for you.  Nutrition    Make changes to your diet as told by your health care provider or diet and nutrition specialist (dietitian). This may include:  Eating fewer calories.  Eating more protein.  Eating less unhealthy fats.  Eating a diet that includes fresh fruits and vegetables, whole grains, low-fat dairy products, and lean protein.  Avoiding foods with added fat, salt, and sugar.  Drink plenty of water while you exercise to prevent dehydration or heat stroke.  Activity  Choose an activity that you enjoy and set realistic goals. Your health care provider can help you make an exercise plan that works for you.  Exercise at a moderate or vigorous intensity most days of the week.  The intensity of exercise may vary from person to person. You can tell how intense a workout is for you by paying attention to your breathing and heartbeat. Most people will notice their breathing and heartbeat get faster with more intense exercise.  Do resistance training twice each week, such as:  Push-ups.  Sit-ups.  Lifting weights.  Using  resistance bands.  Getting short amounts of exercise can be just as helpful as long, structured periods of exercise. If you have trouble finding time to exercise, try doing these things as part of your daily routine:  Get up, stretch, and walk around every 30 minutes throughout the day.  Go for a walk during your lunch break.  Park your car farther away from your destination.  If you take public transportation, get off one stop early and walk the rest of the way.  Make phone calls while standing up and walking around.  Take the stairs instead of elevators or escalators.  Wear comfortable clothes and shoes with good support.  Do not exercise so much that you hurt yourself, feel dizzy, or get very short of breath.  Where to find more information  [place] Department of Health and Human Services: www.hhs.gov  Centers for Disease Control and Prevention: www.cdc.gov  Contact a health care provider:  Before starting a new exercise program.  If you have questions or concerns about your weight.  If you have a medical problem that keeps you from exercising.  Get help right away if:  You have any of the following while exercising:  Injury.  Dizziness.  Difficulty breathing or shortness of breath that does not go away when you stop exercising.  Chest pain.  Rapid heartbeat.  These symptoms may represent a serious problem that is an emergency. Do not wait to see if the symptoms will go away. Get medical help right away. Call your local emergency services (911 in the [place] Do not drive yourself to the hospital.  Summary  Getting regular exercise is especially important if you are overweight.  Being overweight increases your risk of heart disease, stroke, diabetes, high blood pressure, and several types of cancer.  Losing weight happens when you burn more calories than you eat.  Reducing the amount of calories you eat, and getting regular moderate or vigorous exercise each week, helps you lose weight.  This information is not  intended to replace advice given to you by your health care provider. Make sure you discuss any questions you have with your health care provider.  Document Revised: 02/13/2022 Document Reviewed: 02/13/2022  [employer] Patient Education © 2022 [employer]

## 2024-04-23 LAB
25(OH)D3+25(OH)D2 SERPL-MCNC: 23.9 NG/ML (ref 30–100)
ALBUMIN SERPL-MCNC: 4.3 G/DL (ref 3.8–4.9)
ALBUMIN/CREAT UR: <5 MG/G CREAT (ref 0–29)
ALBUMIN/GLOB SERPL: 1.6 {RATIO} (ref 1.2–2.2)
ALP SERPL-CCNC: 100 IU/L (ref 44–121)
ALT SERPL-CCNC: 33 IU/L (ref 0–44)
AST SERPL-CCNC: 30 IU/L (ref 0–40)
BASOPHILS # BLD AUTO: 0 X10E3/UL (ref 0–0.2)
BASOPHILS NFR BLD AUTO: 1 %
BILIRUB SERPL-MCNC: 0.7 MG/DL (ref 0–1.2)
BUN SERPL-MCNC: 15 MG/DL (ref 6–24)
BUN/CREAT SERPL: 16 (ref 9–20)
CALCIUM SERPL-MCNC: 9.6 MG/DL (ref 8.7–10.2)
CHLORIDE SERPL-SCNC: 96 MMOL/L (ref 96–106)
CHOLEST SERPL-MCNC: 129 MG/DL (ref 100–199)
CO2 SERPL-SCNC: 24 MMOL/L (ref 20–29)
CREAT SERPL-MCNC: 0.96 MG/DL (ref 0.76–1.27)
CREAT UR-MCNC: 57.5 MG/DL
EGFRCR SERPLBLD CKD-EPI 2021: 95 ML/MIN/1.73
EOSINOPHIL # BLD AUTO: 0.1 X10E3/UL (ref 0–0.4)
EOSINOPHIL NFR BLD AUTO: 2 %
ERYTHROCYTE [DISTWIDTH] IN BLOOD BY AUTOMATED COUNT: 13.5 % (ref 11.6–15.4)
GLOBULIN SER CALC-MCNC: 2.7 G/DL (ref 1.5–4.5)
GLUCOSE SERPL-MCNC: 207 MG/DL (ref 70–99)
HBA1C MFR BLD: 8.7 % (ref 4.8–5.6)
HCT VFR BLD AUTO: 45.8 % (ref 37.5–51)
HDLC SERPL-MCNC: 28 MG/DL
HGB BLD-MCNC: 14.9 G/DL (ref 13–17.7)
IMM GRANULOCYTES # BLD AUTO: 0 X10E3/UL (ref 0–0.1)
IMM GRANULOCYTES NFR BLD AUTO: 0 %
LDLC SERPL CALC-MCNC: 44 MG/DL (ref 0–99)
LYMPHOCYTES # BLD AUTO: 1.7 X10E3/UL (ref 0.7–3.1)
LYMPHOCYTES NFR BLD AUTO: 26 %
MCH RBC QN AUTO: 29.1 PG (ref 26.6–33)
MCHC RBC AUTO-ENTMCNC: 32.5 G/DL (ref 31.5–35.7)
MCV RBC AUTO: 90 FL (ref 79–97)
MICROALBUMIN UR-MCNC: <3 UG/ML
MONOCYTES # BLD AUTO: 0.4 X10E3/UL (ref 0.1–0.9)
MONOCYTES NFR BLD AUTO: 6 %
NEUTROPHILS # BLD AUTO: 4.1 X10E3/UL (ref 1.4–7)
NEUTROPHILS NFR BLD AUTO: 65 %
PLATELET # BLD AUTO: 150 X10E3/UL (ref 150–450)
POTASSIUM SERPL-SCNC: 4 MMOL/L (ref 3.5–5.2)
PROT SERPL-MCNC: 7 G/DL (ref 6–8.5)
PSA SERPL-MCNC: 0.3 NG/ML (ref 0–4)
RBC # BLD AUTO: 5.12 X10E6/UL (ref 4.14–5.8)
SODIUM SERPL-SCNC: 136 MMOL/L (ref 134–144)
TESTOST SERPL-MCNC: 223 NG/DL (ref 264–916)
TRIGL SERPL-MCNC: 380 MG/DL (ref 0–149)
URATE SERPL-MCNC: 5.6 MG/DL (ref 3.8–8.4)
VLDLC SERPL CALC-MCNC: 57 MG/DL (ref 5–40)
WBC # BLD AUTO: 6.3 X10E3/UL (ref 3.4–10.8)

## 2024-04-28 NOTE — PROGRESS NOTES
Call results to patient.  A1c slight improvement, if still tolerating 3mg trulicity, I would go up to 4.5mg and ok to send in and 90 days if covered.  Restart jardiance when he can get.  Vitamin D low -change vitamin D to D3 50,000 1 po weekly send #12 3 ref.  Blood counts and psa normal  Testosterone down, will check again at follow-up

## 2024-04-29 RX ORDER — ERGOCALCIFEROL 1.25 MG/1
50000 CAPSULE ORAL WEEKLY
Qty: 12 CAPSULE | Refills: 3 | Status: SHIPPED | OUTPATIENT
Start: 2024-04-29

## 2024-04-29 RX ORDER — DULAGLUTIDE 1.5 MG/.5ML
1.5 INJECTION, SOLUTION SUBCUTANEOUS WEEKLY
Qty: 6 ML | Refills: 3 | Status: SHIPPED | OUTPATIENT
Start: 2024-04-29 | End: 2024-04-29 | Stop reason: DRUGHIGH

## 2024-04-29 NOTE — TELEPHONE ENCOUNTER
Caller: Marcial Desai    Relationship: Self    Best call back number: [phone]    What is the best time to reach you: ANY    Who are you requesting to speak with (clinical staff, provider,  specific staff member): CLINICAL STAFF    Do you know the name of the person who called:     What was the call regarding: PATIENT IS NEEDING A PRE AUTHORIZATION FOR THE HIGHER DOSAGE OF Dulaglutide (Trulicity) 1.5 MG/0.5ML solution pen-injector PLEASE SEND OVER TO PHARMACY. CALL PATIENT TO ADVISE.    Is it okay if the provider responds through MyChart:

## 2024-05-28 RX ORDER — LISINOPRIL 10 MG/1
10 TABLET ORAL DAILY
Qty: 90 TABLET | Refills: 1 | Status: SHIPPED | OUTPATIENT
Start: 2024-05-28

## 2024-06-14 ENCOUNTER — TELEPHONE (OUTPATIENT)
Dept: FAMILY MEDICINE CLINIC | Facility: CLINIC | Age: 54
End: 2024-06-14
Payer: MEDICARE

## 2024-06-14 DIAGNOSIS — R60.0 EDEMA OF LOWER EXTREMITY: ICD-10-CM

## 2024-06-14 RX ORDER — DOXYCYCLINE HYCLATE 100 MG/1
100 CAPSULE ORAL 2 TIMES DAILY
Qty: 20 CAPSULE | Refills: 0 | Status: SHIPPED | OUTPATIENT
Start: 2024-06-14

## 2024-06-14 NOTE — TELEPHONE ENCOUNTER
Caller: Marcial Desai    Relationship: Self    Best call back number: [phone]     What medication are you requesting:      What are your current symptoms:      How long have you been experiencing symptoms:      Have you had these symptoms before:    [] Yes  [] No    Have you been treated for these symptoms before:   [] Yes  [] No    If a prescription is needed, what is your preferred pharmacy and phone number: [facility] [id number] - [facility] [address] [facility] - [phone]  - [phone] FX     Additional notes: PATIENT CALLED HE HAD CORN ON TOES ON RIGHT FOOT WHICH CAME OFF AND NOW INFECTED, SWOLLEN, RED.  PATIENT WANTS TO KNOW IF DR MARROQUIN WILL CALL IN AN ANTIBIOTIC FOR HIM TODAY.  WANTS TO GET THE INFECTION UNDER CONTROL DUE TO HE IS GOING OUT OF TOWN TOMORROW.

## 2024-06-20 ENCOUNTER — OFFICE VISIT (OUTPATIENT)
Dept: FAMILY MEDICINE CLINIC | Facility: CLINIC | Age: 54
End: 2024-06-20
Payer: MEDICARE

## 2024-06-20 VITALS
HEIGHT: 75 IN | SYSTOLIC BLOOD PRESSURE: 104 MMHG | TEMPERATURE: 99.3 F | OXYGEN SATURATION: 97 % | HEART RATE: 112 BPM | BODY MASS INDEX: 39.17 KG/M2 | WEIGHT: 315 LBS | DIASTOLIC BLOOD PRESSURE: 62 MMHG

## 2024-06-20 DIAGNOSIS — L03.115 CELLULITIS OF RIGHT LOWER LEG: Primary | ICD-10-CM

## 2024-06-20 PROCEDURE — 3074F SYST BP LT 130 MM HG: CPT

## 2024-06-20 PROCEDURE — 3052F HG A1C>EQUAL 8.0%<EQUAL 9.0%: CPT

## 2024-06-20 PROCEDURE — 96372 THER/PROPH/DIAG INJ SC/IM: CPT

## 2024-06-20 PROCEDURE — 1126F AMNT PAIN NOTED NONE PRSNT: CPT

## 2024-06-20 PROCEDURE — 1160F RVW MEDS BY RX/DR IN RCRD: CPT

## 2024-06-20 PROCEDURE — 3078F DIAST BP <80 MM HG: CPT

## 2024-06-20 PROCEDURE — 1159F MED LIST DOCD IN RCRD: CPT

## 2024-06-20 PROCEDURE — 99214 OFFICE O/P EST MOD 30 MIN: CPT

## 2024-06-20 RX ORDER — CEPHALEXIN 500 MG/1
500 CAPSULE ORAL 4 TIMES DAILY
Qty: 28 CAPSULE | Refills: 0 | Status: SHIPPED | OUTPATIENT
Start: 2024-06-20 | End: 2024-06-27

## 2024-06-20 RX ORDER — CEFTRIAXONE 1 G/1
1 INJECTION, POWDER, FOR SOLUTION INTRAMUSCULAR; INTRAVENOUS ONCE
Status: COMPLETED | OUTPATIENT
Start: 2024-06-20 | End: 2024-06-20

## 2024-06-20 RX ADMIN — CEFTRIAXONE 1 G: 1 INJECTION, POWDER, FOR SOLUTION INTRAMUSCULAR; INTRAVENOUS at 14:43

## 2024-06-20 NOTE — PATIENT INSTRUCTIONS

## 2024-06-20 NOTE — PROGRESS NOTES
Subjective   Marcial Desai is a 54 y.o. male.     Chief Complaint   Patient presents with    cellulitis right leg     X 1.5 weeks     History of Present Illness     Cellulitis: Presents for cellulitis that began approximately 1 and half weeks ago.  He was sent in doxycycline on 6/14 and states that the redness did seem to improve.  He did take full course as prescribed.  He has also been applying Bactroban ointment as needed.  He denies chills.  He has baseline lymphedema and has had veins removed from his right upper leg.  He also has a chronic ulcer at the base of his great right toe.  States that his great toe has become red and swollen and his ulcer has been seeping.  He does have a history of gout but states that this does not feel like gout.  He is not in any pain.  States that his leg feels somewhat tight due to the swelling and will sometimes sting with palpation.    The following portions of the patient's history were reviewed and updated as appropriate: allergies, current medications, past family history, past medical history, past social history, past surgical history and problem list.    Review of Systems   Denies dizziness, fatigue, fever/chills, CP, SOA, headache, blood in urine/stool, abd pain, leg swelling.    Objective     Vitals:    06/20/24 1336   BP: 104/62   Pulse: 112   Temp: 99.3 °F (37.4 °C)   SpO2: 97%      Body mass index is 42.17 kg/m².    Physical Exam  Vitals reviewed.   Constitutional:       General: He is not in acute distress.     Appearance: Normal appearance. He is obese. He is not ill-appearing or toxic-appearing.   HENT:      Right Ear: External ear normal.      Left Ear: External ear normal.      Nose: No congestion or rhinorrhea.      Mouth/Throat:      Mouth: Mucous membranes are moist.      Pharynx: Oropharynx is clear.   Eyes:      Conjunctiva/sclera: Conjunctivae normal.   Cardiovascular:      Rate and Rhythm: Regular rhythm. Tachycardia present.      Pulses: Normal pulses.       Heart sounds: Normal heart sounds.   Pulmonary:      Effort: Pulmonary effort is normal. No respiratory distress.      Breath sounds: Normal breath sounds.   Abdominal:      General: Bowel sounds are normal.      Palpations: Abdomen is soft.      Tenderness: There is no abdominal tenderness.   Skin:     General: Skin is warm and dry.      Capillary Refill: Capillary refill takes less than 2 seconds.      Findings: Erythema present.      Comments: See images of chronic ulcer and cellulitis of RLL   Neurological:      General: No focal deficit present.      Mental Status: He is alert and oriented to person, place, and time.      Motor: No weakness.   Psychiatric:         Behavior: Behavior normal.         Assessment & Plan   Diagnoses and all orders for this visit:    1. Cellulitis of right lower leg (Primary)  -     mupirocin (BACTROBAN) 2 % ointment; Apply 1 Application topically to the appropriate area as directed 3 (Three) Times a Day As Needed (3 times daily as needed).  Dispense: 60 g; Refill: 1  -     Culture, Routine - Swab, Toe, Right  -     cefTRIAXone (ROCEPHIN) injection 1 g  -     cephalexin (KEFLEX) 500 MG capsule; Take 1 capsule by mouth 4 (Four) Times a Day for 7 days.  Dispense: 28 capsule; Refill: 0      Plan:     Wound culture performed.   Continue applying mupirocin as needed. Elevate as often as possible. Take prescribed Keflex.   Suspect unresolved cellulitis. Patient given strict return precautions.  Will contact in 1 week.   Follow up if symptoms worsen or fail to improve    Discussed Care Gaps, ordered referrals and encouraged vaccination updates.       - Pt agrees with plan of care and denies further questions/concerns today  - This document is intended for medical expert use only. Persons  reading this document without medical staff guidance may result in misinterpretation and unintended morbidity     Go to the ER for any possible life-threatening symptoms such as chest pain or shortness  of air.      Please allow 3-5 business days for recommendations based on new results      I personally spent time with this patient, preparing for the visit, reviewing tests, obtaining and/or reviewing a separately obtained history, performing a medically appropriate examination and/or evaluation, counseling and educating the patient/family/caregiver, ordering medications,  documenting information in the medical record and indepentently interpreting results.

## 2024-06-24 LAB
BACTERIA SPEC CULT: NORMAL
MICROORGANISM/AGENT SPEC: NORMAL

## 2024-07-10 DIAGNOSIS — M1A.09X0 IDIOPATHIC CHRONIC GOUT OF MULTIPLE SITES WITHOUT TOPHUS: ICD-10-CM

## 2024-07-10 DIAGNOSIS — I10 BENIGN ESSENTIAL HYPERTENSION: ICD-10-CM

## 2024-07-10 RX ORDER — ALLOPURINOL 100 MG/1
200 TABLET ORAL 2 TIMES DAILY
Qty: 360 TABLET | Refills: 3 | Status: SHIPPED | OUTPATIENT
Start: 2024-07-10

## 2024-07-25 ENCOUNTER — OFFICE VISIT (OUTPATIENT)
Dept: FAMILY MEDICINE CLINIC | Facility: CLINIC | Age: 54
End: 2024-07-25
Payer: MEDICARE

## 2024-07-25 VITALS
WEIGHT: 315 LBS | TEMPERATURE: 98.2 F | SYSTOLIC BLOOD PRESSURE: 106 MMHG | OXYGEN SATURATION: 96 % | HEART RATE: 109 BPM | HEIGHT: 75 IN | BODY MASS INDEX: 39.17 KG/M2 | DIASTOLIC BLOOD PRESSURE: 74 MMHG

## 2024-07-25 DIAGNOSIS — E11.621 DIABETIC ULCER OF TOE OF RIGHT FOOT ASSOCIATED WITH TYPE 2 DIABETES MELLITUS, WITH FAT LAYER EXPOSED: Primary | ICD-10-CM

## 2024-07-25 DIAGNOSIS — I87.2 VENOUS INSUFFICIENCY: ICD-10-CM

## 2024-07-25 DIAGNOSIS — L97.512 DIABETIC ULCER OF TOE OF RIGHT FOOT ASSOCIATED WITH TYPE 2 DIABETES MELLITUS, WITH FAT LAYER EXPOSED: Primary | ICD-10-CM

## 2024-07-25 DIAGNOSIS — L03.115 CELLULITIS OF RIGHT LOWER LEG: ICD-10-CM

## 2024-07-25 DIAGNOSIS — I89.0 LYMPHEDEMA: ICD-10-CM

## 2024-07-25 PROCEDURE — 99214 OFFICE O/P EST MOD 30 MIN: CPT | Performed by: FAMILY MEDICINE

## 2024-07-25 PROCEDURE — 3052F HG A1C>EQUAL 8.0%<EQUAL 9.0%: CPT | Performed by: FAMILY MEDICINE

## 2024-07-25 PROCEDURE — 1126F AMNT PAIN NOTED NONE PRSNT: CPT | Performed by: FAMILY MEDICINE

## 2024-07-25 PROCEDURE — 3078F DIAST BP <80 MM HG: CPT | Performed by: FAMILY MEDICINE

## 2024-07-25 PROCEDURE — 3074F SYST BP LT 130 MM HG: CPT | Performed by: FAMILY MEDICINE

## 2024-07-25 RX ORDER — DOXYCYCLINE HYCLATE 100 MG/1
100 CAPSULE ORAL 2 TIMES DAILY
Qty: 28 CAPSULE | Refills: 0 | Status: SHIPPED | OUTPATIENT
Start: 2024-07-25

## 2024-07-25 RX ORDER — SODIUM HYPOCHLORITE 2.5 MG/ML
SOLUTION TOPICAL
Qty: 473 ML | Refills: 0 | Status: SHIPPED | OUTPATIENT
Start: 2024-07-25 | End: 2024-08-05

## 2024-07-25 NOTE — PROGRESS NOTES
Subjective   Marcial Desai is a 54 y.o. male. Presents today for   Chief Complaint   Patient presents with    Cellulitis     Rt leg getting and drainage again       History of Present Illness  Patient 53 y/o male with severe lympedema and dm2 has right great toe ulceration and now redness/wamth coming up toe again;  was on abx for cellulitis nad improved, but worsneing again;  Wound has not healed.  No f/c;    Review of Systems   Constitutional:  Negative for chills and fever.   Skin:  Positive for rash and wound.       Patient Active Problem List   Diagnosis    Ankle pain    Atopic dermatitis    Benign essential hypertension    Type 2 diabetes mellitus with diabetic polyneuropathy, without long-term current use of insulin    Diabetic peripheral neuropathy    Abnormal liver function tests    Idiopathic chronic gout of multiple sites without tophus    Hypertriglyceridemia    Cramps of lower extremity    Edema of lower extremity    Lymphedema    Osteoarthritis    Morbid obesity due to excess calories    Hypogonadism in male    Ganglion cyst - left clavicle       Social History     Socioeconomic History    Marital status:    Tobacco Use    Smoking status: Never    Smokeless tobacco: Never   Vaping Use    Vaping status: Never Used   Substance and Sexual Activity    Alcohol use: No    Drug use: No    Sexual activity: Defer       Allergies   Allergen Reactions    Cortisone Shortness Of Breath       Current Outpatient Medications on File Prior to Visit   Medication Sig Dispense Refill    ACCU-CHEK TYREL PLUS test strip TEST DAILY 50 each 2    allopurinol (ZYLOPRIM) 100 MG tablet TAKE TWO TABLETS BY MOUTH TWICE A  tablet 3    ammonium lactate (Lac-Hydrin) 12 % cream Apply  topically to the appropriate area as directed As Needed for Dry Skin (venosu stasis dermatitis). 385 g 5    atorvastatin (LIPITOR) 20 MG tablet TAKE 1 TABLET BY MOUTH AT BEDTIME 90 tablet 0    bisoprolol (ZEBeta) 5 MG tablet Take 1 tablet by  mouth Daily. 90 tablet 1    Blood Glucose Monitoring Suppl (ACCU-CHEK TYREL PLUS) w/Device kit       buPROPion XL (WELLBUTRIN XL) 150 MG 24 hr tablet TAKE ONE TABLET BY MOUTH DAILY 90 tablet 3    cetirizine (zyrTEC) 10 MG tablet TAKE ONE TABLET BY MOUTH DAILY 90 tablet 3    cyclobenzaprine (FLEXERIL) 10 MG tablet Take 1 tablet by mouth 3 (Three) Times a Day As Needed for Muscle Spasms. 90 tablet 5    Dulaglutide 4.5 MG/0.5ML solution pen-injector Inject 0.5 mL under the skin into the appropriate area as directed 1 (One) Time Per Week. 2 mL 5    fluticasone (FLONASE) 50 MCG/ACT nasal spray 2 sprays by Each Nare route Daily. 48 g 3    furosemide (LASIX) 40 MG tablet TAKE ONE TABLET BY MOUTH DAILY 90 tablet 3    gabapentin (NEURONTIN) 400 MG capsule TAKE ONE CAPSULE BY MOUTH THREE TIMES A  capsule 0    glucose blood test strip Check once daily E11.9 non-iddm;  States [facility] brand covered;  Also disp alcohol swabs, lancets. 50 each 12    glucose monitor monitoring kit Check once daily d x E11.9 non-iddm 1 each 0    hydrocortisone 2.5 % cream Apply  topically 2 (two) times a day. Wipe bottom twice daily 28 g 0    indomethacin (INDOCIN) 25 MG capsule 1 po tid x 5 days, then 1 po bid x 5 days, 1 po qd x 5 days then stop 30 capsule 5    Jardiance 25 MG tablet tablet Take 1 tablet by mouth Daily.      Lancets misc 1 Device Daily. Dispense brand covered by insurance. Dx. e11.9 100 each 12    lisinopril (PRINIVIL,ZESTRIL) 10 MG tablet TAKE 1 TABLET BY MOUTH DAILY 90 tablet 1    magnesium oxide (MAGOX) 400 (241.3 Mg) MG tablet tablet TAKE ONE TABLET BY MOUTH DAILY 30 tablet 9    metFORMIN (GLUCOPHAGE) 1000 MG tablet TAKE 1 AND 1/2 TABLETS BY MOUTH IN THE MORNING  and 1 tablet in the evening 235 tablet 0    mupirocin (BACTROBAN) 2 % ointment Apply 1 Application topically to the appropriate area as directed 3 (Three) Times a Day As Needed (3 times daily as needed). 60 g 1    potassium chloride (K-DUR,KLOR-CON) 10 MEQ CR  "tablet TAKE THREE TABLETS BY MOUTH TWICE A DAY 54 tablet 0    sildenafil (VIAGRA) 100 MG tablet 1/2 to 1 po daily prn ED 10 tablet 5    Testosterone 20.25 MG/ACT (1.62%) gel APPLY 6 PUMPS DAILY TO EACH SHOULDERS OR INNER THIGHS 150 g 2    tiZANidine (ZANAFLEX) 4 MG tablet Take 1 tablet by mouth Every 8 (Eight) Hours As Needed for Muscle Spasms. 90 tablet 5    triamcinolone (KENALOG) 0.1 % cream Apply  topically to the appropriate area as directed 2 (Two) Times a Day. 30 g 2    urea (CARMOL) 40 % cream APPLY TO AFFECTED AREA(S) ON SECOND TOE DAILY AS DIRECTED 28.35 each 5    vitamin D (ERGOCALCIFEROL) 1.25 MG (66893 UT) capsule capsule Take 1 capsule by mouth 1 (One) Time Per Week. 12 capsule 3     Current Facility-Administered Medications on File Prior to Visit   Medication Dose Route Frequency Provider Last Rate Last Admin    Testosterone Cypionate (DEPOTESTOTERONE CYPIONATE) injection 100 mg  100 mg Intramuscular Q14 Days Tesfaye Swartz DO   100 mg at 06/25/19 1220       Objective   Vitals:    07/25/24 1526   BP: 106/74   Pulse: 109   Temp: 98.2 °F (36.8 °C)   SpO2: 96%   Weight: (!) 150 kg (331 lb 3.2 oz)   Height: 190.5 cm (75\")     Body mass index is 41.4 kg/m².    Physical Exam  Vitals and nursing note reviewed.   Constitutional:       Appearance: He is well-developed.   Musculoskeletal:      Cervical back: Neck supple.      Right lower leg: Edema present.      Left lower leg: Edema present.   Skin:     General: Skin is warm and dry.      Comments: Right great toe large ulcer to subq;  large portion necrotic tissue.  Toe, redness, swelling and warmth.   Neurological:      Mental Status: He is alert.   Psychiatric:         Behavior: Behavior normal.         Assessment & Plan   Diagnoses and all orders for this visit:    1. Diabetic ulcer of toe of right foot associated with type 2 diabetes mellitus, with fat layer exposed (Primary)  -     Ambulatory Referral to Podiatry  -     doxycycline (VIBRAMYCIN) 100 " MG capsule; Take 1 capsule by mouth 2 (Two) Times a Day.  Dispense: 28 capsule; Refill: 0  -     Discontinue: mupirocin (BACTROBAN) 2 % ointment; Apply 1 Application topically to the appropriate area as directed 2 (Two) Times a Day.  Dispense: 30 g; Refill: 2  -     sodium hypochlorite (DAKIN'S) 0.25 % topical solution; Rinse wound with dakins 15 ml twice daily, pat dry and apply bactroban  Dispense: 473 mL; Refill: 0    2. Lymphedema    3. Venous insufficiency    4. Cellulitis of right lower leg    Refer to podiatry  Start wound care as directed and abx.                 -Follow up: 4 weeks and prn

## 2024-07-26 DIAGNOSIS — M1A.09X0 IDIOPATHIC CHRONIC GOUT OF MULTIPLE SITES WITHOUT TOPHUS: ICD-10-CM

## 2024-07-26 RX ORDER — IBUPROFEN 800 MG/1
800 TABLET ORAL EVERY 8 HOURS PRN
Qty: 90 TABLET | Refills: 5 | Status: SHIPPED | OUTPATIENT
Start: 2024-07-26

## 2024-07-30 DIAGNOSIS — E11.621 DIABETIC ULCER OF TOE OF RIGHT FOOT ASSOCIATED WITH TYPE 2 DIABETES MELLITUS, WITH FAT LAYER EXPOSED: ICD-10-CM

## 2024-07-30 DIAGNOSIS — L97.512 DIABETIC ULCER OF TOE OF RIGHT FOOT ASSOCIATED WITH TYPE 2 DIABETES MELLITUS, WITH FAT LAYER EXPOSED: ICD-10-CM

## 2024-07-30 DIAGNOSIS — L03.115 CELLULITIS OF RIGHT LOWER LEG: ICD-10-CM

## 2024-08-04 DIAGNOSIS — L97.512 DIABETIC ULCER OF TOE OF RIGHT FOOT ASSOCIATED WITH TYPE 2 DIABETES MELLITUS, WITH FAT LAYER EXPOSED: ICD-10-CM

## 2024-08-04 DIAGNOSIS — E11.621 DIABETIC ULCER OF TOE OF RIGHT FOOT ASSOCIATED WITH TYPE 2 DIABETES MELLITUS, WITH FAT LAYER EXPOSED: ICD-10-CM

## 2024-08-05 RX ORDER — SODIUM HYPOCHLORITE 2.5 MG/ML
SOLUTION TOPICAL
Qty: 473 ML | Refills: 1 | Status: SHIPPED | OUTPATIENT
Start: 2024-08-05

## 2024-08-22 ENCOUNTER — TELEPHONE (OUTPATIENT)
Dept: FAMILY MEDICINE CLINIC | Facility: CLINIC | Age: 54
End: 2024-08-22
Payer: MEDICARE

## 2024-08-22 NOTE — TELEPHONE ENCOUNTER
Caller: Marcial Desai    Relationship to patient: Self    Best call back number: [phone]      Patient is needing: HE IS JUST GETTING HIS MRI DONE TODAY 8/22/24 AND IS SEEING THE FOOT DR IN 2 WEEKS.  DO YOU WANT TO RESCHEDULE HIS APPOINTMENT THAT HE HAD FOR 8/27/24 FOR 3 WEEKS OUT?  PLEASE CALL AND ADVISE.

## 2024-08-23 DIAGNOSIS — F32.2 SEVERE SINGLE CURRENT EPISODE OF MAJOR DEPRESSIVE DISORDER, WITHOUT PSYCHOTIC FEATURES: ICD-10-CM

## 2024-08-23 DIAGNOSIS — R60.0 EDEMA OF LOWER EXTREMITY: ICD-10-CM

## 2024-08-23 RX ORDER — FUROSEMIDE 40 MG/1
40 TABLET ORAL DAILY
Qty: 90 TABLET | Refills: 3 | Status: SHIPPED | OUTPATIENT
Start: 2024-08-23

## 2024-08-23 RX ORDER — BUPROPION HYDROCHLORIDE 150 MG/1
150 TABLET ORAL DAILY
Qty: 90 TABLET | Refills: 3 | Status: SHIPPED | OUTPATIENT
Start: 2024-08-23

## 2024-09-12 ENCOUNTER — OFFICE VISIT (OUTPATIENT)
Dept: FAMILY MEDICINE CLINIC | Facility: CLINIC | Age: 54
End: 2024-09-12
Payer: MEDICARE

## 2024-09-12 VITALS
DIASTOLIC BLOOD PRESSURE: 80 MMHG | WEIGHT: 315 LBS | BODY MASS INDEX: 39.17 KG/M2 | OXYGEN SATURATION: 97 % | HEART RATE: 88 BPM | HEIGHT: 75 IN | SYSTOLIC BLOOD PRESSURE: 122 MMHG

## 2024-09-12 DIAGNOSIS — M1A.09X0 IDIOPATHIC CHRONIC GOUT OF MULTIPLE SITES WITHOUT TOPHUS: ICD-10-CM

## 2024-09-12 DIAGNOSIS — E55.9 VITAMIN D DEFICIENCY: ICD-10-CM

## 2024-09-12 DIAGNOSIS — L97.512 DIABETIC ULCER OF TOE OF RIGHT FOOT ASSOCIATED WITH TYPE 2 DIABETES MELLITUS, WITH FAT LAYER EXPOSED: ICD-10-CM

## 2024-09-12 DIAGNOSIS — N40.0 BENIGN PROSTATIC HYPERPLASIA WITHOUT LOWER URINARY TRACT SYMPTOMS: ICD-10-CM

## 2024-09-12 DIAGNOSIS — I87.2 VENOUS INSUFFICIENCY: ICD-10-CM

## 2024-09-12 DIAGNOSIS — I89.0 LYMPHEDEMA: ICD-10-CM

## 2024-09-12 DIAGNOSIS — M86.68 OTHER CHRONIC OSTEOMYELITIS, OTHER SITE: ICD-10-CM

## 2024-09-12 DIAGNOSIS — E11.42 TYPE 2 DIABETES MELLITUS WITH DIABETIC POLYNEUROPATHY, WITHOUT LONG-TERM CURRENT USE OF INSULIN: Primary | ICD-10-CM

## 2024-09-12 DIAGNOSIS — I10 BENIGN ESSENTIAL HYPERTENSION: ICD-10-CM

## 2024-09-12 DIAGNOSIS — E11.42 DIABETIC PERIPHERAL NEUROPATHY: ICD-10-CM

## 2024-09-12 DIAGNOSIS — E78.1 HYPERTRIGLYCERIDEMIA: ICD-10-CM

## 2024-09-12 DIAGNOSIS — E29.1 HYPOGONADISM IN MALE: ICD-10-CM

## 2024-09-12 DIAGNOSIS — E11.621 DIABETIC ULCER OF TOE OF RIGHT FOOT ASSOCIATED WITH TYPE 2 DIABETES MELLITUS, WITH FAT LAYER EXPOSED: ICD-10-CM

## 2024-09-12 PROCEDURE — 3079F DIAST BP 80-89 MM HG: CPT | Performed by: FAMILY MEDICINE

## 2024-09-12 PROCEDURE — 1126F AMNT PAIN NOTED NONE PRSNT: CPT | Performed by: FAMILY MEDICINE

## 2024-09-12 PROCEDURE — 3052F HG A1C>EQUAL 8.0%<EQUAL 9.0%: CPT | Performed by: FAMILY MEDICINE

## 2024-09-12 PROCEDURE — 99214 OFFICE O/P EST MOD 30 MIN: CPT | Performed by: FAMILY MEDICINE

## 2024-09-12 PROCEDURE — 3074F SYST BP LT 130 MM HG: CPT | Performed by: FAMILY MEDICINE

## 2024-09-12 RX ORDER — LEVOFLOXACIN 750 MG/1
750 TABLET, FILM COATED ORAL DAILY
COMMUNITY
Start: 2024-09-06

## 2024-09-12 RX ORDER — EMPAGLIFLOZIN AND LINAGLIPTIN 25; 5 MG/1; MG/1
1 TABLET, FILM COATED ORAL DAILY
Start: 2024-09-12

## 2024-09-12 RX ORDER — SODIUM HYPOCHLORITE 5 MG/ML
SOLUTION TOPICAL 2 TIMES DAILY
Qty: 473 ML | Refills: 5 | Status: SHIPPED | OUTPATIENT
Start: 2024-09-12

## 2024-09-12 NOTE — PROGRESS NOTES
Subjective   Marcial Desai is a 54 y.o. male. Presents today for   Chief Complaint   Patient presents with    Diabetes    Hypertension    Gout    Wound Check    Hyperlipidemia    Hypogonadism    Leg Swelling     Answers submitted by the patient for this visit:  Primary Reason for Visit (Submitted on 9/5/2024)  What is the primary reason for your visit?: Other  Other (Submitted on 9/5/2024)  Please describe your symptoms.: Ulcer on foot snd results of blood work  Have you had these symptoms before?: Yes  How long have you been having these symptoms?: Greater than 2 weeks  Please list any medications you are currently taking for this condition.: Antibiotics  Please describe any probable cause for these symptoms. : Blood blister came off and caused this    Patient 53 y/o male with dm2, htn, hld, hx of gout, hld, diabetic neuropathy and severe lymphedema and venous insufficiency.   He has right great toe ulceration;   distal phalanx of toe completely avulsed and looks liek osteomyelitis on MRI (see below).  Podiatry managing, placed on levaquin high dose.   He stepped wrong and wound opened up some.  Is doing bid dressing changes per podiatry and needs full strength dakins refill.   No cp/soa;    Diabetes  Pertinent negatives for diabetes include no chest pain.   Hypertension  Pertinent negatives include no chest pain or palpitations.   Gout  Pertinent negatives include no chest pain, chills, fever or rash.   Wound Check    Hyperlipidemia  Pertinent negatives include no chest pain.   Leg Swelling  Pertinent negatives include no chest pain, chills, fever or rash.       Review of Systems   Constitutional:  Negative for chills and fever.   Cardiovascular:  Positive for leg swelling. Negative for chest pain and palpitations.   Musculoskeletal:  Positive for gout.   Skin:  Positive for wound. Negative for rash.       Patient Active Problem List   Diagnosis    Ankle pain    Atopic dermatitis    Benign essential hypertension     Type 2 diabetes mellitus with diabetic polyneuropathy, without long-term current use of insulin    Diabetic peripheral neuropathy    Abnormal liver function tests    Idiopathic chronic gout of multiple sites without tophus    Hypertriglyceridemia    Cramps of lower extremity    Edema of lower extremity    Lymphedema    Osteoarthritis    Morbid obesity due to excess calories    Hypogonadism in male    Ganglion cyst - left clavicle       Social History     Socioeconomic History    Marital status:    Tobacco Use    Smoking status: Never    Smokeless tobacco: Never   Vaping Use    Vaping status: Never Used   Substance and Sexual Activity    Alcohol use: No    Drug use: No    Sexual activity: Defer       Allergies   Allergen Reactions    Cortisone Shortness Of Breath       Current Outpatient Medications on File Prior to Visit   Medication Sig Dispense Refill    ACCU-CHEK TYREL PLUS test strip TEST DAILY 50 each 2    allopurinol (ZYLOPRIM) 100 MG tablet TAKE TWO TABLETS BY MOUTH TWICE A  tablet 3    ammonium lactate (Lac-Hydrin) 12 % cream Apply  topically to the appropriate area as directed As Needed for Dry Skin (venosu stasis dermatitis). 385 g 5    atorvastatin (LIPITOR) 20 MG tablet TAKE 1 TABLET BY MOUTH AT BEDTIME 90 tablet 0    bisoprolol (ZEBeta) 5 MG tablet Take 1 tablet by mouth Daily. 90 tablet 1    Blood Glucose Monitoring Suppl (ACCU-CHEK TYREL PLUS) w/Device kit       buPROPion XL (WELLBUTRIN XL) 150 MG 24 hr tablet TAKE 1 TABLET BY MOUTH DAILY 90 tablet 3    cetirizine (zyrTEC) 10 MG tablet TAKE ONE TABLET BY MOUTH DAILY 90 tablet 3    cyclobenzaprine (FLEXERIL) 10 MG tablet Take 1 tablet by mouth 3 (Three) Times a Day As Needed for Muscle Spasms. 90 tablet 5    furosemide (LASIX) 40 MG tablet TAKE 1 TABLET BY MOUTH DAILY 90 tablet 3    gabapentin (NEURONTIN) 400 MG capsule TAKE ONE CAPSULE BY MOUTH THREE TIMES A  capsule 0    hydrocortisone 2.5 % cream Apply  topically 2 (two) times a  day. Wipe bottom twice daily 28 g 0    ibuprofen (ADVIL,MOTRIN) 800 MG tablet TAKE ONE TABLET BY MOUTH EVERY 8 HOURS AS NEEDED FOR MILD PAIN 90 tablet 5    Lancets misc 1 Device Daily. Dispense brand covered by insurance. Dx. e11.9 100 each 12    levoFLOXacin (LEVAQUIN) 750 MG tablet Take 1 tablet by mouth Daily.      lisinopril (PRINIVIL,ZESTRIL) 10 MG tablet TAKE 1 TABLET BY MOUTH DAILY 90 tablet 1    magnesium oxide (MAGOX) 400 (241.3 Mg) MG tablet tablet TAKE ONE TABLET BY MOUTH DAILY 30 tablet 9    metFORMIN (GLUCOPHAGE) 1000 MG tablet TAKE 1 AND 1/2 TABLETS BY MOUTH IN THE MORNING  and 1 tablet in the evening 235 tablet 0    mupirocin (BACTROBAN) 2 % ointment Apply 1 Application topically to the appropriate area as directed 3 (Three) Times a Day As Needed (3 times daily as needed). 60 g 1    potassium chloride (K-DUR,KLOR-CON) 10 MEQ CR tablet TAKE THREE TABLETS BY MOUTH TWICE A DAY 54 tablet 0    Testosterone 20.25 MG/ACT (1.62%) gel APPLY 6 PUMPS DAILY TO EACH SHOULDERS OR INNER THIGHS 150 g 2    triamcinolone (KENALOG) 0.1 % cream Apply  topically to the appropriate area as directed 2 (Two) Times a Day. 30 g 2    urea (CARMOL) 40 % cream APPLY TO AFFECTED AREA(S) ON SECOND TOE DAILY AS DIRECTED 28.35 each 5    vitamin D (ERGOCALCIFEROL) 1.25 MG (58184 UT) capsule capsule Take 1 capsule by mouth 1 (One) Time Per Week. 12 capsule 3    [DISCONTINUED] sodium hypochlorite (DAKIN'S) 0.25 % topical solution RINSE WOUND WITH 15 MILLILITERS OF DAKINS SOLUTION DAILY. PAT DRY AND APPLY BACTROBAN 473 mL 1    sildenafil (VIAGRA) 100 MG tablet 1/2 to 1 po daily prn ED (Patient not taking: Reported on 9/12/2024) 10 tablet 5    [DISCONTINUED] doxycycline (VIBRAMYCIN) 100 MG capsule Take 1 capsule by mouth 2 (Two) Times a Day. (Patient not taking: Reported on 9/12/2024) 28 capsule 0    [DISCONTINUED] Dulaglutide 4.5 MG/0.5ML solution pen-injector Inject 0.5 mL under the skin into the appropriate area as directed 1 (One)  "Time Per Week. (Patient not taking: Reported on 9/12/2024) 2 mL 5    [DISCONTINUED] fluticasone (FLONASE) 50 MCG/ACT nasal spray 2 sprays by Each Nare route Daily. (Patient not taking: Reported on 9/12/2024) 48 g 3    [DISCONTINUED] glucose blood test strip Check once daily E11.9 non-iddm;  States [employer] brand covered;  Also disp alcohol swabs, lancets. (Patient not taking: Reported on 9/12/2024) 50 each 12    [DISCONTINUED] glucose monitor monitoring kit Check once daily d x E11.9 non-iddm (Patient not taking: Reported on 9/12/2024) 1 each 0    [DISCONTINUED] indomethacin (INDOCIN) 25 MG capsule 1 po tid x 5 days, then 1 po bid x 5 days, 1 po qd x 5 days then stop (Patient not taking: Reported on 9/12/2024) 30 capsule 5    [DISCONTINUED] Jardiance 25 MG tablet tablet Take 1 tablet by mouth Daily. (Patient not taking: Reported on 9/12/2024)      [DISCONTINUED] mupirocin (BACTROBAN) 2 % ointment Apply 1 Application topically to the appropriate area as directed 2 (Two) Times a Day. 30 g 2    [DISCONTINUED] tiZANidine (ZANAFLEX) 4 MG tablet Take 1 tablet by mouth Every 8 (Eight) Hours As Needed for Muscle Spasms. (Patient not taking: Reported on 9/12/2024) 90 tablet 5     Current Facility-Administered Medications on File Prior to Visit   Medication Dose Route Frequency Provider Last Rate Last Admin    Testosterone Cypionate (DEPOTESTOTERONE CYPIONATE) injection 100 mg  100 mg Intramuscular Q14 Days Tesfaye Swartz DO   100 mg at 06/25/19 1220       Objective   Vitals:    09/12/24 0828   BP: 122/80   Pulse: 88   SpO2: 97%   Weight: (!) 152 kg (334 lb)   Height: 190.5 cm (75\")     Body mass index is 41.75 kg/m².    Physical Exam  Vitals and nursing note reviewed.   Constitutional:       Appearance: He is well-developed.   Musculoskeletal:      Cervical back: Neck supple.      Right foot: Deformity present.        Feet:    Feet:      Right foot:      Skin integrity: Ulcer and skin breakdown present. No erythema. "   Skin:     General: Skin is warm and dry.   Neurological:      Mental Status: He is alert.   Psychiatric:         Behavior: Behavior normal.                       Assessment & Plan   Diagnoses and all orders for this visit:    1. Type 2 diabetes mellitus with diabetic polyneuropathy, without long-term current use of insulin (Primary)  -     Comprehensive Metabolic Panel  -     Lipid Panel  -     Hemoglobin A1c  -     TSH  -     Empagliflozin-linaGLIPtin (Glyxambi) 25-5 MG tablet; Take 1 tablet by mouth Daily.    2. Idiopathic chronic gout of multiple sites without tophus  -     Comprehensive Metabolic Panel  -     Uric Acid    3. Diabetic peripheral neuropathy    4. Vitamin D deficiency  -     Vitamin D,25-Hydroxy    5. Benign prostatic hyperplasia without lower urinary tract symptoms  -     PSA DIAGNOSTIC    6. Hypogonadism in male  -     Comprehensive Metabolic Panel  -     CBC (No Diff)  -     Testosterone    7. Hypertriglyceridemia  -     Lipid Panel    8. Benign essential hypertension    9. Lymphedema    10. Venous insufficiency    11. Diabetic ulcer of toe of right foot associated with type 2 diabetes mellitus, with fat layer exposed  -     CBC (No Diff)  -     Sedimentation Rate  -     Dakins, full strength, (Dakins full strength) 0.5 % external solution; Apply  topically to the appropriate area as directed 2 (Two) Times a Day.  Dispense: 473 mL; Refill: 5    12. Other chronic osteomyelitis, other site Right great toe      Gave applications for patient assistance for Ozempic and Jardiance;   off jardiance and trulicity due to costs.     While awaiting decisions I gave 6 weeks of glyxambi to take which will stop once able get ozempic/jardiance.   GLP1 would be ideal as can lose weight and needs to do so.  Due full hypogonadism labs  -HLD - continue statin, recheck lipids.  -due check vitamin D  See podiatry for wound;  I applied silver ag dressing then sterile dressing coverage along with abx ointment                   -Follow up: 10/29/24

## 2024-09-17 RX ORDER — BISOPROLOL FUMARATE 5 MG/1
5 TABLET, FILM COATED ORAL DAILY
Qty: 90 TABLET | Refills: 3 | Status: SHIPPED | OUTPATIENT
Start: 2024-09-17

## 2024-09-21 DIAGNOSIS — E11.42 TYPE 2 DIABETES MELLITUS WITH DIABETIC POLYNEUROPATHY, WITHOUT LONG-TERM CURRENT USE OF INSULIN: ICD-10-CM

## 2024-09-21 DIAGNOSIS — E78.1 HYPERTRIGLYCERIDEMIA: ICD-10-CM

## 2024-09-23 RX ORDER — ATORVASTATIN CALCIUM 20 MG/1
TABLET, FILM COATED ORAL
Qty: 90 TABLET | Refills: 0 | Status: SHIPPED | OUTPATIENT
Start: 2024-09-23

## 2024-09-25 DIAGNOSIS — E11.42 TYPE 2 DIABETES MELLITUS WITH DIABETIC POLYNEUROPATHY, WITHOUT LONG-TERM CURRENT USE OF INSULIN: ICD-10-CM

## 2024-10-17 ENCOUNTER — TELEPHONE (OUTPATIENT)
Dept: FAMILY MEDICINE CLINIC | Facility: CLINIC | Age: 54
End: 2024-10-17

## 2024-10-17 DIAGNOSIS — E29.1 HYPOGONADISM IN MALE: ICD-10-CM

## 2024-10-17 DIAGNOSIS — E11.42 DIABETIC PERIPHERAL NEUROPATHY: ICD-10-CM

## 2024-10-17 DIAGNOSIS — E11.42 TYPE 2 DIABETES MELLITUS WITH DIABETIC POLYNEUROPATHY, WITHOUT LONG-TERM CURRENT USE OF INSULIN: Primary | ICD-10-CM

## 2024-10-17 RX ORDER — GABAPENTIN 400 MG/1
400 CAPSULE ORAL 3 TIMES DAILY
Qty: 270 CAPSULE | Refills: 0 | Status: SHIPPED | OUTPATIENT
Start: 2024-10-17

## 2024-10-17 RX ORDER — TESTOSTERONE 1.62 MG/G
GEL TRANSDERMAL
Qty: 150 G | Refills: 2 | Status: SHIPPED | OUTPATIENT
Start: 2024-10-17

## 2024-10-17 RX ORDER — DULAGLUTIDE 0.75 MG/.5ML
0.75 INJECTION, SOLUTION SUBCUTANEOUS WEEKLY
Qty: 6 ML | Refills: 1 | Status: SHIPPED | OUTPATIENT
Start: 2024-10-17

## 2024-10-17 NOTE — TELEPHONE ENCOUNTER
Caller: [facility]    Best call back number: [phone]    Patient is needing:     PATIENT IS ASKING FOR A TIER EXCEPTION TO BE REQUESTED FOR:       empagliflozin (JARDIANCE) 25 MG tablet tablet   CASE NUMBER: [id number]    AND ALSO   Dulaglutide (Trulicity) 0.75 MG/0.5ML solution pen-injector   CASE NUMBER: [id number]    ASKING FOR A CALLBACK TO GO OVER CLINICAL CRITERIA QUESTIONS.

## 2024-10-17 NOTE — TELEPHONE ENCOUNTER
Caller: Marcial Desai    Relationship: Self    Best call back number: [phone]    What medication are you requestin Times Daily             gabapentin (NEURONTIN) 400 MG capsule           JARDIANCE TRULICITY  Testosterone 20.25 MG/ACT (1.62%) gel   What are your current symptoms:     How long have you been experiencing symptoms:     Have you had these symptoms before:    [] Yes  [] No    Have you been treated for these symptoms before:   [] Yes  [] No    If a prescription is needed, what is your preferred pharmacy and phone number: [facility] [id number] - [address] & [facility] - [phone]  - [phone] FX     Additional notes: PATIENT IS CALLING IN TO REQUEST REFILLS ON THE FOUR MEDICATIONS LISTED ABOVE BUT THEY WERE NOT PRESCRIBED BY DR MARROQUIN HE NEEDS A 90 DAY SUPPLY FOR ALL MEDICATIONS

## 2024-10-24 NOTE — TELEPHONE ENCOUNTER
I called to do the tier exception and was told it was already denied without clinical questions even being answered. Now I am being told that an appeal letter will need to be done and faxed to [phone].

## 2024-10-25 LAB
25(OH)D3+25(OH)D2 SERPL-MCNC: 23.6 NG/ML (ref 30–100)
ALBUMIN SERPL-MCNC: 4.2 G/DL (ref 3.5–5.2)
ALBUMIN/GLOB SERPL: 1.4 G/DL
ALP SERPL-CCNC: 107 U/L (ref 39–117)
ALT SERPL-CCNC: 14 U/L (ref 1–41)
AST SERPL-CCNC: 20 U/L (ref 1–40)
BILIRUB SERPL-MCNC: 0.4 MG/DL (ref 0–1.2)
BUN SERPL-MCNC: 13 MG/DL (ref 6–20)
BUN/CREAT SERPL: 13 (ref 7–25)
CALCIUM SERPL-MCNC: 9.7 MG/DL (ref 8.6–10.5)
CHLORIDE SERPL-SCNC: 98 MMOL/L (ref 98–107)
CHOLEST SERPL-MCNC: 112 MG/DL (ref 0–200)
CO2 SERPL-SCNC: 25.4 MMOL/L (ref 22–29)
CREAT SERPL-MCNC: 1 MG/DL (ref 0.76–1.27)
EGFRCR SERPLBLD CKD-EPI 2021: 90 ML/MIN/1.73
ERYTHROCYTE [DISTWIDTH] IN BLOOD BY AUTOMATED COUNT: 13.3 % (ref 12.3–15.4)
ERYTHROCYTE [SEDIMENTATION RATE] IN BLOOD BY WESTERGREN METHOD: 4 MM/HR (ref 0–20)
GLOBULIN SER CALC-MCNC: 2.9 GM/DL
GLUCOSE SERPL-MCNC: 149 MG/DL (ref 65–99)
HBA1C MFR BLD: 7.9 % (ref 4.8–5.6)
HCT VFR BLD AUTO: 44.1 % (ref 37.5–51)
HDLC SERPL-MCNC: 34 MG/DL (ref 40–60)
HGB BLD-MCNC: 14.5 G/DL (ref 13–17.7)
LDLC SERPL CALC-MCNC: 46 MG/DL (ref 0–100)
MCH RBC QN AUTO: 28.3 PG (ref 26.6–33)
MCHC RBC AUTO-ENTMCNC: 32.9 G/DL (ref 31.5–35.7)
MCV RBC AUTO: 86 FL (ref 79–97)
PLATELET # BLD AUTO: 163 10*3/MM3 (ref 140–450)
POTASSIUM SERPL-SCNC: 4.2 MMOL/L (ref 3.5–5.2)
PROT SERPL-MCNC: 7.1 G/DL (ref 6–8.5)
PSA SERPL-MCNC: 0.29 NG/ML (ref 0–4)
RBC # BLD AUTO: 5.13 10*6/MM3 (ref 4.14–5.8)
SODIUM SERPL-SCNC: 138 MMOL/L (ref 136–145)
TESTOST SERPL-MCNC: 257 NG/DL (ref 264–916)
TRIGL SERPL-MCNC: 200 MG/DL (ref 0–150)
TSH SERPL DL<=0.005 MIU/L-ACNC: 2.05 UIU/ML (ref 0.27–4.2)
URATE SERPL-MCNC: 5.2 MG/DL (ref 3.4–7)
VLDLC SERPL CALC-MCNC: 32 MG/DL (ref 5–40)
WBC # BLD AUTO: 5.77 10*3/MM3 (ref 3.4–10.8)

## 2024-11-13 DIAGNOSIS — E11.42 TYPE 2 DIABETES MELLITUS WITH DIABETIC POLYNEUROPATHY, WITHOUT LONG-TERM CURRENT USE OF INSULIN: ICD-10-CM
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
## 2024-11-22 RX ORDER — LISINOPRIL 10 MG/1
10 TABLET ORAL DAILY
Qty: 90 TABLET | Refills: 3 | Status: SHIPPED | OUTPATIENT
Start: 2024-11-22

## 2024-12-12 DIAGNOSIS — E11.42 DIABETIC PERIPHERAL NEUROPATHY: ICD-10-CM

## 2024-12-12 DIAGNOSIS — L84 PRE-ULCERATIVE CORN OR CALLOUS: ICD-10-CM

## 2024-12-12 RX ORDER — UREA 40 %
CREAM (GRAM) TOPICAL
Qty: 28.35 G | Refills: 0 | Status: SHIPPED | OUTPATIENT
Start: 2024-12-12

## 2024-12-12 RX ORDER — GABAPENTIN 400 MG/1
400 CAPSULE ORAL 3 TIMES DAILY
Qty: 270 CAPSULE | Refills: 1 | Status: SHIPPED | OUTPATIENT
Start: 2024-12-12

## 2024-12-18 DIAGNOSIS — E78.1 HYPERTRIGLYCERIDEMIA: ICD-10-CM

## 2024-12-18 DIAGNOSIS — E11.42 TYPE 2 DIABETES MELLITUS WITH DIABETIC POLYNEUROPATHY, WITHOUT LONG-TERM CURRENT USE OF INSULIN: ICD-10-CM

## 2024-12-18 RX ORDER — ATORVASTATIN CALCIUM 20 MG/1
TABLET, FILM COATED ORAL
Qty: 90 TABLET | Refills: 0 | Status: SHIPPED | OUTPATIENT
Start: 2024-12-18

## 2024-12-26 DIAGNOSIS — E11.42 TYPE 2 DIABETES MELLITUS WITH DIABETIC POLYNEUROPATHY, WITHOUT LONG-TERM CURRENT USE OF INSULIN: ICD-10-CM

## 2025-01-29 ENCOUNTER — OFFICE VISIT (OUTPATIENT)
Dept: FAMILY MEDICINE CLINIC | Facility: CLINIC | Age: 55
End: 2025-01-29
Payer: MEDICARE

## 2025-01-29 VITALS
SYSTOLIC BLOOD PRESSURE: 132 MMHG | BODY MASS INDEX: 39.17 KG/M2 | DIASTOLIC BLOOD PRESSURE: 72 MMHG | OXYGEN SATURATION: 97 % | WEIGHT: 315 LBS | HEIGHT: 75 IN | HEART RATE: 89 BPM

## 2025-01-29 DIAGNOSIS — I10 BENIGN ESSENTIAL HYPERTENSION: ICD-10-CM

## 2025-01-29 DIAGNOSIS — J30.1 SEASONAL ALLERGIC RHINITIS DUE TO POLLEN: ICD-10-CM

## 2025-01-29 DIAGNOSIS — I87.2 VENOUS INSUFFICIENCY: ICD-10-CM

## 2025-01-29 DIAGNOSIS — Z79.899 DRUG THERAPY: ICD-10-CM

## 2025-01-29 DIAGNOSIS — E11.42 DIABETIC PERIPHERAL NEUROPATHY: ICD-10-CM

## 2025-01-29 DIAGNOSIS — M1A.09X0 IDIOPATHIC CHRONIC GOUT OF MULTIPLE SITES WITHOUT TOPHUS: ICD-10-CM

## 2025-01-29 DIAGNOSIS — E11.42 TYPE 2 DIABETES MELLITUS WITH DIABETIC POLYNEUROPATHY, WITHOUT LONG-TERM CURRENT USE OF INSULIN: Primary | ICD-10-CM

## 2025-01-29 DIAGNOSIS — I89.0 LYMPHEDEMA: ICD-10-CM

## 2025-01-29 DIAGNOSIS — E78.1 HYPERTRIGLYCERIDEMIA: ICD-10-CM

## 2025-01-29 DIAGNOSIS — N40.0 BENIGN PROSTATIC HYPERPLASIA WITHOUT LOWER URINARY TRACT SYMPTOMS: ICD-10-CM

## 2025-01-29 DIAGNOSIS — R60.0 EDEMA OF LOWER EXTREMITY: ICD-10-CM

## 2025-01-29 DIAGNOSIS — F32.2 SEVERE SINGLE CURRENT EPISODE OF MAJOR DEPRESSIVE DISORDER, WITHOUT PSYCHOTIC FEATURES: ICD-10-CM

## 2025-01-29 DIAGNOSIS — L84 PRE-ULCERATIVE CORN OR CALLOUS: ICD-10-CM

## 2025-01-29 DIAGNOSIS — E29.1 HYPOGONADISM IN MALE: ICD-10-CM

## 2025-01-29 DIAGNOSIS — E55.9 VITAMIN D DEFICIENCY: ICD-10-CM

## 2025-01-29 PROCEDURE — 3078F DIAST BP <80 MM HG: CPT | Performed by: FAMILY MEDICINE

## 2025-01-29 PROCEDURE — 1126F AMNT PAIN NOTED NONE PRSNT: CPT | Performed by: FAMILY MEDICINE

## 2025-01-29 PROCEDURE — 3075F SYST BP GE 130 - 139MM HG: CPT | Performed by: FAMILY MEDICINE

## 2025-01-29 PROCEDURE — 99214 OFFICE O/P EST MOD 30 MIN: CPT | Performed by: FAMILY MEDICINE

## 2025-01-29 RX ORDER — CYCLOBENZAPRINE HCL 10 MG
10 TABLET ORAL 3 TIMES DAILY PRN
Qty: 90 TABLET | Refills: 5 | Status: SHIPPED | OUTPATIENT
Start: 2025-01-29

## 2025-01-29 RX ORDER — IBUPROFEN 800 MG/1
800 TABLET, FILM COATED ORAL EVERY 8 HOURS PRN
Qty: 90 TABLET | Refills: 5 | Status: SHIPPED | OUTPATIENT
Start: 2025-01-29

## 2025-01-29 RX ORDER — GABAPENTIN 400 MG/1
400 CAPSULE ORAL 3 TIMES DAILY
Qty: 270 CAPSULE | Refills: 1 | Status: SHIPPED | OUTPATIENT
Start: 2025-01-29

## 2025-01-29 RX ORDER — FUROSEMIDE 40 MG/1
40 TABLET ORAL DAILY
Qty: 90 TABLET | Refills: 3 | Status: SHIPPED | OUTPATIENT
Start: 2025-01-29

## 2025-01-29 RX ORDER — ALLOPURINOL 100 MG/1
200 TABLET ORAL 2 TIMES DAILY
Qty: 360 TABLET | Refills: 3 | Status: SHIPPED | OUTPATIENT
Start: 2025-01-29

## 2025-01-29 RX ORDER — BISOPROLOL FUMARATE 5 MG/1
5 TABLET, FILM COATED ORAL DAILY
Qty: 90 TABLET | Refills: 3 | Status: SHIPPED | OUTPATIENT
Start: 2025-01-29

## 2025-01-29 RX ORDER — BUPROPION HYDROCHLORIDE 150 MG/1
150 TABLET ORAL DAILY
Qty: 90 TABLET | Refills: 3 | Status: SHIPPED | OUTPATIENT
Start: 2025-01-29

## 2025-01-29 RX ORDER — TESTOSTERONE 1.62 MG/G
GEL TRANSDERMAL
Qty: 150 G | Refills: 2 | Status: SHIPPED | OUTPATIENT
Start: 2025-01-29

## 2025-01-29 RX ORDER — LISINOPRIL 10 MG/1
10 TABLET ORAL DAILY
Qty: 90 TABLET | Refills: 3 | Status: SHIPPED | OUTPATIENT
Start: 2025-01-29

## 2025-01-29 RX ORDER — POTASSIUM CHLORIDE 750 MG/1
10 TABLET, EXTENDED RELEASE ORAL DAILY
Qty: 90 TABLET | Refills: 3 | Status: SHIPPED | OUTPATIENT
Start: 2025-01-29

## 2025-01-29 RX ORDER — UREA 40 %
CREAM (GRAM) TOPICAL
Qty: 28.35 G | Refills: 12 | Status: SHIPPED | OUTPATIENT
Start: 2025-01-29

## 2025-01-29 RX ORDER — CETIRIZINE HYDROCHLORIDE 10 MG/1
10 TABLET ORAL DAILY
Qty: 90 TABLET | Refills: 3 | Status: SHIPPED | OUTPATIENT
Start: 2025-01-29

## 2025-01-29 RX ORDER — ERGOCALCIFEROL 1.25 MG/1
50000 CAPSULE, LIQUID FILLED ORAL WEEKLY
Qty: 12 CAPSULE | Refills: 3 | Status: SHIPPED | OUTPATIENT
Start: 2025-01-29 | End: 2025-02-03 | Stop reason: DRUGHIGH

## 2025-01-29 NOTE — PROGRESS NOTES
Subjective   Marcial Desai is a 54 y.o. male. Presents today for   Chief Complaint   Patient presents with    Diabetes    Hypertension    Gout    Hyperlipidemia    Hypogonadism    Wound Check     Right foot       History of Present Illness  Patient 55 y/o dm2, htn, hld, gout, hypogonadism and right foot wound (great toe) non-healing reports will need skin grafts vs amputation;  Either way challenging as expensive and not fully covered. No cp/soa; severe lymphedema/venous insufficiency, using compression wraps and elevation;  no gout attacks reported;  needs refills and due labs;  Off zyrtec, but having allergy symptoms  Review of Systems   Respiratory:  Negative for shortness of breath.    Cardiovascular:  Positive for leg swelling. Negative for chest pain and palpitations.   Skin:  Positive for wound.       Patient Active Problem List   Diagnosis    Ankle pain    Atopic dermatitis    Benign essential hypertension    Type 2 diabetes mellitus with diabetic polyneuropathy, without long-term current use of insulin    Diabetic peripheral neuropathy    Abnormal liver function tests    Idiopathic chronic gout of multiple sites without tophus    Hypertriglyceridemia    Cramps of lower extremity    Edema of lower extremity    Lymphedema    Osteoarthritis    Morbid obesity due to excess calories    Hypogonadism in male    Ganglion cyst - left clavicle       Social History     Socioeconomic History    Marital status:    Tobacco Use    Smoking status: Never     Passive exposure: Never    Smokeless tobacco: Never   Vaping Use    Vaping status: Never Used   Substance and Sexual Activity    Alcohol use: No    Drug use: No    Sexual activity: Defer       Allergies   Allergen Reactions    Cortisone Shortness Of Breath       Current Outpatient Medications on File Prior to Visit   Medication Sig Dispense Refill    ACCU-CHEK TYREL PLUS test strip TEST DAILY 50 each 2    allopurinol (ZYLOPRIM) 100 MG tablet TAKE TWO TABLETS BY  MOUTH TWICE A  tablet 3    ammonium lactate (Lac-Hydrin) 12 % cream Apply  topically to the appropriate area as directed As Needed for Dry Skin (venosu stasis dermatitis). 385 g 5    atorvastatin (LIPITOR) 20 MG tablet TAKE 1 TABLET BY MOUTH AT BEDTIME 90 tablet 0    bisoprolol (ZEBeta) 5 MG tablet TAKE 1 TABLET BY MOUTH DAILY 90 tablet 3    Blood Glucose Monitoring Suppl (ACCU-CHEK TYREL PLUS) w/Device kit       buPROPion XL (WELLBUTRIN XL) 150 MG 24 hr tablet TAKE 1 TABLET BY MOUTH DAILY 90 tablet 3    cetirizine (zyrTEC) 10 MG tablet TAKE ONE TABLET BY MOUTH DAILY 90 tablet 3    cyclobenzaprine (FLEXERIL) 10 MG tablet Take 1 tablet by mouth 3 (Three) Times a Day As Needed for Muscle Spasms. 90 tablet 5    Dakins, full strength, (Dakins full strength) 0.5 % external solution Apply  topically to the appropriate area as directed 2 (Two) Times a Day. 473 mL 5    empagliflozin (JARDIANCE) 25 MG tablet tablet Take 1 tablet by mouth Daily. 90 tablet 3    furosemide (LASIX) 40 MG tablet TAKE 1 TABLET BY MOUTH DAILY 90 tablet 3    gabapentin (NEURONTIN) 400 MG capsule TAKE 1 CAPSULE BY MOUTH 3 TIMES A  capsule 1    hydrocortisone 2.5 % cream Apply  topically 2 (two) times a day. Wipe bottom twice daily 28 g 0    ibuprofen (ADVIL,MOTRIN) 800 MG tablet TAKE ONE TABLET BY MOUTH EVERY 8 HOURS AS NEEDED FOR MILD PAIN 90 tablet 5    Lancets misc 1 Device Daily. Dispense brand covered by insurance. Dx. e11.9 100 each 12    lisinopril (PRINIVIL,ZESTRIL) 10 MG tablet TAKE 1 TABLET BY MOUTH DAILY 90 tablet 3    magnesium oxide (MAGOX) 400 (241.3 Mg) MG tablet tablet TAKE ONE TABLET BY MOUTH DAILY 30 tablet 9    metFORMIN (GLUCOPHAGE) 1000 MG tablet TAKE 1 AND 1/2 TABLETS BY MOUTH EVERY MORNING  AND 1 TABLET EVERY EVENING 235 tablet 0    mupirocin (BACTROBAN) 2 % ointment Apply 1 Application topically to the appropriate area as directed 3 (Three) Times a Day As Needed (3 times daily as needed). 60 g 1    potassium  "chloride (K-DUR,KLOR-CON) 10 MEQ CR tablet TAKE THREE TABLETS BY MOUTH TWICE A DAY 54 tablet 0    Semaglutide, 1 MG/DOSE, (OZEMPIC) 4 MG/3ML solution pen-injector Inject 1 mg under the skin into the appropriate area as directed 1 (One) Time Per Week. 2 mL 2    sildenafil (VIAGRA) 100 MG tablet 1/2 to 1 po daily prn ED 10 tablet 5    Testosterone 20.25 MG/ACT (1.62%) gel APPLY 6 PUMPS DAILY TO EACH SHOULDERS OR INNER THIGHS 150 g 2    triamcinolone (KENALOG) 0.1 % cream Apply  topically to the appropriate area as directed 2 (Two) Times a Day. 30 g 2    urea (CARMOL) 40 % cream APPLY TO AFFECTED AREA(S) IN THE EVENING SECOND TOE DAILY AS DIRECTED 28.35 g 0    vitamin D (ERGOCALCIFEROL) 1.25 MG (76804 UT) capsule capsule Take 1 capsule by mouth 1 (One) Time Per Week. 12 capsule 3    Dulaglutide (Trulicity) 0.75 MG/0.5ML solution pen-injector Inject 0.75 mg under the skin into the appropriate area as directed 1 (One) Time Per Week. Call for dose increases (Patient not taking: Reported on 1/29/2025) 6 mL 1    [DISCONTINUED] levoFLOXacin (LEVAQUIN) 750 MG tablet Take 1 tablet by mouth Daily. (Patient not taking: Reported on 1/29/2025)       Current Facility-Administered Medications on File Prior to Visit   Medication Dose Route Frequency Provider Last Rate Last Admin    Testosterone Cypionate (DEPOTESTOTERONE CYPIONATE) injection 100 mg  100 mg Intramuscular Q14 Days Tesfaye Swartz DO   100 mg at 06/25/19 1220       Objective   Vitals:    01/29/25 0830   BP: 132/72   Pulse: 89   SpO2: 97%   Weight: (!) 152 kg (335 lb)   Height: 190.5 cm (75\")     Body mass index is 41.87 kg/m².    Physical Exam  Vitals and nursing note reviewed.   Constitutional:       Appearance: He is well-developed.   HENT:      Head: Normocephalic and atraumatic.   Neck:      Thyroid: No thyromegaly.      Vascular: No JVD.   Cardiovascular:      Rate and Rhythm: Normal rate and regular rhythm.      Heart sounds: Normal heart sounds. No murmur " heard.     No friction rub. No gallop.   Pulmonary:      Effort: Pulmonary effort is normal. No respiratory distress.      Breath sounds: Normal breath sounds. No wheezing or rales.   Abdominal:      General: Bowel sounds are normal. There is no distension.      Palpations: Abdomen is soft.      Tenderness: There is no abdominal tenderness. There is no guarding or rebound.   Musculoskeletal:      Cervical back: Neck supple.      Right lower leg: Edema present.      Left lower leg: Edema present.   Skin:     General: Skin is warm and dry.   Neurological:      Mental Status: He is alert.      Gait: Gait normal.   Psychiatric:         Behavior: Behavior normal.         Assessment & Plan   Diagnoses and all orders for this visit:    1. Type 2 diabetes mellitus with diabetic polyneuropathy, without long-term current use of insulin (Primary)  -     Microalbumin / Creatinine Urine Ratio - Urine, Clean Catch  -     Lipid Panel  -     Comprehensive Metabolic Panel  -     Hemoglobin A1c  -     TSH  -     empagliflozin (JARDIANCE) 25 MG tablet tablet; Take 1 tablet by mouth Daily.  Dispense: 90 tablet; Refill: 3  -     Semaglutide, 1 MG/DOSE, (OZEMPIC) 4 MG/3ML solution pen-injector; Inject 1 mg under the skin into the appropriate area as directed 1 (One) Time Per Week.  Dispense: 6 mL; Refill: 3    2. Diabetic peripheral neuropathy  -     gabapentin (NEURONTIN) 400 MG capsule; Take 1 capsule by mouth 3 (Three) Times a Day.  Dispense: 270 capsule; Refill: 1    3. Hypogonadism in male  -     CBC & Differential  -     Testosterone  -     Testosterone 20.25 MG/ACT (1.62%) gel; APPLY 6 PUMPS DAILY TO EACH SHOULDERS OR INNER THIGHS  Dispense: 150 g; Refill: 2    4. Idiopathic chronic gout of multiple sites without tophus  -     Uric Acid  -     allopurinol (ZYLOPRIM) 100 MG tablet; Take 2 tablets by mouth 2 (Two) Times a Day.  Dispense: 360 tablet; Refill: 3  -     ibuprofen (ADVIL,MOTRIN) 800 MG tablet; Take 1 tablet by mouth  Every 8 (Eight) Hours As Needed for Mild Pain.  Dispense: 90 tablet; Refill: 5    5. Benign prostatic hyperplasia without lower urinary tract symptoms  -     PSA DIAGNOSTIC    6. Hypertriglyceridemia  -     Lipid Panel    7. Lymphedema    8. Benign essential hypertension  -     allopurinol (ZYLOPRIM) 100 MG tablet; Take 2 tablets by mouth 2 (Two) Times a Day.  Dispense: 360 tablet; Refill: 3    9. Venous insufficiency    10. Drug therapy  -     CBC & Differential    11. Vitamin D deficiency  -     Vitamin D,25-Hydroxy    12. Severe single current episode of major depressive disorder, without psychotic features  -     buPROPion XL (WELLBUTRIN XL) 150 MG 24 hr tablet; Take 1 tablet by mouth Daily.  Dispense: 90 tablet; Refill: 3    13. Edema of lower extremity  -     furosemide (LASIX) 40 MG tablet; Take 1 tablet by mouth Daily.  Dispense: 90 tablet; Refill: 3    14. Pre-ulcerative corn or callous  -     urea (CARMOL) 40 % cream; Apply  topically to the appropriate area as directed Daily.  Dispense: 28.35 g; Refill: 12    15. Seasonal allergic rhinitis due to pollen  -     cetirizine (zyrTEC) 10 MG tablet; Take 1 tablet by mouth Daily.  Dispense: 90 tablet; Refill: 3    Other orders  -     bisoprolol (ZEBeta) 5 MG tablet; Take 1 tablet by mouth Daily.  Dispense: 90 tablet; Refill: 3  -     cyclobenzaprine (FLEXERIL) 10 MG tablet; Take 1 tablet by mouth 3 (Three) Times a Day As Needed for Muscle Spasms.  Dispense: 90 tablet; Refill: 5  -     lisinopril (PRINIVIL,ZESTRIL) 10 MG tablet; Take 1 tablet by mouth Daily.  Dispense: 90 tablet; Refill: 3  -     potassium chloride (KLOR-CON M10) 10 MEQ CR tablet; Take 1 tablet by mouth Daily.  Dispense: 90 tablet; Refill: 3  -     vitamin D (ERGOCALCIFEROL) 1.25 MG (99898 UT) capsule capsule; Take 1 capsule by mouth 1 (One) Time Per Week.  Dispense: 12 capsule; Refill: 3    -dm2 - continue medications, recheck labs  -hypertension - controlled, continue medications  -HLD -  continue statin, recheck lipids.  -wound - see podiatry as doing  -contineu compression and diuretic  -due check uric acid and vitamin d  -restart allergy medication                   -Follow up: 3 months and prn

## 2025-01-30 LAB
25(OH)D3+25(OH)D2 SERPL-MCNC: 25.6 NG/ML (ref 30–100)
ALBUMIN SERPL-MCNC: 4.4 G/DL (ref 3.8–4.9)
ALBUMIN/CREAT UR: <4 MG/G CREAT (ref 0–29)
ALP SERPL-CCNC: 95 IU/L (ref 44–121)
ALT SERPL-CCNC: 32 IU/L (ref 0–44)
AST SERPL-CCNC: 28 IU/L (ref 0–40)
BASOPHILS # BLD AUTO: 0 X10E3/UL (ref 0–0.2)
BASOPHILS NFR BLD AUTO: 0 %
BILIRUB SERPL-MCNC: 0.5 MG/DL (ref 0–1.2)
BUN SERPL-MCNC: 15 MG/DL (ref 6–24)
BUN/CREAT SERPL: 15 (ref 9–20)
CALCIUM SERPL-MCNC: 9.3 MG/DL (ref 8.7–10.2)
CHLORIDE SERPL-SCNC: 96 MMOL/L (ref 96–106)
CHOLEST SERPL-MCNC: 170 MG/DL (ref 100–199)
CO2 SERPL-SCNC: 25 MMOL/L (ref 20–29)
CREAT SERPL-MCNC: 0.98 MG/DL (ref 0.76–1.27)
CREAT UR-MCNC: 74.5 MG/DL
EGFRCR SERPLBLD CKD-EPI 2021: 92 ML/MIN/1.73
EOSINOPHIL # BLD AUTO: 0.1 X10E3/UL (ref 0–0.4)
EOSINOPHIL NFR BLD AUTO: 2 %
ERYTHROCYTE [DISTWIDTH] IN BLOOD BY AUTOMATED COUNT: 14.4 % (ref 11.6–15.4)
GLOBULIN SER CALC-MCNC: 2.7 G/DL (ref 1.5–4.5)
GLUCOSE SERPL-MCNC: 163 MG/DL (ref 70–99)
HBA1C MFR BLD: 8.7 % (ref 4.8–5.6)
HCT VFR BLD AUTO: 46.3 % (ref 37.5–51)
HDLC SERPL-MCNC: 31 MG/DL
HGB BLD-MCNC: 15.2 G/DL (ref 13–17.7)
IMM GRANULOCYTES # BLD AUTO: 0 X10E3/UL (ref 0–0.1)
IMM GRANULOCYTES NFR BLD AUTO: 0 %
LDLC SERPL CALC-MCNC: 67 MG/DL (ref 0–99)
LYMPHOCYTES # BLD AUTO: 1.5 X10E3/UL (ref 0.7–3.1)
LYMPHOCYTES NFR BLD AUTO: 31 %
MCH RBC QN AUTO: 29.2 PG (ref 26.6–33)
MCHC RBC AUTO-ENTMCNC: 32.8 G/DL (ref 31.5–35.7)
MCV RBC AUTO: 89 FL (ref 79–97)
MICROALBUMIN UR-MCNC: <3 UG/ML
MONOCYTES # BLD AUTO: 0.4 X10E3/UL (ref 0.1–0.9)
MONOCYTES NFR BLD AUTO: 7 %
NEUTROPHILS # BLD AUTO: 2.9 X10E3/UL (ref 1.4–7)
NEUTROPHILS NFR BLD AUTO: 60 %
PLATELET # BLD AUTO: 158 X10E3/UL (ref 150–450)
POTASSIUM SERPL-SCNC: 4 MMOL/L (ref 3.5–5.2)
PROT SERPL-MCNC: 7.1 G/DL (ref 6–8.5)
PSA SERPL-MCNC: 0.3 NG/ML (ref 0–4)
RBC # BLD AUTO: 5.2 X10E6/UL (ref 4.14–5.8)
SODIUM SERPL-SCNC: 137 MMOL/L (ref 134–144)
TESTOST SERPL-MCNC: 502 NG/DL (ref 264–916)
TRIGL SERPL-MCNC: 466 MG/DL (ref 0–149)
TSH SERPL DL<=0.005 MIU/L-ACNC: 2.62 UIU/ML (ref 0.45–4.5)
URATE SERPL-MCNC: 5.4 MG/DL (ref 3.8–8.4)
VLDLC SERPL CALC-MCNC: 72 MG/DL (ref 5–40)
WBC # BLD AUTO: 4.9 X10E3/UL (ref 3.4–10.8)

## 2025-02-02 NOTE — PROGRESS NOTES
Call results to patient.  Diabetes is not goal with A1c at 8.7%, have increase ozempic to 2mg weekly if can.  Ok send in with 12 ref.     Cholesterol controlled, but triglycerides are high and have risen.  Improving diabetes control with help, but would go ahead and increase atovastatin to 40mg daily send #90 3 ref.  Though for cholesterol mainly, it will lower triglycerides some.  Vitamin D improved a little, but still low;   Change the Vitamin D ergocalciferol (which is D2) to Vitamin D3 50,000 iu and have take 1 twice weekly send #24 3 ref.  Will go back down once to normal range (check at 6 month check).  Urine negative and thyroid normal

## 2025-02-03 RX ORDER — ERGOCALCIFEROL 1.25 MG/1
50000 CAPSULE, LIQUID FILLED ORAL 2 TIMES WEEKLY
Qty: 24 CAPSULE | Refills: 3 | Status: SHIPPED | OUTPATIENT
Start: 2025-02-03

## 2025-02-03 RX ORDER — SEMAGLUTIDE 2.68 MG/ML
2 INJECTION, SOLUTION SUBCUTANEOUS WEEKLY
Qty: 3 ML | Refills: 12 | Status: SHIPPED | OUTPATIENT
Start: 2025-02-03

## 2025-02-03 RX ORDER — ATORVASTATIN CALCIUM 40 MG/1
40 TABLET, FILM COATED ORAL NIGHTLY
Qty: 90 TABLET | Refills: 3 | Status: SHIPPED | OUTPATIENT
Start: 2025-02-03

## 2025-02-18 ENCOUNTER — TELEPHONE (OUTPATIENT)
Dept: FAMILY MEDICINE CLINIC | Facility: CLINIC | Age: 55
End: 2025-02-18
Payer: MEDICARE

## 2025-02-18 RX ORDER — DOXYCYCLINE 100 MG/1
100 CAPSULE ORAL 2 TIMES DAILY
Qty: 28 CAPSULE | Refills: 0 | Status: SHIPPED | OUTPATIENT
Start: 2025-02-18

## 2025-02-18 NOTE — TELEPHONE ENCOUNTER
Caller: Marcial Desai    Relationship: Self    Best call back number: [phone]     What medication are you requesting: ANTIBIOTIC    What are your current symptoms: PATIENT SAW DR MOSS TODAY AND THEY THINK PATIENT IS GETTING CELLULITIS IN THEIR RIGHT LEG AGAIN. PATIENT IS HAVING A SKIN GRAFT ON 02/25 AND THEY ARE WANTING PATIENT TO GET STARTED ON MEDICATION BEFORE THEN    How long have you been experiencing symptoms: A FEW DAYS    Have you had these symptoms before:    [x] Yes  [] No    Have you been treated for these symptoms before:   [x] Yes  [] No    If a prescription is needed, what is your preferred pharmacy and phone number: [facility] [id number] [facility] [address] [place] [address] [facility] [phone] [facility] [phone] FX     Additional notes:

## 2025-03-16 DIAGNOSIS — E78.1 HYPERTRIGLYCERIDEMIA: ICD-10-CM

## 2025-03-16 DIAGNOSIS — E11.42 TYPE 2 DIABETES MELLITUS WITH DIABETIC POLYNEUROPATHY, WITHOUT LONG-TERM CURRENT USE OF INSULIN: ICD-10-CM

## 2025-03-16 RX ORDER — ATORVASTATIN CALCIUM 20 MG/1
20 TABLET, FILM COATED ORAL
Qty: 90 TABLET | Refills: 0 | OUTPATIENT
Start: 2025-03-16

## 2025-03-20 DIAGNOSIS — E78.1 HYPERTRIGLYCERIDEMIA: ICD-10-CM

## 2025-03-20 DIAGNOSIS — E11.42 TYPE 2 DIABETES MELLITUS WITH DIABETIC POLYNEUROPATHY, WITHOUT LONG-TERM CURRENT USE OF INSULIN: ICD-10-CM

## 2025-03-20 RX ORDER — ATORVASTATIN CALCIUM 20 MG/1
20 TABLET, FILM COATED ORAL
Qty: 90 TABLET | Refills: 0 | OUTPATIENT
Start: 2025-03-20

## 2025-03-25 DIAGNOSIS — E78.1 HYPERTRIGLYCERIDEMIA: ICD-10-CM

## 2025-03-25 DIAGNOSIS — E11.42 TYPE 2 DIABETES MELLITUS WITH DIABETIC POLYNEUROPATHY, WITHOUT LONG-TERM CURRENT USE OF INSULIN: ICD-10-CM

## 2025-03-25 RX ORDER — ATORVASTATIN CALCIUM 20 MG/1
20 TABLET, FILM COATED ORAL
Qty: 90 TABLET | Refills: 0 | OUTPATIENT
Start: 2025-03-25

## 2025-03-31 DIAGNOSIS — E78.1 HYPERTRIGLYCERIDEMIA: ICD-10-CM

## 2025-03-31 DIAGNOSIS — E11.42 TYPE 2 DIABETES MELLITUS WITH DIABETIC POLYNEUROPATHY, WITHOUT LONG-TERM CURRENT USE OF INSULIN: ICD-10-CM

## 2025-03-31 RX ORDER — ATORVASTATIN CALCIUM 20 MG/1
20 TABLET, FILM COATED ORAL
Qty: 90 TABLET | Refills: 0 | OUTPATIENT
Start: 2025-03-31

## 2025-04-25 ENCOUNTER — EXTERNAL PBMM DATA (OUTPATIENT)
Dept: PHARMACY | Facility: OTHER | Age: 55
End: 2025-04-25
Payer: MEDICARE

## 2025-04-30 DIAGNOSIS — E11.42 TYPE 2 DIABETES MELLITUS WITH DIABETIC POLYNEUROPATHY, WITHOUT LONG-TERM CURRENT USE OF INSULIN: ICD-10-CM

## 2025-04-30 DIAGNOSIS — E78.1 HYPERTRIGLYCERIDEMIA: ICD-10-CM

## 2025-05-01 RX ORDER — ATORVASTATIN CALCIUM 20 MG/1
20 TABLET, FILM COATED ORAL
Qty: 90 TABLET | Refills: 0 | OUTPATIENT
Start: 2025-05-01

## 2025-05-06 ENCOUNTER — OFFICE VISIT (OUTPATIENT)
Dept: FAMILY MEDICINE CLINIC | Facility: CLINIC | Age: 55
End: 2025-05-06
Payer: MEDICARE

## 2025-05-06 VITALS
DIASTOLIC BLOOD PRESSURE: 70 MMHG | SYSTOLIC BLOOD PRESSURE: 130 MMHG | HEART RATE: 91 BPM | WEIGHT: 315 LBS | OXYGEN SATURATION: 97 % | HEIGHT: 75 IN | BODY MASS INDEX: 39.17 KG/M2

## 2025-05-06 DIAGNOSIS — J01.10 ACUTE NON-RECURRENT FRONTAL SINUSITIS: ICD-10-CM

## 2025-05-06 DIAGNOSIS — K42.9 UMBILICAL HERNIA WITHOUT OBSTRUCTION AND WITHOUT GANGRENE: ICD-10-CM

## 2025-05-06 DIAGNOSIS — E29.1 HYPOGONADISM IN MALE: ICD-10-CM

## 2025-05-06 DIAGNOSIS — I89.0 LYMPHEDEMA: ICD-10-CM

## 2025-05-06 DIAGNOSIS — H66.003 NON-RECURRENT ACUTE SUPPURATIVE OTITIS MEDIA OF BOTH EARS WITHOUT SPONTANEOUS RUPTURE OF TYMPANIC MEMBRANES: ICD-10-CM

## 2025-05-06 DIAGNOSIS — E11.42 DIABETIC PERIPHERAL NEUROPATHY: ICD-10-CM

## 2025-05-06 DIAGNOSIS — E78.1 HYPERTRIGLYCERIDEMIA: ICD-10-CM

## 2025-05-06 DIAGNOSIS — I10 BENIGN ESSENTIAL HYPERTENSION: ICD-10-CM

## 2025-05-06 DIAGNOSIS — Z00.00 MEDICARE ANNUAL WELLNESS VISIT, SUBSEQUENT: Primary | ICD-10-CM

## 2025-05-06 DIAGNOSIS — E11.42 TYPE 2 DIABETES MELLITUS WITH DIABETIC POLYNEUROPATHY, WITHOUT LONG-TERM CURRENT USE OF INSULIN: ICD-10-CM

## 2025-05-06 DIAGNOSIS — H10.33 ACUTE BACTERIAL CONJUNCTIVITIS OF BOTH EYES: ICD-10-CM

## 2025-05-06 DIAGNOSIS — N40.0 BENIGN PROSTATIC HYPERPLASIA WITHOUT LOWER URINARY TRACT SYMPTOMS: ICD-10-CM

## 2025-05-06 DIAGNOSIS — E79.0 HYPERURICEMIA: ICD-10-CM

## 2025-05-06 DIAGNOSIS — E55.9 VITAMIN D DEFICIENCY: ICD-10-CM

## 2025-05-06 RX ORDER — AMOXICILLIN 875 MG/1
875 TABLET, COATED ORAL 2 TIMES DAILY
Qty: 20 TABLET | Refills: 0 | Status: SHIPPED | OUTPATIENT
Start: 2025-05-06

## 2025-05-06 RX ORDER — BLOOD-GLUCOSE METER
KIT MISCELLANEOUS
Qty: 1 EACH | Refills: 0 | Status: SHIPPED | OUTPATIENT
Start: 2025-05-06

## 2025-05-06 RX ORDER — TOBRAMYCIN 3 MG/ML
2 SOLUTION/ DROPS OPHTHALMIC EVERY 6 HOURS SCHEDULED
Qty: 5 ML | Refills: 0 | Status: SHIPPED | OUTPATIENT
Start: 2025-05-06

## 2025-05-06 RX ORDER — GLUCOSAMINE HCL/CHONDROITIN SU 500-400 MG
CAPSULE ORAL
Qty: 100 EACH | Refills: 3 | Status: SHIPPED | OUTPATIENT
Start: 2025-05-06

## 2025-05-06 RX ORDER — BLOOD SUGAR DIAGNOSTIC
STRIP MISCELLANEOUS
Qty: 100 EACH | Refills: 3 | Status: SHIPPED | OUTPATIENT
Start: 2025-05-06

## 2025-05-06 RX ORDER — AVOBENZONE, HOMOSALATE, OCTISALATE, OCTOCRYLENE 30; 40; 45; 26 MG/ML; MG/ML; MG/ML; MG/ML
CREAM TOPICAL
Qty: 100 EACH | Refills: 3 | Status: SHIPPED | OUTPATIENT
Start: 2025-05-06

## 2025-05-06 NOTE — PROGRESS NOTES
QUICK REFERENCE INFORMATION:  The ABCs of the Annual Wellness Visit    Subsequent Medicare Wellness Visit    HEALTH RISK ASSESSMENT    1970  Marcial Desai is a 55 y.o. male who presents for an Subsequent Wellness Visit.    The following portions of the patient's history were reviewed and   updated as appropriate: allergies, current medications, past family history, past medical history, past social history, past surgical history, and problem list.    Compared to one year ago, the patient feels his physical   health is the same.    Compared to one year ago, the patient feels his mental   health is the same.    Recent Hospitalizations:  He was not admitted to the hospital during the last year.     Current Medical Providers:  Patient Care Team:  Tesfaye Swartz DO as PCP - General    I reviewed list of current Medical providers and suppliers with patient and are listed above to the best of the patient's and my knowledge.    Smoking Status:  Social History     Tobacco Use   Smoking Status Never    Passive exposure: Never   Smokeless Tobacco Never       Alcohol Consumption:  Social History     Substance and Sexual Activity   Alcohol Use No       Depression Screen:       2025     9:00 AM   PHQ-2/PHQ-9 Depression Screening   Little interest or pleasure in doing things Not at all   Feeling down, depressed, or hopeless Not at all       Health Habits and Functional and Cognitive Screenin/6/2025     9:00 AM   Functional & Cognitive Status   Do you have difficulty preparing food and eating? No   Do you have difficulty bathing yourself, getting dressed or grooming yourself? No   Do you have difficulty using the toilet? No   Do you have difficulty moving around from place to place? No   Do you have trouble with steps or getting out of a bed or a chair? No   Current Diet Well Balanced Diet   Dental Exam Not up to date   Eye Exam Not up to date   Exercise (times per week) 2 times per week   Current Exercises  "Include Walking   Do you need help using the phone?  No   Are you deaf or do you have serious difficulty hearing?  No   Do you need help to go to places out of walking distance? No   Do you need help shopping? No   Do you need help preparing meals?  No   Do you need help with housework?  No   Do you need help with laundry? No   Do you need help taking your medications? No   Do you need help managing money? No   Do you ever drive or ride in a car without wearing a seat belt? No   Have you felt unusual stress, anger or loneliness in the last month? No   Who do you live with? Spouse   If you need help, do you have trouble finding someone available to you? No   Have you been bothered in the last four weeks by sexual problems? No   Do you have difficulty concentrating, remembering or making decisions? No       Does the patient have evidence of cognitive impairment? No    Aspirin use counseling: Does not need ASA (and currently is not on it)    Recent Lab Results:  CMP:  Lab Results   Component Value Date    Glucose 163 (H) 01/29/2025    Glucose, UA 1000 mg/dL (A) 01/19/2017    BUN 15 01/29/2025    BUN/Creatinine Ratio 15 01/29/2025    Creatinine 0.98 01/29/2025    Creatinine, Urine 74.5 01/29/2025    Creatinine, Urine 50 08/22/2019    Ketones, UA Negative 01/19/2017    Total CO2 25 01/29/2025    Calcium 9.3 01/29/2025    Albumin 4.4 01/29/2025    AST (SGOT) 28 01/29/2025    ALT (SGPT) 32 01/29/2025     Lipid Panel:  Lab Results   Component Value Date    Total Cholesterol 170 01/29/2025    Triglycerides 466 (H) 01/29/2025    HDL Cholesterol 31 (L) 01/29/2025    VLDL Cholesterol 42.6 03/03/2020    VLDL Cholesterol Patrick 72 (H) 01/29/2025     HbA1c:  No components found for: \"HGBA1C\"    Visual Acuity:  No results found.    Age-appropriate Screening Schedule:  Refer to the list below for future screening recommendations based on patient's age, sex and/or medical conditions. Orders for these recommended tests are listed in the " plan section. The patient has been provided with a written plan.    Health Maintenance   Topic Date Due    Hepatitis B (1 of 3 - 19+ 3-dose series) Never done    DIABETIC EYE EXAM  01/21/2025    COLORECTAL CANCER SCREENING  04/11/2025    HEMOGLOBIN A1C  07/29/2025    ZOSTER VACCINE (1 of 2) 05/20/2025 (Originally 2/25/2021)    COVID-19 Vaccine (3 - 2024-25 season) 11/06/2025 (Originally 9/1/2024)    INFLUENZA VACCINE  07/01/2025    DIABETIC FOOT EXAM  08/20/2025    TDAP/TD VACCINES (2 - Td or Tdap) 01/19/2026    LIPID PANEL  01/29/2026    URINE MICROALBUMIN-CREATININE RATIO (uACR)  01/29/2026    ANNUAL WELLNESS VISIT  05/06/2026    HEPATITIS C SCREENING  Completed    Pneumococcal Vaccine 50+  Completed          Outpatient Medications Prior to Visit   Medication Sig Dispense Refill    allopurinol (ZYLOPRIM) 100 MG tablet Take 2 tablets by mouth 2 (Two) Times a Day. 360 tablet 3    ammonium lactate (Lac-Hydrin) 12 % cream Apply  topically to the appropriate area as directed As Needed for Dry Skin (venosu stasis dermatitis). 385 g 5    atorvastatin (Lipitor) 40 MG tablet Take 1 tablet by mouth Every Night. 90 tablet 3    bisoprolol (ZEBeta) 5 MG tablet Take 1 tablet by mouth Daily. 90 tablet 3    buPROPion XL (WELLBUTRIN XL) 150 MG 24 hr tablet Take 1 tablet by mouth Daily. 90 tablet 3    cetirizine (zyrTEC) 10 MG tablet Take 1 tablet by mouth Daily. 90 tablet 3    cyclobenzaprine (FLEXERIL) 10 MG tablet Take 1 tablet by mouth 3 (Three) Times a Day As Needed for Muscle Spasms. 90 tablet 5    Dakins, full strength, (Dakins full strength) 0.5 % external solution Apply  topically to the appropriate area as directed 2 (Two) Times a Day. 473 mL 5    empagliflozin (JARDIANCE) 25 MG tablet tablet Take 1 tablet by mouth Daily. 90 tablet 3    furosemide (LASIX) 40 MG tablet Take 1 tablet by mouth Daily. 90 tablet 3    gabapentin (NEURONTIN) 400 MG capsule Take 1 capsule by mouth 3 (Three) Times a Day. 270 capsule 1     hydrocortisone 2.5 % cream Apply  topically 2 (two) times a day. Wipe bottom twice daily 28 g 0    ibuprofen (ADVIL,MOTRIN) 800 MG tablet Take 1 tablet by mouth Every 8 (Eight) Hours As Needed for Mild Pain. 90 tablet 5    lisinopril (PRINIVIL,ZESTRIL) 10 MG tablet Take 1 tablet by mouth Daily. 90 tablet 3    magnesium oxide (MAGOX) 400 (241.3 Mg) MG tablet tablet TAKE ONE TABLET BY MOUTH DAILY 30 tablet 9    metFORMIN (GLUCOPHAGE) 1000 MG tablet TAKE 1 AND 1/2 TABLETS BY MOUTH EVERY MORNING  AND 1 TABLET EVERY EVENING 235 tablet 3    mupirocin (BACTROBAN) 2 % ointment Apply 1 Application topically to the appropriate area as directed 3 (Three) Times a Day As Needed (3 times daily as needed). 60 g 1    potassium chloride (KLOR-CON M10) 10 MEQ CR tablet Take 1 tablet by mouth Daily. 90 tablet 3    Testosterone 20.25 MG/ACT (1.62%) gel APPLY 6 PUMPS DAILY TO EACH SHOULDERS OR INNER THIGHS 150 g 2    triamcinolone (KENALOG) 0.1 % cream Apply  topically to the appropriate area as directed 2 (Two) Times a Day. 30 g 2    urea (CARMOL) 40 % cream Apply  topically to the appropriate area as directed Daily. 28.35 g 12    vitamin D (ERGOCALCIFEROL) 1.25 MG (59164 UT) capsule capsule Take 1 capsule by mouth 2 (Two) Times a Week. 24 capsule 3    ACCU-CHEK TYREL PLUS test strip TEST DAILY (Patient not taking: Reported on 5/6/2025) 50 each 2    Blood Glucose Monitoring Suppl (ACCU-CHEK TYREL PLUS) w/Device kit  (Patient not taking: Reported on 5/6/2025)      Lancets misc 1 Device Daily. Dispense brand covered by insurance. Dx. e11.9 (Patient not taking: Reported on 5/6/2025) 100 each 12    Semaglutide, 2 MG/DOSE, (Ozempic, 2 MG/DOSE,) 8 MG/3ML solution pen-injector Inject 2 mg under the skin into the appropriate area as directed 1 (One) Time Per Week. (Patient not taking: Reported on 5/6/2025) 3 mL 12    doxycycline (VIBRAMYCIN) 100 MG capsule Take 1 capsule by mouth 2 (Two) Times a Day. (Patient not taking: Reported on 5/6/2025)  28 capsule 0    sildenafil (VIAGRA) 100 MG tablet 1/2 to 1 po daily prn ED (Patient not taking: Reported on 5/6/2025) 10 tablet 5     Facility-Administered Medications Prior to Visit   Medication Dose Route Frequency Provider Last Rate Last Admin    Testosterone Cypionate (DEPOTESTOTERONE CYPIONATE) injection 100 mg  100 mg Intramuscular Q14 Days Tesfaye Swartz DO   100 mg at 06/25/19 1220       Patient Active Problem List   Diagnosis    Ankle pain    Atopic dermatitis    Benign essential hypertension    Type 2 diabetes mellitus with diabetic polyneuropathy, without long-term current use of insulin    Diabetic peripheral neuropathy    Abnormal liver function tests    Idiopathic chronic gout of multiple sites without tophus    Hypertriglyceridemia    Cramps of lower extremity    Edema of lower extremity    Lymphedema    Osteoarthritis    Morbid obesity due to excess calories    Hypogonadism in male    Ganglion cyst - left clavicle       Advance Care Planning:  ACP discussion was held with the patient during this visit. Patient does not have an advance directive, information provided.    Identification of Risk Factors:  Risk factors include: Obesity/Overweight .    Review of Systems   Constitutional:  Positive for chills and fever.   HENT:  Positive for congestion and ear pain.    Eyes:  Positive for discharge and redness.   Respiratory:  Negative for shortness of breath.    Cardiovascular:  Positive for leg swelling. Negative for chest pain and palpitations.   Gastrointestinal:  Negative for abdominal pain and nausea.       Objective     Physical Exam  Vitals and nursing note reviewed.   Constitutional:       Appearance: He is well-developed.   HENT:      Head: Normocephalic and atraumatic.      Right Ear: A middle ear effusion is present. Tympanic membrane is injected.      Left Ear: A middle ear effusion is present. Tympanic membrane is injected.      Nose: Congestion present.   Eyes:      General:          "Right eye: Discharge present.         Left eye: Discharge present.  Neck:      Thyroid: No thyromegaly.      Vascular: No JVD.   Cardiovascular:      Rate and Rhythm: Normal rate and regular rhythm.      Heart sounds: Normal heart sounds. No murmur heard.     No friction rub. No gallop.   Pulmonary:      Effort: Pulmonary effort is normal. No respiratory distress.      Breath sounds: Normal breath sounds. No wheezing or rales.   Abdominal:      General: Bowel sounds are normal. There is no distension.      Palpations: Abdomen is soft.      Tenderness: There is no abdominal tenderness. There is no guarding or rebound.      Hernia: A hernia is present. Hernia is present in the umbilical area.      Comments: Non-reproducible  Non-tender to palpation   Musculoskeletal:      Cervical back: Neck supple.      Right lower leg: Edema present.      Left lower leg: Edema present.   Skin:     General: Skin is warm and dry.   Neurological:      Mental Status: He is alert.      Gait: Gait normal.   Psychiatric:         Behavior: Behavior normal.         Vitals:    05/06/25 0905   BP: 130/70   Pulse: 91   SpO2: 97%   Weight: (!) 152 kg (334 lb)   Height: 190.5 cm (75\")     Body mass index is 41.75 kg/m².           Assessment & Plan   Patient Self-Management and Personalized Health Advice  The patient has been provided with information about: diet and exercise and preventive services including:   Annual Wellness Visit (AWV).    Visit Diagnoses:    ICD-10-CM ICD-9-CM   1. Medicare annual wellness visit, subsequent  Z00.00 V70.0   2. Type 2 diabetes mellitus with diabetic polyneuropathy, without long-term current use of insulin  E11.42 250.60     357.2   3. Hypogonadism in male  E29.1 257.2   4. Hypertriglyceridemia  E78.1 272.1   5. Benign essential hypertension  I10 401.1   6. Diabetic peripheral neuropathy  E11.42 250.60     357.2   7. Lymphedema  I89.0 457.1   8. Hyperuricemia  E79.0 790.6   9. Benign prostatic hyperplasia " without lower urinary tract symptoms  N40.0 600.00   10. Vitamin D deficiency  E55.9 268.9   11. Acute non-recurrent frontal sinusitis  J01.10 461.1   12. Non-recurrent acute suppurative otitis media of both ears without spontaneous rupture of tympanic membranes  H66.003 382.00   13. Acute bacterial conjunctivitis of both eyes  H10.33 372.03   14. Umbilical hernia without obstruction and without gangrene  K42.9 553.1       Orders Placed This Encounter   Procedures    Comprehensive Metabolic Panel     Release to patient:   Routine Release [[id number]]    Testosterone     Release to patient:   Routine Release [[id number]]    PSA DIAGNOSTIC     Release to patient:   Routine Release [[id number]]    Hemoglobin A1c     Release to patient:   Routine Release [[id number]]    Lipid Panel     Release to patient:   Routine Release [[id number]]    TSH     Release to patient:   Routine Release [[id number]]    Uric Acid     Release to patient:   Routine Release [[id number]]    Vitamin D,25-Hydroxy     Release to patient:   Routine Release [[id number]]    Ambulatory Referral to General Surgery     Referral Priority:   Routine     Referral Type:   Consultation     Referral Reason:   Specialty Services Required     Referral Location:   [facility]     Referred to Provider:   Jakob Paul MD     Requested Specialty:   General Surgery     Number of Visits Requested:   1    CBC & Differential     Manual Differential:   No     Release to patient:   Routine Release [[id number]]       Outpatient Encounter Medications as of 5/6/2025   Medication Sig Dispense Refill    allopurinol (ZYLOPRIM) 100 MG tablet Take 2 tablets by mouth 2 (Two) Times a Day. 360 tablet 3    ammonium lactate (Lac-Hydrin) 12 % cream Apply  topically to the appropriate area as directed As Needed for Dry Skin (venosu stasis dermatitis). 385 g 5    atorvastatin (Lipitor) 40 MG tablet Take 1 tablet by mouth Every Night. 90 tablet 3    bisoprolol  (ZEBeta) 5 MG tablet Take 1 tablet by mouth Daily. 90 tablet 3    buPROPion XL (WELLBUTRIN XL) 150 MG 24 hr tablet Take 1 tablet by mouth Daily. 90 tablet 3    cetirizine (zyrTEC) 10 MG tablet Take 1 tablet by mouth Daily. 90 tablet 3    cyclobenzaprine (FLEXERIL) 10 MG tablet Take 1 tablet by mouth 3 (Three) Times a Day As Needed for Muscle Spasms. 90 tablet 5    Dakins, full strength, (Dakins full strength) 0.5 % external solution Apply  topically to the appropriate area as directed 2 (Two) Times a Day. 473 mL 5    empagliflozin (JARDIANCE) 25 MG tablet tablet Take 1 tablet by mouth Daily. 90 tablet 3    furosemide (LASIX) 40 MG tablet Take 1 tablet by mouth Daily. 90 tablet 3    gabapentin (NEURONTIN) 400 MG capsule Take 1 capsule by mouth 3 (Three) Times a Day. 270 capsule 1    hydrocortisone 2.5 % cream Apply  topically 2 (two) times a day. Wipe bottom twice daily 28 g 0    ibuprofen (ADVIL,MOTRIN) 800 MG tablet Take 1 tablet by mouth Every 8 (Eight) Hours As Needed for Mild Pain. 90 tablet 5    lisinopril (PRINIVIL,ZESTRIL) 10 MG tablet Take 1 tablet by mouth Daily. 90 tablet 3    magnesium oxide (MAGOX) 400 (241.3 Mg) MG tablet tablet TAKE ONE TABLET BY MOUTH DAILY 30 tablet 9    metFORMIN (GLUCOPHAGE) 1000 MG tablet TAKE 1 AND 1/2 TABLETS BY MOUTH EVERY MORNING  AND 1 TABLET EVERY EVENING 235 tablet 3    mupirocin (BACTROBAN) 2 % ointment Apply 1 Application topically to the appropriate area as directed 3 (Three) Times a Day As Needed (3 times daily as needed). 60 g 1    potassium chloride (KLOR-CON M10) 10 MEQ CR tablet Take 1 tablet by mouth Daily. 90 tablet 3    Testosterone 20.25 MG/ACT (1.62%) gel APPLY 6 PUMPS DAILY TO EACH SHOULDERS OR INNER THIGHS 150 g 2    triamcinolone (KENALOG) 0.1 % cream Apply  topically to the appropriate area as directed 2 (Two) Times a Day. 30 g 2    urea (CARMOL) 40 % cream Apply  topically to the appropriate area as directed Daily. 28.35 g 12    vitamin D (ERGOCALCIFEROL)  1.25 MG (50939 UT) capsule capsule Take 1 capsule by mouth 2 (Two) Times a Week. 24 capsule 3    ACCU-CHEK TYREL PLUS test strip TEST DAILY (Patient not taking: Reported on 5/6/2025) 50 each 2    Alcohol Swabs (Alcohol Pads) 70 % pads Check once daily 100 each 3    amoxicillin (AMOXIL) 875 MG tablet Take 1 tablet by mouth 2 (Two) Times a Day. 20 tablet 0    Blood Glucose Monitoring Suppl (ACCU-CHEK TYREL PLUS) w/Device kit  (Patient not taking: Reported on 5/6/2025)      Glucose Blood (Blood Glucose Test) strip Check once daily 100 each 3    glucose monitor monitoring kit Check once daily dx non-iddm 1 each 0    Lancets misc 1 Device Daily. Dispense brand covered by insurance. Dx. e11.9 (Patient not taking: Reported on 5/6/2025) 100 each 12    Lancets misc Check once daily dx non-iddm 100 each 3    Semaglutide, 2 MG/DOSE, (Ozempic, 2 MG/DOSE,) 8 MG/3ML solution pen-injector Inject 2 mg under the skin into the appropriate area as directed 1 (One) Time Per Week. (Patient not taking: Reported on 5/6/2025) 3 mL 12    tobramycin (Tobrex) 0.3 % solution ophthalmic solution Administer 2 drops to both eyes Every 6 (Six) Hours. 5 mL 0    [DISCONTINUED] doxycycline (VIBRAMYCIN) 100 MG capsule Take 1 capsule by mouth 2 (Two) Times a Day. (Patient not taking: Reported on 5/6/2025) 28 capsule 0    [DISCONTINUED] sildenafil (VIAGRA) 100 MG tablet 1/2 to 1 po daily prn ED (Patient not taking: Reported on 5/6/2025) 10 tablet 5     Facility-Administered Encounter Medications as of 5/6/2025   Medication Dose Route Frequency Provider Last Rate Last Admin    Testosterone Cypionate (DEPOTESTOTERONE CYPIONATE) injection 100 mg  100 mg Intramuscular Q14 Days Tesfaye Swartz DO   100 mg at 06/25/19 1220       Reviewed use of high risk medication in the elderly: yes  Reviewed for potential of harmful drug interactions in the elderly: yes  No opioid medication identified on active medication list. I have reviewed chart for other potential   high risk medication/s and harmful drug interactions in the elderly.    Social History     Socioeconomic History    Marital status:    Tobacco Use    Smoking status: Never     Passive exposure: Never    Smokeless tobacco: Never   Vaping Use    Vaping status: Never Used   Substance and Sexual Activity    Alcohol use: No    Drug use: No    Sexual activity: Defer     Patient was screened for OUD and PARISH risk factors.  Marcial Desai's pain level was assessed as well today.  I included a review current recommendations on pain management, best use practices, current CDC guidelines, alternatives to opioids including OTC & Rx topicals, non-opioid oral medications (eg nsaids, acetominophen) and life style interventions such as yoga, delgado chi, stretching, regular exercise, PT/OT and referral to specialty care like Pain Management that specialize in pain treatment when appropriate.   I also included a review of serious risks of opioid use and substance use disorder.  I have included all of this in the after visit summary along with other risk factors identified that are pertinent to the patient today.      Follow Up:  Return in about 6 months (around 11/6/2025), or if symptoms worsen or fail to improve.     An After Visit Summary and PPPS with all of these plans were given to the patient.           ++++++++++++++++++++++++++++++++++++++++++++++++++++++++++++++++++   Additional E&M Note during same encounter follows:  Patient has multiple medical problems which are significant and separately identifiable that require additional work above and beyond the Medicare Wellness Visit.   Chief Complaint   Patient presents with    Diabetes    Medicare Wellness-subsequent    Gout    Hypertension    Hyperlipidemia    Cough     Productive - green     Sinusitis       HPI  History of Present Illness  The patient is a 55-year-old male with type 2 diabetes, diabetic peripheral neuropathy, hypogonadism, hypertriglyceridemia, hypertension,  dyslipidemia, lymphedema, hyperuricemia with a history of gout, and vitamin D deficiency. He is due for vitamin D check, A1c, lipid profile, TSH, uric acid, CMP, total testosterone, PSA, and CBC today. His last colorectal cancer screening was in 04/2022 with [facility], and he is due again.    He reports experiencing sinus pain and pressure, accompanied by green nasal discharge and significant drainage. His ocular symptoms include worsening vision and purulent discharge. He does not experience any earaches but reports a sensation of pressure. He also reports a sore throat but no chest pain, shortness of breath, or wheezing. He mentions that his grandchildren have recently developed rhinorrhea.    He has been living with a hernia for approximately 15 years, which has remained stable in size for the past 3 years. He does not experience any associated pain, nausea, or vomiting. He has been cautious about heavy lifting due to his diabetes and the potential risk of infection.    He has undergone nine skin grafts on his toe and anticipates the need for one or two more procedures. He plans to schedule these procedures for late June or July 2025 when his insurance coverage will be maximized.    His lymphedema is currently stable, but he is experiencing issues with cellulitis. His foot is currently bandaged as a precautionary measure.    He does not possess a glucose meter and is not currently on insulin therapy. He reports that he can discern when his blood sugar levels drop excessively, as this results in profuse sweating.    PAST SURGICAL HISTORY:  Nine skin grafts on toe.    Review of Systems   Constitutional: Positive for chills and fever.   HENT:  Positive for congestion and ear pain.    Eyes:  Positive for discharge and redness.   Cardiovascular:  Positive for leg swelling. Negative for chest pain and palpitations.   Respiratory:  Negative for shortness of breath.    Gastrointestinal:  Negative for abdominal pain and  "nausea.       Social History     Tobacco Use    Smoking status: Never     Passive exposure: Never    Smokeless tobacco: Never   Substance Use Topics    Alcohol use: No     O:   Vitals:    05/06/25 0905   BP: 130/70   Pulse: 91   SpO2: 97%   Weight: (!) 152 kg (334 lb)   Height: 190.5 cm (75\")     Body mass index is 41.75 kg/m².  Vitals and nursing note reviewed.   Constitutional:       Appearance: Well-developed.   Eyes:      General:         Right eye: Discharge present.         Left eye: Discharge present.  HENT:      Head: Normocephalic and atraumatic.      Right Ear: A middle ear effusion is present. Tympanic membrane is injected.      Left Ear: A middle ear effusion is present. Tympanic membrane is injected.      Nose: Congestion present.   Neck:      Thyroid: No thyromegaly.      Vascular: No JVD.   Pulmonary:      Effort: Pulmonary effort is normal. No respiratory distress.      Breath sounds: Normal breath sounds. No wheezing. No rales.   Cardiovascular:      Normal rate. Regular rhythm. Normal heart sounds.      No gallop.  No friction rub.   Edema:     Pretibial: bilateral edema of the pretibial area.  Abdominal:      General: Bowel sounds are normal. There is no distension.      Palpations: Abdomen is soft.      Tenderness: There is no abdominal tenderness. There is no guarding or rebound.      Hernia: A hernia is present. Hernia is present in the umbilical area.      Comments: Non-reproducible  Non-tender to palpation   Musculoskeletal:      Cervical back: Neck supple. Skin:     General: Skin is warm and dry.   Neurological:      Mental Status: Alert.      Gait: Gait normal.   Psychiatric:         Behavior: Behavior normal.       Results       Diagnoses and all orders for this visit:    1. Medicare annual wellness visit, subsequent (Primary)    2. Type 2 diabetes mellitus with diabetic polyneuropathy, without long-term current use of insulin  -     Comprehensive Metabolic Panel  -     Hemoglobin A1c  -   "   Lipid Panel  -     TSH  -     glucose monitor monitoring kit; Check once daily dx non-iddm  Dispense: 1 each; Refill: 0  -     Lancets misc; Check once daily dx non-iddm  Dispense: 100 each; Refill: 3  -     Glucose Blood (Blood Glucose Test) strip; Check once daily  Dispense: 100 each; Refill: 3  -     Alcohol Swabs (Alcohol Pads) 70 % pads; Check once daily  Dispense: 100 each; Refill: 3    3. Hypogonadism in male  -     Comprehensive Metabolic Panel  -     Testosterone  -     CBC & Differential    4. Hypertriglyceridemia  -     Comprehensive Metabolic Panel  -     Lipid Panel    5. Benign essential hypertension  -     Comprehensive Metabolic Panel    6. Diabetic peripheral neuropathy    7. Lymphedema    8. Hyperuricemia  -     Uric Acid    9. Benign prostatic hyperplasia without lower urinary tract symptoms  -     PSA DIAGNOSTIC    10. Vitamin D deficiency  -     Vitamin D,25-Hydroxy    11. Acute non-recurrent frontal sinusitis  -     amoxicillin (AMOXIL) 875 MG tablet; Take 1 tablet by mouth 2 (Two) Times a Day.  Dispense: 20 tablet; Refill: 0    12. Non-recurrent acute suppurative otitis media of both ears without spontaneous rupture of tympanic membranes  -     amoxicillin (AMOXIL) 875 MG tablet; Take 1 tablet by mouth 2 (Two) Times a Day.  Dispense: 20 tablet; Refill: 0    13. Acute bacterial conjunctivitis of both eyes  -     tobramycin (Tobrex) 0.3 % solution ophthalmic solution; Administer 2 drops to both eyes Every 6 (Six) Hours.  Dispense: 5 mL; Refill: 0    14. Umbilical hernia without obstruction and without gangrene  -     Ambulatory Referral to General Surgery      Assessment & Plan  1. Sinus infection.  - Presents with sinus pain, pressure, and green drainage.  - Physical exam reveals significant drainage.  - Discussion includes prescribing an antibiotic for the sinus infection.  - Antibiotic prescription will be sent to pharmacy.    2. Ear infections.  - Reports ear pressure and soreness.  -  Physical exam shows bilateral bright red eardrums.  - Discussion includes prescribing an antibiotic for ear infections.  - Antibiotic prescription will be sent to pharmacy.    3. Conjunctivitis.  - Reports eye issues with pus discharge.  - Physical exam confirms conjunctivitis.  - Discussion includes prescribing eyedrops for conjunctivitis.  - Eyedrop prescription will be sent to pharmacy.    4. Type 2 diabetes mellitus.  - Blood glucose levels appear well-regulated.  - Blood pressure recorded at 130/70.  - Discussion includes providing a glucose meter for home monitoring and advising spot-checking blood sugar once or twice a week.  - Glucose meter and supplies will be sent to pharmacy.    5. Umbilical hernia.  - Hernia has been present for about 15 years and has increased in size over the past 3 years.  - Physical exam reveals the hernia is larger than remembered.  - Referral to Dr. Paul at [facility] for further evaluation and management.  - Advised to seek immediate medical attention if experiencing severe pain associated with the hernia.  -I discussed with risks, signs and symptoms of incarcerate/strangulated hernia and if develops, go ER immediately.      6. Lymphedema.  - Reports ongoing issues with lymphedema and cellulitis in the leg.  - Physical exam findings include wrapped foot for cellulitis management.  - Continued monitoring of the condition recommended.  - Further intervention may be necessary if symptoms worsen.    7. Health maintenance.  - Due for vitamin D check, A1c, lipid profile, TSH, uric acid, CMP, total testosterone, PSA, and CBC.  - Blood work will be updated accordingly.  8. -hypertension - controlled, continue medications  9. -HLD - continue statin, recheck lipids.  10. Vitamin d def due recheck  11.  Hypogonadism - due check psa, cmp, cbc and testosterone level  12. Continue allopurinol, due check cbc, renal function and uric acid level  Return in about 6 months (around 11/6/2025),  or if symptoms worsen or fail to improve.      _____________________________________  Tesfaye Swartz DO, MS    Current Outpatient Medications on File Prior to Visit   Medication Sig Dispense Refill    allopurinol (ZYLOPRIM) 100 MG tablet Take 2 tablets by mouth 2 (Two) Times a Day. 360 tablet 3    ammonium lactate (Lac-Hydrin) 12 % cream Apply  topically to the appropriate area as directed As Needed for Dry Skin (venosu stasis dermatitis). 385 g 5    atorvastatin (Lipitor) 40 MG tablet Take 1 tablet by mouth Every Night. 90 tablet 3    bisoprolol (ZEBeta) 5 MG tablet Take 1 tablet by mouth Daily. 90 tablet 3    buPROPion XL (WELLBUTRIN XL) 150 MG 24 hr tablet Take 1 tablet by mouth Daily. 90 tablet 3    cetirizine (zyrTEC) 10 MG tablet Take 1 tablet by mouth Daily. 90 tablet 3    cyclobenzaprine (FLEXERIL) 10 MG tablet Take 1 tablet by mouth 3 (Three) Times a Day As Needed for Muscle Spasms. 90 tablet 5    Dakins, full strength, (Dakins full strength) 0.5 % external solution Apply  topically to the appropriate area as directed 2 (Two) Times a Day. 473 mL 5    empagliflozin (JARDIANCE) 25 MG tablet tablet Take 1 tablet by mouth Daily. 90 tablet 3    furosemide (LASIX) 40 MG tablet Take 1 tablet by mouth Daily. 90 tablet 3    gabapentin (NEURONTIN) 400 MG capsule Take 1 capsule by mouth 3 (Three) Times a Day. 270 capsule 1    hydrocortisone 2.5 % cream Apply  topically 2 (two) times a day. Wipe bottom twice daily 28 g 0    ibuprofen (ADVIL,MOTRIN) 800 MG tablet Take 1 tablet by mouth Every 8 (Eight) Hours As Needed for Mild Pain. 90 tablet 5    lisinopril (PRINIVIL,ZESTRIL) 10 MG tablet Take 1 tablet by mouth Daily. 90 tablet 3    magnesium oxide (MAGOX) 400 (241.3 Mg) MG tablet tablet TAKE ONE TABLET BY MOUTH DAILY 30 tablet 9    metFORMIN (GLUCOPHAGE) 1000 MG tablet TAKE 1 AND 1/2 TABLETS BY MOUTH EVERY MORNING  AND 1 TABLET EVERY EVENING 235 tablet 3    mupirocin (BACTROBAN) 2 % ointment Apply 1 Application  topically to the appropriate area as directed 3 (Three) Times a Day As Needed (3 times daily as needed). 60 g 1    potassium chloride (KLOR-CON M10) 10 MEQ CR tablet Take 1 tablet by mouth Daily. 90 tablet 3    Testosterone 20.25 MG/ACT (1.62%) gel APPLY 6 PUMPS DAILY TO EACH SHOULDERS OR INNER THIGHS 150 g 2    triamcinolone (KENALOG) 0.1 % cream Apply  topically to the appropriate area as directed 2 (Two) Times a Day. 30 g 2    urea (CARMOL) 40 % cream Apply  topically to the appropriate area as directed Daily. 28.35 g 12    vitamin D (ERGOCALCIFEROL) 1.25 MG (65749 UT) capsule capsule Take 1 capsule by mouth 2 (Two) Times a Week. 24 capsule 3    ACCU-CHEK TYREL PLUS test strip TEST DAILY (Patient not taking: Reported on 5/6/2025) 50 each 2    Blood Glucose Monitoring Suppl (ACCU-CHEK TYREL PLUS) w/Device kit  (Patient not taking: Reported on 5/6/2025)      Lancets misc 1 Device Daily. Dispense brand covered by insurance. Dx. e11.9 (Patient not taking: Reported on 5/6/2025) 100 each 12    Semaglutide, 2 MG/DOSE, (Ozempic, 2 MG/DOSE,) 8 MG/3ML solution pen-injector Inject 2 mg under the skin into the appropriate area as directed 1 (One) Time Per Week. (Patient not taking: Reported on 5/6/2025) 3 mL 12    [DISCONTINUED] doxycycline (VIBRAMYCIN) 100 MG capsule Take 1 capsule by mouth 2 (Two) Times a Day. (Patient not taking: Reported on 5/6/2025) 28 capsule 0    [DISCONTINUED] sildenafil (VIAGRA) 100 MG tablet 1/2 to 1 po daily prn ED (Patient not taking: Reported on 5/6/2025) 10 tablet 5     Current Facility-Administered Medications on File Prior to Visit   Medication Dose Route Frequency Provider Last Rate Last Admin    Testosterone Cypionate (DEPOTESTOTERONE CYPIONATE) injection 100 mg  100 mg Intramuscular Q14 Days Tesfaye Swartz DO   100 mg at 06/25/19 1220

## 2025-05-06 NOTE — PATIENT INSTRUCTIONS
Advance Care Planning and Advance Directives     You make decisions on a daily basis - decisions about where you want to live, your career, your home, your life. Perhaps one of the most important decisions you face is your choice for future medical care. Take time to talk with your family and your healthcare team and start planning today.  Advance Care Planning is a process that can help you:  Understand possible future healthcare decisions in light of your own experiences  Reflect on those decision in light of your goals and values  Discuss your decisions with those closest to you and the healthcare professionals that care for you  Make a plan by creating a document that reflects your wishes    Surrogate Decision Maker  In the event of a medical emergency, which has left you unable to communicate or to make your own decisions, you would need someone to make decisions for you.  It is important to discuss your preferences for medical treatment with this person while you are in good health.     Qualities of a surrogate decision maker:  Willing to take on this role and responsibility  Knows what you want for future medical care  Willing to follow your wishes even if they don't agree with them  Able to make difficult medical decisions under stressful circumstances    Advance Directives  These are legal documents you can create that will guide your healthcare team and decision maker(s) when needed. These documents can be stored in the electronic medical record.    Living Will - a legal document to guide your care if you have a terminal condition or a serious illness and are unable to communicate. States vary by statute in document names/types, but most forms may include one or more of the following:        -  Directions regarding life-prolonging treatments        -  Directions regarding artificially provided nutrition/hydration        -  Choosing a healthcare decision maker        -  Direction regarding organ/tissue  donation    Durable Power of  for Healthcare - this document names an -in-fact to make medical decisions for you, but it may also allow this person to make personal and financial decisions for you. Please seek the advice of an  if you need this type of document.    **Advance Directives are not required and no one may discriminate against you if you do not sign one.    Medical Orders  Many states allow specific forms/orders signed by your physician to record your wishes for medical treatment in your current state of health. This form, signed in personal communication with your physician, addresses resuscitation and other medical interventions that you may or may not want.      For more information or to schedule a time with a [facility] Advance Care Planning Facilitator contact: [facility] or call [phone] and someone will contact you directly.Calorie Counting for Weight Loss  Calories are units of energy. Your body needs a certain number of calories from food to keep going throughout the day. When you eat or drink more calories than your body needs, your body stores the extra calories mostly as fat. When you eat or drink fewer calories than your body needs, your body burns fat to get the energy it needs.  Calorie counting means keeping track of how many calories you eat and drink each day. Calorie counting can be helpful if you need to lose weight. If you eat fewer calories than your body needs, you should lose weight. Ask your health care provider what a healthy weight is for you.  For calorie counting to work, you will need to eat the right number of calories each day to lose a healthy amount of weight per week. A dietitian can help you figure out how many calories you need in a day and will suggest ways to reach your calorie goal.  A healthy amount of weight to lose each week is usually 1-2 lb (0.5-0.9 kg). This usually means that your daily calorie intake should be  reduced by 500-750 calories.  Eating 1,200-1,500 calories a day can help most women lose weight.  Eating 1,500-1,800 calories a day can help most men lose weight.  What do I need to know about calorie counting?  Work with your health care provider or dietitian to determine how many calories you should get each day. To meet your daily calorie goal, you will need to:  Find out how many calories are in each food that you would like to eat. Try to do this before you eat.  Decide how much of the food you plan to eat.  Keep a food log. Do this by writing down what you ate and how many calories it had.  To successfully lose weight, it is important to balance calorie counting with a healthy lifestyle that includes regular activity.  Where do I find calorie information?  The number of calories in a food can be found on a Nutrition Facts label. If a food does not have a Nutrition Facts label, try to look up the calories online or ask your dietitian for help.  Remember that calories are listed per serving. If you choose to have more than one serving of a food, you will have to multiply the calories per serving by the number of servings you plan to eat. For example, the label on a package of bread might say that a serving size is 1 slice and that there are 90 calories in a serving. If you eat 1 slice, you will have eaten 90 calories. If you eat 2 slices, you will have eaten 180 calories.  How do I keep a food log?  After each time that you eat, record the following in your food log as soon as possible:  What you ate. Be sure to include toppings, sauces, and other extras on the food.  How much you ate. This can be measured in cups, ounces, or number of items.  How many calories were in each food and drink.  The total number of calories in the food you ate.  Keep your food log near you, such as in a pocket-sized notebook or on an dania or website on your mobile phone. Some programs will calculate calories for you and show you how  many calories you have left to meet your daily goal.  What are some portion-control tips?  Know how many calories are in a serving. This will help you know how many servings you can have of a certain food.  Use a measuring cup to measure serving sizes. You could also try weighing out portions on a kitchen scale. With time, you will be able to estimate serving sizes for some foods.  Take time to put servings of different foods on your favorite plates or in your favorite bowls and cups so you know what a serving looks like.  Try not to eat straight from a food's packaging, such as from a bag or box. Eating straight from the package makes it hard to see how much you are eating and can lead to overeating. Put the amount you would like to eat in a cup or on a plate to make sure you are eating the right portion.  Use smaller plates, glasses, and bowls for smaller portions and to prevent overeating.  Try not to multitask. For example, avoid watching TV or using your computer while eating. If it is time to eat, sit down at a table and enjoy your food. This will help you recognize when you are full. It will also help you be more mindful of what and how much you are eating.  What are tips for following this plan?  Reading food labels  Check the calorie count compared with the serving size. The serving size may be smaller than what you are used to eating.  Check the source of the calories. Try to choose foods that are high in protein, fiber, and vitamins, and low in saturated fat, trans fat, and sodium.  Shopping  Read nutrition labels while you shop. This will help you make healthy decisions about which foods to buy.  Pay attention to nutrition labels for low-fat or fat-free foods. These foods sometimes have the same number of calories or more calories than the full-fat versions. They also often have added sugar, starch, or salt to make up for flavor that was removed with the fat.  Make a grocery list of lower-calorie foods  and stick to it.  Cooking  Try to cook your favorite foods in a healthier way. For example, try baking instead of frying.  Use low-fat dairy products.  Meal planning  Use more fruits and vegetables. One-half of your plate should be fruits and vegetables.  Include lean proteins, such as chicken, turkey, and fish.  Lifestyle  Each week, aim to do one of the followin minutes of moderate exercise, such as walking.  75 minutes of vigorous exercise, such as running.  General information  Know how many calories are in the foods you eat most often. This will help you calculate calorie counts faster.  Find a way of tracking calories that works for you. Get creative. Try different apps or programs if writing down calories does not work for you.  What foods should I eat?    Eat nutritious foods. It is better to have a nutritious, high-calorie food, such as an avocado, than a food with few nutrients, such as a bag of potato chips.  Use your calories on foods and drinks that will fill you up and will not leave you hungry soon after eating.  Examples of foods that fill you up are nuts and nut butters, vegetables, lean proteins, and high-fiber foods such as whole grains. High-fiber foods are foods with more than 5 g of fiber per serving.  Pay attention to calories in drinks. Low-calorie drinks include water and unsweetened drinks.  The items listed above may not be a complete list of foods and beverages you can eat. Contact a dietitian for more information.  What foods should I limit?  Limit foods or drinks that are not good sources of vitamins, minerals, or protein or that are high in unhealthy fats. These include:  Candy.  Other sweets.  Sodas, specialty coffee drinks, alcohol, and juice.  The items listed above may not be a complete list of foods and beverages you should avoid. Contact a dietitian for more information.  How do I count calories when eating out?  Pay attention to portions. Often, portions are much larger  when eating out. Try these tips to keep portions smaller:  Consider sharing a meal instead of getting your own.  If you get your own meal, eat only half of it. Before you start eating, ask for a container and put half of your meal into it.  When available, consider ordering smaller portions from the menu instead of full portions.  Pay attention to your food and drink choices. Knowing the way food is cooked and what is included with the meal can help you eat fewer calories.  If calories are listed on the menu, choose the lower-calorie options.  Choose dishes that include vegetables, fruits, whole grains, low-fat dairy products, and lean proteins.  Choose items that are boiled, broiled, grilled, or steamed. Avoid items that are buttered, battered, fried, or served with cream sauce. Items labeled as crispy are usually fried, unless stated otherwise.  Choose water, low-fat milk, unsweetened iced tea, or other drinks without added sugar. If you want an alcoholic beverage, choose a lower-calorie option, such as a glass of wine or light beer.  Ask for dressings, sauces, and syrups on the side. These are usually high in calories, so you should limit the amount you eat.  If you want a salad, choose a garden salad and ask for grilled meats. Avoid extra toppings such as roque, cheese, or fried items. Ask for the dressing on the side, or ask for olive oil and vinegar or lemon to use as dressing.  Estimate how many servings of a food you are given. Knowing serving sizes will help you be aware of how much food you are eating at restaurants.  Where to find more information  Centers for Disease Control and Prevention: www.cdc.gov  [place] Department of Agriculture: myplate.gov  Summary  Calorie counting means keeping track of how many calories you eat and drink each day. If you eat fewer calories than your body needs, you should lose weight.  A healthy amount of weight to lose per week is usually 1-2 lb (0.5-0.9 kg). This usually  means reducing your daily calorie intake by 500-750 calories.  The number of calories in a food can be found on a Nutrition Facts label. If a food does not have a Nutrition Facts label, try to look up the calories online or ask your dietitian for help.  Use smaller plates, glasses, and bowls for smaller portions and to prevent overeating.  Use your calories on foods and drinks that will fill you up and not leave you hungry shortly after a meal.  This information is not intended to replace advice given to you by your health care provider. Make sure you discuss any questions you have with your health care provider.  Document Revised: 01/28/2021 Document Reviewed: 01/28/2021  [employer] Patient Education © 2022 [employer]    Exercising to Lose Weight  Getting regular exercise is important for everyone. It is especially important if you are overweight. Being overweight increases your risk of heart disease, stroke, diabetes, high blood pressure, and several types of cancer. Exercising, and reducing the calories you consume, can help you lose weight and improve fitness and health.  Exercise can be moderate or vigorous intensity. To lose weight, most people need to do a certain amount of moderate or vigorous-intensity exercise each week.  How can exercise affect me?  You lose weight when you exercise enough to burn more calories than you eat. Exercise also reduces body fat and builds muscle. The more muscle you have, the more calories you burn. Exercise also:  Improves mood.  Reduces stress and tension.  Improves your overall fitness, flexibility, and endurance.  Increases bone strength.  Moderate-intensity exercise  Moderate-intensity exercise is any activity that gets you moving enough to burn at least three times more energy (calories) than if you were sitting.  Examples of moderate exercise include:  Walking a mile in 15 minutes.  Doing light yard work.  Biking at an easy pace.  Most people should get at least 150  minutes of moderate-intensity exercise a week to maintain their body weight.  Vigorous-intensity exercise  Vigorous-intensity exercise is any activity that gets you moving enough to burn at least six times more calories than if you were sitting. When you exercise at this intensity, you should be working hard enough that you are not able to carry on a conversation.  Examples of vigorous exercise include:  Running.  Playing a team sport, such as football, basketball, and soccer.  Jumping rope.  Most people should get at least 75 minutes a week of vigorous exercise to maintain their body weight.  What actions can I take to lose weight?  The amount of exercise you need to lose weight depends on:  Your age.  The type of exercise.  Any health conditions you have.  Your overall physical ability.  Talk to your health care provider about how much exercise you need and what types of activities are safe for you.  Nutrition    Make changes to your diet as told by your health care provider or diet and nutrition specialist (dietitian). This may include:  Eating fewer calories.  Eating more protein.  Eating less unhealthy fats.  Eating a diet that includes fresh fruits and vegetables, whole grains, low-fat dairy products, and lean protein.  Avoiding foods with added fat, salt, and sugar.  Drink plenty of water while you exercise to prevent dehydration or heat stroke.  Activity  Choose an activity that you enjoy and set realistic goals. Your health care provider can help you make an exercise plan that works for you.  Exercise at a moderate or vigorous intensity most days of the week.  The intensity of exercise may vary from person to person. You can tell how intense a workout is for you by paying attention to your breathing and heartbeat. Most people will notice their breathing and heartbeat get faster with more intense exercise.  Do resistance training twice each week, such as:  Push-ups.  Sit-ups.  Lifting weights.  Using  resistance bands.  Getting short amounts of exercise can be just as helpful as long, structured periods of exercise. If you have trouble finding time to exercise, try doing these things as part of your daily routine:  Get up, stretch, and walk around every 30 minutes throughout the day.  Go for a walk during your lunch break.  Park your car farther away from your destination.  If you take public transportation, get off one stop early and walk the rest of the way.  Make phone calls while standing up and walking around.  Take the stairs instead of elevators or escalators.  Wear comfortable clothes and shoes with good support.  Do not exercise so much that you hurt yourself, feel dizzy, or get very short of breath.  Where to find more information  [place] Department of Health and Human Services: www.hhs.gov  Centers for Disease Control and Prevention: www.cdc.gov  Contact a health care provider:  Before starting a new exercise program.  If you have questions or concerns about your weight.  If you have a medical problem that keeps you from exercising.  Get help right away if:  You have any of the following while exercising:  Injury.  Dizziness.  Difficulty breathing or shortness of breath that does not go away when you stop exercising.  Chest pain.  Rapid heartbeat.  These symptoms may represent a serious problem that is an emergency. Do not wait to see if the symptoms will go away. Get medical help right away. Call your local emergency services (911 in the [place] Do not drive yourself to the hospital.  Summary  Getting regular exercise is especially important if you are overweight.  Being overweight increases your risk of heart disease, stroke, diabetes, high blood pressure, and several types of cancer.  Losing weight happens when you burn more calories than you eat.  Reducing the amount of calories you eat, and getting regular moderate or vigorous exercise each week, helps you lose weight.  This information is not  intended to replace advice given to you by your health care provider. Make sure you discuss any questions you have with your health care provider.  Document Revised: 02/13/2022 Document Reviewed: 02/13/2022  [employer] Patient Education © 2022 [employer]

## 2025-05-07 LAB
25(OH)D3+25(OH)D2 SERPL-MCNC: 38.9 NG/ML (ref 30–100)
ALBUMIN SERPL-MCNC: 4.6 G/DL (ref 3.5–5.2)
ALBUMIN/GLOB SERPL: 1.7 G/DL
ALP SERPL-CCNC: 110 U/L (ref 39–117)
ALT SERPL-CCNC: 22 U/L (ref 1–41)
AST SERPL-CCNC: 23 U/L (ref 1–40)
BASOPHILS # BLD AUTO: 0.02 10*3/MM3 (ref 0–0.2)
BASOPHILS NFR BLD AUTO: 0.2 % (ref 0–1.5)
BILIRUB SERPL-MCNC: 1 MG/DL (ref 0–1.2)
BUN SERPL-MCNC: 18 MG/DL (ref 6–20)
BUN/CREAT SERPL: 18.2 (ref 7–25)
CALCIUM SERPL-MCNC: 9.5 MG/DL (ref 8.6–10.5)
CHLORIDE SERPL-SCNC: 95 MMOL/L (ref 98–107)
CHOLEST SERPL-MCNC: 114 MG/DL (ref 0–200)
CO2 SERPL-SCNC: 26 MMOL/L (ref 22–29)
CREAT SERPL-MCNC: 0.99 MG/DL (ref 0.76–1.27)
EGFRCR SERPLBLD CKD-EPI 2021: 90.5 ML/MIN/1.73
EOSINOPHIL # BLD AUTO: 0.12 10*3/MM3 (ref 0–0.4)
EOSINOPHIL NFR BLD AUTO: 1.3 % (ref 0.3–6.2)
ERYTHROCYTE [DISTWIDTH] IN BLOOD BY AUTOMATED COUNT: 14.5 % (ref 12.3–15.4)
GLOBULIN SER CALC-MCNC: 2.7 GM/DL
GLUCOSE SERPL-MCNC: 177 MG/DL (ref 65–99)
HBA1C MFR BLD: 8.5 % (ref 4.8–5.6)
HCT VFR BLD AUTO: 47.2 % (ref 37.5–51)
HDLC SERPL-MCNC: 32 MG/DL (ref 40–60)
HGB BLD-MCNC: 15.2 G/DL (ref 13–17.7)
IMM GRANULOCYTES # BLD AUTO: 0.03 10*3/MM3 (ref 0–0.05)
IMM GRANULOCYTES NFR BLD AUTO: 0.3 % (ref 0–0.5)
LDLC SERPL CALC-MCNC: 48 MG/DL (ref 0–100)
LYMPHOCYTES # BLD AUTO: 1.67 10*3/MM3 (ref 0.7–3.1)
LYMPHOCYTES NFR BLD AUTO: 18.2 % (ref 19.6–45.3)
MCH RBC QN AUTO: 29.2 PG (ref 26.6–33)
MCHC RBC AUTO-ENTMCNC: 32.2 G/DL (ref 31.5–35.7)
MCV RBC AUTO: 90.8 FL (ref 79–97)
MONOCYTES # BLD AUTO: 0.66 10*3/MM3 (ref 0.1–0.9)
MONOCYTES NFR BLD AUTO: 7.2 % (ref 5–12)
NEUTROPHILS # BLD AUTO: 6.69 10*3/MM3 (ref 1.7–7)
NEUTROPHILS NFR BLD AUTO: 72.8 % (ref 42.7–76)
NRBC BLD AUTO-RTO: 0 /100 WBC (ref 0–0.2)
PLATELET # BLD AUTO: 169 10*3/MM3 (ref 140–450)
POTASSIUM SERPL-SCNC: 4 MMOL/L (ref 3.5–5.2)
PROT SERPL-MCNC: 7.3 G/DL (ref 6–8.5)
PSA SERPL-MCNC: 0.34 NG/ML (ref 0–4)
RBC # BLD AUTO: 5.2 10*6/MM3 (ref 4.14–5.8)
SODIUM SERPL-SCNC: 135 MMOL/L (ref 136–145)
TESTOST SERPL-MCNC: 487 NG/DL (ref 264–916)
TRIGL SERPL-MCNC: 214 MG/DL (ref 0–150)
TSH SERPL DL<=0.005 MIU/L-ACNC: 1.93 UIU/ML (ref 0.27–4.2)
URATE SERPL-MCNC: 7.8 MG/DL (ref 3.4–7)
VLDLC SERPL CALC-MCNC: 34 MG/DL (ref 5–40)
WBC # BLD AUTO: 9.19 10*3/MM3 (ref 3.4–10.8)

## 2025-05-08 DIAGNOSIS — E29.1 HYPOGONADISM IN MALE: ICD-10-CM

## 2025-05-08 DIAGNOSIS — L84 PRE-ULCERATIVE CORN OR CALLOUS: ICD-10-CM

## 2025-05-09 RX ORDER — UREA 40 %
CREAM (GRAM) TOPICAL
Qty: 28.35 G | Refills: 12 | Status: SHIPPED | OUTPATIENT
Start: 2025-05-09

## 2025-05-09 RX ORDER — TESTOSTERONE 1.62 MG/G
GEL TRANSDERMAL
Qty: 150 G | Refills: 2 | Status: SHIPPED | OUTPATIENT
Start: 2025-05-09

## 2025-05-09 RX ORDER — SEMAGLUTIDE 2.68 MG/ML
2 INJECTION, SOLUTION SUBCUTANEOUS WEEKLY
Qty: 3 ML | Refills: 12 | Status: SHIPPED | OUTPATIENT
Start: 2025-05-09

## 2025-05-12 DIAGNOSIS — I10 BENIGN ESSENTIAL HYPERTENSION: ICD-10-CM

## 2025-05-12 DIAGNOSIS — M1A.09X0 IDIOPATHIC CHRONIC GOUT OF MULTIPLE SITES WITHOUT TOPHUS: ICD-10-CM

## 2025-05-12 RX ORDER — ALLOPURINOL 300 MG/1
300 TABLET ORAL DAILY
Qty: 90 TABLET | Refills: 1 | Status: SHIPPED | OUTPATIENT
Start: 2025-05-12

## 2025-05-12 RX ORDER — GLIPIZIDE 10 MG/1
10 TABLET, FILM COATED, EXTENDED RELEASE ORAL DAILY
Qty: 30 TABLET | Refills: 5 | Status: SHIPPED | OUTPATIENT
Start: 2025-05-12

## 2025-06-17 ENCOUNTER — OFFICE VISIT (OUTPATIENT)
Dept: SURGERY | Facility: CLINIC | Age: 55
End: 2025-06-17
Payer: MEDICARE

## 2025-06-17 VITALS
DIASTOLIC BLOOD PRESSURE: 82 MMHG | HEART RATE: 106 BPM | SYSTOLIC BLOOD PRESSURE: 142 MMHG | BODY MASS INDEX: 38.36 KG/M2 | OXYGEN SATURATION: 97 % | HEIGHT: 76 IN | WEIGHT: 315 LBS

## 2025-06-17 DIAGNOSIS — E11.42 TYPE 2 DIABETES MELLITUS WITH DIABETIC POLYNEUROPATHY, WITHOUT LONG-TERM CURRENT USE OF INSULIN: ICD-10-CM

## 2025-06-17 DIAGNOSIS — K42.9 UMBILICAL HERNIA WITHOUT OBSTRUCTION AND WITHOUT GANGRENE: Primary | ICD-10-CM

## 2025-06-17 DIAGNOSIS — E78.1 HYPERTRIGLYCERIDEMIA: ICD-10-CM

## 2025-06-17 PROCEDURE — 3077F SYST BP >= 140 MM HG: CPT | Performed by: SURGERY

## 2025-06-17 PROCEDURE — 1159F MED LIST DOCD IN RCRD: CPT | Performed by: SURGERY

## 2025-06-17 PROCEDURE — 3079F DIAST BP 80-89 MM HG: CPT | Performed by: SURGERY

## 2025-06-17 PROCEDURE — 99203 OFFICE O/P NEW LOW 30 MIN: CPT | Performed by: SURGERY

## 2025-06-17 PROCEDURE — 1160F RVW MEDS BY RX/DR IN RCRD: CPT | Performed by: SURGERY

## 2025-06-17 RX ORDER — ATORVASTATIN CALCIUM 20 MG/1
20 TABLET, FILM COATED ORAL
Qty: 90 TABLET | Refills: 0 | OUTPATIENT
Start: 2025-06-17

## 2025-06-18 NOTE — PROGRESS NOTES
Date: 2025   Patient Name: Marcial Desai   Medical Record #: [id number]   : 1970  Age: 55 y.o.  Sex: male      Referring MD:  Tesfaye Swartz DO  [id number]  [address] [address]    Reason for Visit  Chief Complaint   Patient presents with    Hernia        History of Present Illness:     Marcial Desai is a 55 y.o. male who presents with a painful abdominal mass at the level of the umbilicus.  The mass has been present for several years.  He reports noticing larger size of the hernia since he started losing weight.  He had pain on the hernia several weeks ago when he was coughing.  Otherwise, he reports minimal symptoms he denies any obstructive symptoms.  He is able to partially reduce the hernia.  Does not have history of hernia repair in the past    Past Medical History    Patient Active Problem List   Diagnosis    Ankle pain    Atopic dermatitis    Benign essential hypertension    Type 2 diabetes mellitus with diabetic polyneuropathy, without long-term current use of insulin    Diabetic peripheral neuropathy    Abnormal liver function tests    Idiopathic chronic gout of multiple sites without tophus    Hypertriglyceridemia    Cramps of lower extremity    Edema of lower extremity    Lymphedema    Osteoarthritis    Morbid obesity due to excess calories    Hypogonadism in male    Ganglion cyst - left clavicle         Past Surgical History    Past Surgical History:   Procedure Laterality Date    VEIN SURGERY Right        Allergies    Allergies   Allergen Reactions    Cortisone Shortness Of Breath       Medications  Current Outpatient Medications   Medication Sig Dispense Refill    Alcohol Swabs (Alcohol Pads) 70 % pads Check once daily 100 each 3    allopurinol (Zyloprim) 300 MG tablet Take 1 tablet by mouth Daily. 90 tablet 1    ammonium lactate (Lac-Hydrin) 12 % cream Apply  topically to the appropriate area as directed As Needed for Dry Skin (venosu stasis dermatitis). 385 g 5     atorvastatin (Lipitor) 40 MG tablet Take 1 tablet by mouth Every Night. 90 tablet 3    benzonatate (TESSALON) 200 MG capsule Take 1 capsule by mouth 3 (Three) Times a Day As Needed for Cough. 40 capsule 0    bisoprolol (ZEBeta) 5 MG tablet Take 1 tablet by mouth Daily. 90 tablet 3    Blood Glucose Monitoring Suppl (ACCU-CHEK TYREL PLUS) w/Device kit       buPROPion XL (WELLBUTRIN XL) 150 MG 24 hr tablet Take 1 tablet by mouth Daily. 90 tablet 3    cefdinir (OMNICEF) 300 MG capsule Take 1 capsule by mouth 2 (Two) Times a Day. 20 capsule 0    cetirizine (zyrTEC) 10 MG tablet Take 1 tablet by mouth Daily. 24 tablet 0    cyclobenzaprine (FLEXERIL) 10 MG tablet Take 1 tablet by mouth 3 (Three) Times a Day As Needed for Muscle Spasms. 90 tablet 5    Dakins, full strength, (Dakins full strength) 0.5 % external solution Apply  topically to the appropriate area as directed 2 (Two) Times a Day. 473 mL 5    empagliflozin (JARDIANCE) 25 MG tablet tablet Take 1 tablet by mouth Daily. 90 tablet 3    furosemide (LASIX) 40 MG tablet Take 1 tablet by mouth Daily. 90 tablet 3    gabapentin (NEURONTIN) 400 MG capsule Take 1 capsule by mouth 3 (Three) Times a Day. 270 capsule 1    glipizide (GLUCOTROL XL) 10 MG 24 hr tablet Take 1 tablet by mouth Daily. 30 tablet 5    Glucose Blood (Blood Glucose Test) strip Check once daily 100 each 3    glucose monitor monitoring kit Check once daily dx non-iddm 1 each 0    hydrocortisone 2.5 % cream Apply  topically 2 (two) times a day. Wipe bottom twice daily 28 g 0    ibuprofen (ADVIL,MOTRIN) 800 MG tablet Take 1 tablet by mouth Every 8 (Eight) Hours As Needed for Mild Pain. 90 tablet 5    Lancets misc 1 Device Daily. Dispense brand covered by insurance. Dx. e11.9 100 each 12    Lancets misc Check once daily dx non-iddm 100 each 3    lisinopril (PRINIVIL,ZESTRIL) 10 MG tablet Take 1 tablet by mouth Daily. 90 tablet 3    magnesium oxide (MAGOX) 400 (241.3 Mg) MG tablet tablet TAKE ONE TABLET BY  MOUTH DAILY 30 tablet 9    metFORMIN (GLUCOPHAGE) 1000 MG tablet TAKE 1 AND 1/2 TABLETS BY MOUTH EVERY MORNING  AND 1 TABLET EVERY EVENING 235 tablet 3    potassium chloride (KLOR-CON M10) 10 MEQ CR tablet Take 1 tablet by mouth Daily. 90 tablet 3    promethazine-dextromethorphan (PROMETHAZINE-DM) 6.25-15 MG/5ML syrup Take 5 mL by mouth 4 (Four) Times a Day As Needed for Cough. 118 mL 0    Testosterone 20.25 MG/ACT (1.62%) gel APPLY 6 PUMPS DAILY TO EACH SHOULDERS OR INNER THIGHS 150 g 2    tobramycin (Tobrex) 0.3 % solution ophthalmic solution Administer 2 drops to both eyes Every 6 (Six) Hours. 5 mL 0    tobramycin 0.3 % solution ophthalmic solution Administer 1 drop to both eyes Every 4 (Four) Hours. 10 mL 0    triamcinolone (KENALOG) 0.1 % cream Apply  topically to the appropriate area as directed 2 (Two) Times a Day. 30 g 2    urea (CARMOL) 40 % cream Apply  topically to the appropriate area as directed Daily. 28.35 g 12    vitamin D (ERGOCALCIFEROL) 1.25 MG (83039 UT) capsule capsule Take 1 capsule by mouth 2 (Two) Times a Week. 24 capsule 3    ACCU-CHEK TYREL PLUS test strip TEST DAILY (Patient not taking: Reported on 6/17/2025) 50 each 2    amoxicillin (AMOXIL) 875 MG tablet Take 1 tablet by mouth 2 (Two) Times a Day. (Patient not taking: Reported on 6/17/2025) 20 tablet 0    mupirocin (BACTROBAN) 2 % ointment Apply 1 Application topically to the appropriate area as directed 3 (Three) Times a Day As Needed (3 times daily as needed). (Patient not taking: Reported on 6/17/2025) 60 g 1    Semaglutide, 2 MG/DOSE, (Ozempic, 2 MG/DOSE,) 8 MG/3ML solution pen-injector Inject 2 mg under the skin into the appropriate area as directed 1 (One) Time Per Week. (Patient not taking: Reported on 6/17/2025) 3 mL 12     Current Facility-Administered Medications   Medication Dose Route Frequency Provider Last Rate Last Admin    Testosterone Cypionate (DEPOTESTOTERONE CYPIONATE) injection 100 mg  100 mg Intramuscular Q14 Days  "Tesfaye Swartz, DO   100 mg at 06/25/19 1220         Social History  Social History     Socioeconomic History    Marital status:    Tobacco Use    Smoking status: Never     Passive exposure: Never    Smokeless tobacco: Never   Vaping Use    Vaping status: Never Used   Substance and Sexual Activity    Alcohol use: No    Drug use: No    Sexual activity: Defer       Family History  Family History   Problem Relation Age of Onset    Colon cancer Mother     Breast cancer Mother     Cancer Maternal Grandmother         Bladder cancer    Pancreatic cancer Paternal Grandfather        REVIEW OF SYSTEMS    CONSTITUTIONAL: Reports intentional weight loss  Otherwise, negative review of systems    Physical Examination  /82 (BP Location: Left arm, Patient Position: Sitting, Cuff Size: Adult)   Pulse 106   Ht 193 cm (76\")   Wt (!) 150 kg (330 lb 3.2 oz)   SpO2 97%   BMI 40.19 kg/m²   Body mass index is 40.19 kg/m².  GENERAL: Alert, no acute distress  HEENT: normochephalic, atraumatic, moist mucus membranes, no scleral icterus   CHEST: Breathing comfortable  CARDIAC: regular rate and rhythm    ABDOMEN: Abdomen soft, nontender, there is a large partially reducible minimally symptomatic or tender umbilical hernia.  The contents seems to be omentum.  There is thinning of the skin at the umbilicus.  There is moderate rectus muscle diastases  EXTREMITIES: no cyanosis, clubbing or edema    NEURO: alert and oriented, normal speech, cranial nerves 2-12 grossly intact, no focal deficits  SKIN: Warm and moist    Medical Decision Making  Test Results:  Results for orders placed or performed in visit on 05/06/25   Comprehensive Metabolic Panel    Collection Time: 05/06/25  9:36 AM    Specimen: Blood   Result Value Ref Range    Glucose 177 (H) 65 - 99 mg/dL    BUN 18 6 - 20 mg/dL    Creatinine 0.99 0.76 - 1.27 mg/dL    EGFR Result 90.5 >60.0 mL/min/1.73    BUN/Creatinine Ratio 18.2 7.0 - 25.0    Sodium 135 (L) 136 - 145 " mmol/L    Potassium 4.0 3.5 - 5.2 mmol/L    Chloride 95 (L) 98 - 107 mmol/L    Total CO2 26.0 22.0 - 29.0 mmol/L    Calcium 9.5 8.6 - 10.5 mg/dL    Total Protein 7.3 6.0 - 8.5 g/dL    Albumin 4.6 3.5 - 5.2 g/dL    Globulin 2.7 gm/dL    A/G Ratio 1.7 g/dL    Total Bilirubin 1.0 0.0 - 1.2 mg/dL    Alkaline Phosphatase 110 39 - 117 U/L    AST (SGOT) 23 1 - 40 U/L    ALT (SGPT) 22 1 - 41 U/L   Testosterone    Collection Time: 05/06/25  9:36 AM    Specimen: Blood   Result Value Ref Range    Testosterone, Total 487 264 - 916 ng/dL   PSA DIAGNOSTIC    Collection Time: 05/06/25  9:36 AM    Specimen: Blood   Result Value Ref Range    PSA 0.345 0.000 - 4.000 ng/mL   Hemoglobin A1c    Collection Time: 05/06/25  9:36 AM    Specimen: Blood   Result Value Ref Range    Hemoglobin A1C 8.50 (H) 4.80 - 5.60 %   Lipid Panel    Collection Time: 05/06/25  9:36 AM    Specimen: Blood   Result Value Ref Range    Total Cholesterol 114 0 - 200 mg/dL    Triglycerides 214 (H) 0 - 150 mg/dL    HDL Cholesterol 32 (L) 40 - 60 mg/dL    VLDL Cholesterol Patrick 34 5 - 40 mg/dL    LDL Chol Calc (NIH) 48 0 - 100 mg/dL   TSH    Collection Time: 05/06/25  9:36 AM    Specimen: Blood   Result Value Ref Range    TSH 1.930 0.270 - 4.200 uIU/mL   Uric Acid    Collection Time: 05/06/25  9:36 AM    Specimen: Blood   Result Value Ref Range    Uric Acid 7.8 (H) 3.4 - 7.0 mg/dL   Vitamin D,25-Hydroxy    Collection Time: 05/06/25  9:36 AM    Specimen: Blood   Result Value Ref Range    25 Hydroxy, Vitamin D 38.9 30.0 - 100.0 ng/ml   CBC & Differential    Collection Time: 05/06/25  9:36 AM    Specimen: Blood   Result Value Ref Range    WBC 9.19 3.40 - 10.80 10*3/mm3    RBC 5.20 4.14 - 5.80 10*6/mm3    Hemoglobin 15.2 13.0 - 17.7 g/dL    Hematocrit 47.2 37.5 - 51.0 %    MCV 90.8 79.0 - 97.0 fL    MCH 29.2 26.6 - 33.0 pg    MCHC 32.2 31.5 - 35.7 g/dL    RDW 14.5 12.3 - 15.4 %    Platelets 169 140 - 450 10*3/mm3    Neutrophil Rel % 72.8 42.7 - 76.0 %    Lymphocyte Rel %  18.2 (L) 19.6 - 45.3 %    Monocyte Rel % 7.2 5.0 - 12.0 %    Eosinophil Rel % 1.3 0.3 - 6.2 %    Basophil Rel % 0.2 0.0 - 1.5 %    Neutrophils Absolute 6.69 1.70 - 7.00 10*3/mm3    Lymphocytes Absolute 1.67 0.70 - 3.10 10*3/mm3    Monocytes Absolute 0.66 0.10 - 0.90 10*3/mm3    Eosinophils Absolute 0.12 0.00 - 0.40 10*3/mm3    Basophils Absolute 0.02 0.00 - 0.20 10*3/mm3    Immature Granulocyte Rel % 0.3 0.0 - 0.5 %    Immature Grans Absolute 0.03 0.00 - 0.05 10*3/mm3    nRBC 0.0 0.0 - 0.2 /100 WBC       Impression:  This is a 55 y.o. male patient with a chief complaint of a mildly symptomatic large incarcerated umbilical hernia.  Patient has been doing great losing weight.  He is having minimal symptoms right now.  He has hernia containing omentum that would unlikely cause any problem in the near future.  He has diabetes.  He does not have good blood glucose control.  We discussed about the importance of improving blood glucose control before undergoing surgery to decrease risk of infection some related complications.  Discussed with the patient about the need to continue losing weight to improve the risk of recurrence.  We would get a CAT scan abdomen pelvis to assess hernia anatomy and contents.  He will follow-up in my office in 2 months.  Discussed with the patient that if hernia becomes symptomatic then he should call my office and we will likely arrange surgical repair earlier.  He understood    Plan:  1.  Follow-up in my office in 2 months  2.  Continue weight loss  3.  CT scan abdomen pelvis without contrast.    All questions were answered and the patient was willing to proceed with the above recommendations.    Orders:   Orders Placed This Encounter   Procedures    CT Abdomen Pelvis Without Contrast     Standing Status:   Future     Expected Date:   6/18/2025     Expiration Date:   9/17/2026     Will Oral Contrast be needed for this procedure?:   No     Reason for Exam::   umbilical hernia, incarcerated      Release to patient:   Routine Release [[id number]]         Jakob Paul MD  General, Minimally Invasive and Endoscopic Surgery  [facility], operated by [facility]    [address], [address]  [place], [place], [address]  P: [phone]  F: [phone]

## 2025-06-24 ENCOUNTER — HOSPITAL ENCOUNTER (OUTPATIENT)
Dept: CT IMAGING | Facility: HOSPITAL | Age: 55
Discharge: HOME | End: 2025-06-24
Admitting: SURGERY
Payer: MEDICARE

## 2025-06-24 DIAGNOSIS — K42.9 UMBILICAL HERNIA WITHOUT OBSTRUCTION AND WITHOUT GANGRENE: ICD-10-CM

## 2025-06-24 PROCEDURE — 74176 CT ABD & PELVIS W/O CONTRAST: CPT

## 2025-06-26 ENCOUNTER — TELEPHONE (OUTPATIENT)
Dept: FAMILY MEDICINE CLINIC | Facility: CLINIC | Age: 55
End: 2025-06-26
Payer: MEDICARE

## 2025-06-26 DIAGNOSIS — E11.42 TYPE 2 DIABETES MELLITUS WITH DIABETIC POLYNEUROPATHY, WITHOUT LONG-TERM CURRENT USE OF INSULIN: Primary | ICD-10-CM

## 2025-06-26 DIAGNOSIS — L84 PRE-ULCERATIVE CORN OR CALLOUS: ICD-10-CM

## 2025-06-26 NOTE — TELEPHONE ENCOUNTER
Patient needs an updated referral to [facility]. He has an appt tomorrow and will not see him without a new referral.    Please place the referral as outgoing to Dr Shi. Their office is on [facility] and they can see the referral immediately.    Thanks

## 2025-06-30 ENCOUNTER — TELEPHONE (OUTPATIENT)
Dept: FAMILY MEDICINE CLINIC | Facility: CLINIC | Age: 55
End: 2025-06-30
Payer: MEDICARE

## 2025-06-30 DIAGNOSIS — Z12.11 COLON CANCER SCREENING: Primary | ICD-10-CM

## 2025-07-01 ENCOUNTER — TELEPHONE (OUTPATIENT)
Dept: SURGERY | Facility: CLINIC | Age: 55
End: 2025-07-01
Payer: MEDICARE

## 2025-07-01 DIAGNOSIS — E11.42 DIABETIC PERIPHERAL NEUROPATHY: ICD-10-CM

## 2025-07-01 RX ORDER — GABAPENTIN 400 MG/1
400 CAPSULE ORAL 3 TIMES DAILY
Qty: 270 CAPSULE | Refills: 2 | Status: SHIPPED | OUTPATIENT
Start: 2025-07-01

## 2025-07-01 NOTE — TELEPHONE ENCOUNTER
I spoke with the patient regarding his CAT scan of the abdomen and pelvis.  He has a umbilical hernia containing a loop of transverse colon.  There is no signs of incarceration or strangulation.  There is no signs of obstruction.  Discussed with the patient that although he had a hernia for 15 years I am concerned that having a loop of colon will cause problems in the future.  Discussed with him about the need to try to lose weight in the next 2 months.  Will see him in the office in 2 months for follow-up.  Will see him in the office earlier or advising to come to emergency room if symptoms worsen.  He understood

## 2025-07-02 ENCOUNTER — TELEPHONE (OUTPATIENT)
Dept: SURGERY | Facility: CLINIC | Age: 55
End: 2025-07-02
Payer: MEDICARE

## 2025-07-02 NOTE — TELEPHONE ENCOUNTER
Called and spoke with patient and scheduled him for a 6 week follow-up. Patient stated he did not remember what was discussed with  yesterday. Informed patient of the results and recommendations per 's telephone encounter on 7/1/25. Patient verbalized understanding.

## 2025-07-02 NOTE — TELEPHONE ENCOUNTER
----- Message from Jakob Paul sent at 7/1/2025  5:59 PM EDT -----  Please call this patient for an appointment in 6 weeks.  Thank you

## 2025-07-11 DIAGNOSIS — I10 BENIGN ESSENTIAL HYPERTENSION: ICD-10-CM

## 2025-07-11 DIAGNOSIS — M1A.09X0 IDIOPATHIC CHRONIC GOUT OF MULTIPLE SITES WITHOUT TOPHUS: ICD-10-CM

## 2025-07-11 RX ORDER — ALLOPURINOL 100 MG/1
200 TABLET ORAL 2 TIMES DAILY
Qty: 360 TABLET | Refills: 3 | OUTPATIENT
Start: 2025-07-11

## 2025-08-07 ENCOUNTER — OFFICE VISIT (OUTPATIENT)
Dept: FAMILY MEDICINE CLINIC | Facility: CLINIC | Age: 55
End: 2025-08-07
Payer: MEDICARE

## 2025-08-07 VITALS
SYSTOLIC BLOOD PRESSURE: 118 MMHG | DIASTOLIC BLOOD PRESSURE: 68 MMHG | BODY MASS INDEX: 38.36 KG/M2 | HEIGHT: 76 IN | WEIGHT: 315 LBS | HEART RATE: 91 BPM | OXYGEN SATURATION: 96 %

## 2025-08-07 DIAGNOSIS — I10 BENIGN ESSENTIAL HYPERTENSION: ICD-10-CM

## 2025-08-07 DIAGNOSIS — E66.813 CLASS 3 SEVERE OBESITY WITH SERIOUS COMORBIDITY AND BODY MASS INDEX (BMI) OF 40.0 TO 44.9 IN ADULT, UNSPECIFIED OBESITY TYPE: ICD-10-CM

## 2025-08-07 DIAGNOSIS — I89.0 LYMPHEDEMA: ICD-10-CM

## 2025-08-07 DIAGNOSIS — J01.00 ACUTE MAXILLARY SINUSITIS, RECURRENCE NOT SPECIFIED: ICD-10-CM

## 2025-08-07 DIAGNOSIS — N40.0 BENIGN PROSTATIC HYPERPLASIA WITHOUT LOWER URINARY TRACT SYMPTOMS: ICD-10-CM

## 2025-08-07 DIAGNOSIS — M1A.09X0 IDIOPATHIC CHRONIC GOUT OF MULTIPLE SITES WITHOUT TOPHUS: ICD-10-CM

## 2025-08-07 DIAGNOSIS — K42.9 UMBILICAL HERNIA WITHOUT OBSTRUCTION AND WITHOUT GANGRENE: ICD-10-CM

## 2025-08-07 DIAGNOSIS — E29.1 HYPOGONADISM IN MALE: ICD-10-CM

## 2025-08-07 DIAGNOSIS — E78.1 HYPERTRIGLYCERIDEMIA: ICD-10-CM

## 2025-08-07 DIAGNOSIS — E11.42 TYPE 2 DIABETES MELLITUS WITH DIABETIC POLYNEUROPATHY, WITHOUT LONG-TERM CURRENT USE OF INSULIN: Primary | ICD-10-CM

## 2025-08-07 PROBLEM — E66.01 MORBID OBESITY DUE TO EXCESS CALORIES: Status: RESOLVED | Noted: 2018-08-17 | Resolved: 2025-08-07

## 2025-08-07 RX ORDER — CETIRIZINE HYDROCHLORIDE 10 MG/1
10 TABLET ORAL DAILY
Qty: 90 TABLET | Refills: 3 | Status: SHIPPED | OUTPATIENT
Start: 2025-08-07

## 2025-08-07 RX ORDER — NALTREXONE HYDROCHLORIDE 50 MG/1
TABLET, FILM COATED ORAL
Qty: 15 TABLET | Refills: 5 | Status: SHIPPED | OUTPATIENT
Start: 2025-08-07

## 2025-08-08 LAB
ALBUMIN SERPL-MCNC: 4.4 G/DL (ref 3.5–5.2)
ALBUMIN/GLOB SERPL: 1.5 G/DL
ALP SERPL-CCNC: 101 U/L (ref 39–117)
ALT SERPL-CCNC: 20 U/L (ref 1–41)
AST SERPL-CCNC: 19 U/L (ref 1–40)
BASOPHILS # BLD AUTO: 0.03 10*3/MM3 (ref 0–0.2)
BASOPHILS NFR BLD AUTO: 0.5 % (ref 0–1.5)
BILIRUB SERPL-MCNC: 0.6 MG/DL (ref 0–1.2)
BUN SERPL-MCNC: 14 MG/DL (ref 6–20)
BUN/CREAT SERPL: 14.1 (ref 7–25)
CALCIUM SERPL-MCNC: 9.4 MG/DL (ref 8.6–10.5)
CHLORIDE SERPL-SCNC: 96 MMOL/L (ref 98–107)
CHOLEST SERPL-MCNC: 110 MG/DL (ref 0–200)
CO2 SERPL-SCNC: 24 MMOL/L (ref 22–29)
CREAT SERPL-MCNC: 0.99 MG/DL (ref 0.76–1.27)
EGFRCR SERPLBLD CKD-EPI 2021: 90.5 ML/MIN/1.73
EOSINOPHIL # BLD AUTO: 0.12 10*3/MM3 (ref 0–0.4)
EOSINOPHIL NFR BLD AUTO: 2 % (ref 0.3–6.2)
ERYTHROCYTE [DISTWIDTH] IN BLOOD BY AUTOMATED COUNT: 14.1 % (ref 12.3–15.4)
GLOBULIN SER CALC-MCNC: 2.9 GM/DL
GLUCOSE SERPL-MCNC: 146 MG/DL (ref 65–99)
HBA1C MFR BLD: 7.6 % (ref 4.8–5.6)
HCT VFR BLD AUTO: 44.6 % (ref 37.5–51)
HDLC SERPL-MCNC: 34 MG/DL (ref 40–60)
HGB BLD-MCNC: 14.8 G/DL (ref 13–17.7)
IMM GRANULOCYTES # BLD AUTO: 0.02 10*3/MM3 (ref 0–0.05)
IMM GRANULOCYTES NFR BLD AUTO: 0.3 % (ref 0–0.5)
LDLC SERPL CALC-MCNC: 45 MG/DL (ref 0–100)
LYMPHOCYTES # BLD AUTO: 1.61 10*3/MM3 (ref 0.7–3.1)
LYMPHOCYTES NFR BLD AUTO: 26.3 % (ref 19.6–45.3)
MCH RBC QN AUTO: 28.4 PG (ref 26.6–33)
MCHC RBC AUTO-ENTMCNC: 33.2 G/DL (ref 31.5–35.7)
MCV RBC AUTO: 85.6 FL (ref 79–97)
MONOCYTES # BLD AUTO: 0.43 10*3/MM3 (ref 0.1–0.9)
MONOCYTES NFR BLD AUTO: 7 % (ref 5–12)
NEUTROPHILS # BLD AUTO: 3.92 10*3/MM3 (ref 1.7–7)
NEUTROPHILS NFR BLD AUTO: 63.9 % (ref 42.7–76)
NRBC BLD AUTO-RTO: 0 /100 WBC (ref 0–0.2)
PLATELET # BLD AUTO: 169 10*3/MM3 (ref 140–450)
POTASSIUM SERPL-SCNC: 4 MMOL/L (ref 3.5–5.2)
PROT SERPL-MCNC: 7.3 G/DL (ref 6–8.5)
PSA SERPL-MCNC: 0.38 NG/ML (ref 0–4)
RBC # BLD AUTO: 5.21 10*6/MM3 (ref 4.14–5.8)
SODIUM SERPL-SCNC: 137 MMOL/L (ref 136–145)
TESTOST SERPL-MCNC: 616 NG/DL (ref 264–916)
TRIGL SERPL-MCNC: 189 MG/DL (ref 0–150)
URATE SERPL-MCNC: 5.3 MG/DL (ref 3.4–7)
VLDLC SERPL CALC-MCNC: 31 MG/DL (ref 5–40)
WBC # BLD AUTO: 6.13 10*3/MM3 (ref 3.4–10.8)

## 2025-08-12 ENCOUNTER — OFFICE VISIT (OUTPATIENT)
Dept: SURGERY | Facility: CLINIC | Age: 55
End: 2025-08-12
Payer: MEDICARE

## 2025-08-12 VITALS
WEIGHT: 315 LBS | OXYGEN SATURATION: 97 % | HEIGHT: 76 IN | BODY MASS INDEX: 38.36 KG/M2 | SYSTOLIC BLOOD PRESSURE: 122 MMHG | HEART RATE: 107 BPM | DIASTOLIC BLOOD PRESSURE: 68 MMHG

## 2025-08-12 DIAGNOSIS — R19.5 POSITIVE COLORECTAL CANCER SCREENING USING COLOGUARD TEST: Primary | ICD-10-CM

## 2025-08-12 PROCEDURE — 1160F RVW MEDS BY RX/DR IN RCRD: CPT | Performed by: SURGERY

## 2025-08-12 PROCEDURE — 3078F DIAST BP <80 MM HG: CPT | Performed by: SURGERY

## 2025-08-12 PROCEDURE — 3074F SYST BP LT 130 MM HG: CPT | Performed by: SURGERY

## 2025-08-12 PROCEDURE — 99214 OFFICE O/P EST MOD 30 MIN: CPT | Performed by: SURGERY

## 2025-08-12 PROCEDURE — 1159F MED LIST DOCD IN RCRD: CPT | Performed by: SURGERY

## 2025-08-12 RX ORDER — FAMOTIDINE 40 MG/1
40 TABLET, FILM COATED ORAL EVERY EVENING
Qty: 60 TABLET | Refills: 1 | Status: SHIPPED | OUTPATIENT
Start: 2025-08-12 | End: 2026-08-12

## 2025-08-12 RX ORDER — GLIPIZIDE 10 MG/1
10 TABLET, FILM COATED, EXTENDED RELEASE ORAL 2 TIMES DAILY
Qty: 60 TABLET | Refills: 5 | Status: SHIPPED | OUTPATIENT
Start: 2025-08-12

## 2025-08-15 ENCOUNTER — TELEPHONE (OUTPATIENT)
Dept: SURGERY | Facility: CLINIC | Age: 55
End: 2025-08-15
Payer: MEDICARE

## 2025-08-21 ENCOUNTER — EXTERNAL PBMM DATA (OUTPATIENT)
Dept: PHARMACY | Facility: OTHER | Age: 55
End: 2025-08-21
Payer: MEDICARE